# Patient Record
Sex: FEMALE | Race: WHITE | NOT HISPANIC OR LATINO | Employment: OTHER | URBAN - METROPOLITAN AREA
[De-identification: names, ages, dates, MRNs, and addresses within clinical notes are randomized per-mention and may not be internally consistent; named-entity substitution may affect disease eponyms.]

---

## 2017-11-13 ENCOUNTER — ALLSCRIPTS OFFICE VISIT (OUTPATIENT)
Dept: OTHER | Facility: OTHER | Age: 77
End: 2017-11-13

## 2017-11-13 DIAGNOSIS — Z12.31 ENCOUNTER FOR SCREENING MAMMOGRAM FOR MALIGNANT NEOPLASM OF BREAST: ICD-10-CM

## 2017-11-13 DIAGNOSIS — E78.5 HYPERLIPIDEMIA: ICD-10-CM

## 2017-11-13 DIAGNOSIS — M85.80 OTHER SPECIFIED DISORDERS OF BONE DENSITY AND STRUCTURE, UNSPECIFIED SITE (CODE): ICD-10-CM

## 2017-11-13 DIAGNOSIS — I10 ESSENTIAL (PRIMARY) HYPERTENSION: ICD-10-CM

## 2017-11-14 NOTE — PROGRESS NOTES
Assessment    1  Benign essential hypertension (401 1) (I10)   2  Palpitations (785 1) (R00 2)   3  Dyspareunia, female (625 0) (N94 10)   4  Vaginal atrophy (627 3) (N95 2)   5  Hyperlipidemia (272 4) (E78 5)   6  Osteopenia (733 90) (M85 80)   7  Generalized anxiety disorder (300 02) (F41 1)   8  Positive depression screening (796 4) (Z13 89)   9  Allergic rhinitis (477 9) (J30 9)   10  Generalized osteoarthritis (715 00) (M15 9)   11  Visit for screening mammogram (V76 12) (Z12 31)   12  Post-menopausal (V49 81) (Z78 0)   13  History of Vaginal prolapse (618 00) (N81 10)   14  History of Posterior Colporrhaphy (For Pelvic Relaxation)   15  History of Oophorectomy - Bilateral (Removal Of Both Ovaries)   16  History of Complete Colonoscopy   17  Family history of depression (V17 0) (Z81 8) : Mother   25  Family history of prostate carcinoma (V16 42) (O33 59) : Father   23     20  Three children   21  Lives with wife   25  Skin lesion (709 9) (L98 9)    Plan  Benign essential hypertension    · (1) CBC/PLT/DIFF; Status:Active; Requested for:13Nov2017;    · (1) COMPREHENSIVE METABOLIC PANEL; Status:Active; Requested for:13Nov2017;    · (1) MICROALBUMIN CREATININE RATIO, RANDOM URINE; Status:Active; Requestedfor:13Nov2017;    · Follow-up visit in 4 Months Evaluation and Treatment  Follow-up  Status: Hold For -Scheduling  Requested for: 61MDN9864   · A diet low in sodium and high in potassium, magnesium, and calcium can help yourblood pressure ; Status:Complete;   Done: 76DRP9417   · Begin a limited exercise program ; Status:Complete;   Done: 09MCB4885   · Prevnar 13 Intramuscular Suspension; INJECT 0 5ML INTRAMUSCULARLY; To Be Done: 21LYE1655  Hyperlipidemia    · (1) LIPID PANEL FASTING W DIRECT LDL REFLEX; Status:Active; Requestedfor:13Nov2017;    · (1) TSH; Status:Active;  Requested for:13Nov2017;    · Eat a low fat and low cholesterol diet ; Status:Complete;   Done: 72SKG0240  Osteopenia    · (1) VITAMIN D 25-HYDROXY; Status:Active; Requested for:13Nov2017;    · * DXA BONE DENSITY SPINE HIP AND PELVIS; Status:Hold For - Scheduling; Requestedfor:13Nov2017;   Palpitations    · EKG/ECG- POC; Status:Resulted - Requires Verification;   Done: 95ZWT0262 09:47AM  Positive depression screening    · *VB - Depression Follow Up With Primary Care Provider Evaluation and Treatment Follow-up  Status: Hold For - Scheduling  Requested for: 53JIQ5717  Skin lesion    · *1 - [facility]  *  Status: Hold For - Scheduling Requested for: 85YKV1022  Care Summary provided  : Yes  Visit for screening mammogram    · MAMMO SCREENING BILATERAL W 3D & CAD; Status:Hold For - Scheduling; Requestedfor:13Nov2017;     Discussion/Summary  Discussion Summary:   Doing well  Will check labs, order mammo and dexa  Colon cancer screen is up to date  Refusing the flu vaccine, but will get the prevnar  Can make an appt with the PA for pap  Suspect AK of the face and will refer to derm due to location and size  Discussed depression and she feels it is ok and doesn't want meds  Continue off prescription meds and on supplements  RTC four months  EKG w/o change from the past    Medication SE Review and Pt Understands Tx: Possible side effects of new medications were reviewed with the patient/guardian today  The treatment plan was reviewed with the patient/guardian  The patient/guardian understands and agrees with the treatment plan      Chief Complaint  Chief Complaint Free Text Note Form: Pt here to be established with our practice  Pt c/o allergies and sinus problems  Pt has mild depression  Pt defers Flu vaccine  Fall risk done today  History of Present Illness  HPI: DANIELLA, former pt of [email or web address], presents to establish  Doing well and remains active  No c/o's  Just getting over a URI  Depression screen is mildly positive  Due for labs, mammo, dexa, and vaccines  Not on any prescription meds, but on a lot of supplements   Still with [facility]  Preventive Quality Program 65 and Older: Falls Risk: The patient fell 0 times in the past 12 months  The patient is currently asymptomatic Symptoms Include: The patient currently has no urinary incontinence symptoms  Date of last glaucoma screen was 2017      Review of Systems  Complete-Female:  Constitutional: no fever,-- not feeling poorly,-- no chills-- and-- not feeling tired  Eyes: No complaints of eye pain, no red eyes, no eyesight problems, no discharge, no dry eyes, no itching of eyes  ENT: no complaints of earache, no loss of hearing, no nose bleeds, no nasal discharge, no sore throat, no hoarseness  Cardiovascular: palpitations, but-- no chest pain,-- no intermittent leg claudication-- and-- no lower extremity edema  Respiratory: no shortness of breath,-- no cough,-- no orthopnea,-- no wheezing,-- no shortness of breath during exertion-- and-- no PND  Gastrointestinal: no abdominal pain,-- no nausea,-- no constipation-- and-- no diarrhea  Genitourinary: no dysuria  Musculoskeletal: No complaints of arthralgias, no myalgias, no joint swelling or stiffness, no limb pain or swelling  Integumentary: No complaints of skin rash or lesions, no itching, no skin wounds, no breast pain or lump  Neurological: no headache,-- no confusion-- and-- no convulsions  Psychiatric: anxiety, but-- no depression  Endocrine: No complaints of proptosis, no hot flashes, no muscle weakness, no deepening of the voice, no feelings of weakness  Hematologic/Lymphatic: No complaints of swollen glands, no swollen glands in the neck, does not bleed easily, does not bruise easily  Active Problems    1  Allergic rhinitis (477 9) (J30 9)   2  Benign essential hypertension (401 1) (I10)   3  Dyspareunia, female (625 0) (N94 10)   4  Generalized anxiety disorder (300 02) (F41 1)   5  Generalized osteoarthritis (715 00) (M15 9)   6  Hyperlipidemia (272 4) (E78 5)   7  Osteopenia (733 90) (M85 80)   8  Palpitations (785 1) (R00 2)   9  Positive depression screening (796 4) (Z13 89)   10  Post-menopausal (V49 81) (Z78 0)   11  Vaginal atrophy (627 3) (N95 2)   12  Visit for screening mammogram (V76 12) (Z12 31)    Past Medical History  1  History of Vaginal prolapse (618 00) (N81 10)  Active Problems And Past Medical History Reviewed: The active problems and past medical history were reviewed and updated today  Surgical History  1  History of Hysterectomy   2  History of Removal/Revision Of Sling For Stress Incontinence   3  History of Repair Of Bladder Exstrophy  Surgical History Reviewed: The surgical history was reviewed and updated today  Family History  Mother    1  Family history of arthritis (V17 7) (Z82 61)   2  Family history of cardiac disorder (V17 49) (Z82 49)   3  Family history of cerebrovascular accident (CVA) (V17 1) (Z82 3)  Father    4  Family history of diabetes mellitus (V18 0) (Z83 3)  Brother    5  Family history of cardiac disorder (V17 49) (Z82 49)  Family History    6  Family history of diabetes mellitus (V18 0) (Z83 3)  Family History Reviewed: The family history was reviewed and updated today  Social History   · Never a smoker   · No alcohol use  Social History Reviewed: The social history was reviewed and updated today  The social history was reviewed and is unchanged  Current Meds   1  No Reported Medications Recorded    Allergies    1  Aspirin EC TBEC   2  Novocain SOLN    Vitals  Signs   Recorded: 95CHF7444 09:25AM   Temperature: 98 4 F, Tympanic  Heart Rate: 66  Respiration: 16  Systolic: 575, LUE, Sitting  Diastolic: 70, LUE, Sitting  Height: 5 ft 2 in  Weight: 118 lb   BMI Calculated: 21 58  BSA Calculated: 1 53  O2 Saturation: 98    Physical Exam   Constitutional  General appearance: No acute distress, well appearing and well nourished  Eyes  Conjunctiva and lids: No swelling, erythema or discharge     Pupils and irises: Equal, round and reactive to light    Ears, Nose, Mouth, and Throat  Oropharynx: Normal with no erythema, edema, exudate or lesions  Pulmonary  Respiratory effort: No increased work of breathing or signs of respiratory distress  Auscultation of lungs: Clear to auscultation  Cardiovascular  Auscultation of heart: Normal rate and rhythm, normal S1 and S2, without murmurs  Examination of extremities for edema and/or varicosities: Normal    Carotid pulses: Normal    Abdomen  Abdomen: Non-tender, no masses  Liver and spleen: No hepatomegaly or splenomegaly  Lymphatic  Palpation of lymph nodes in neck: No lymphadenopathy  Musculoskeletal  Gait and station: Normal    Digits and nails: Normal without clubbing or cyanosis  Inspection/palpation of joints, bones, and muscles: Normal    Skin  Examination of the skin for lesions: Abnormal  -- Right cheek with a larger macular hyperpigmented lesion  Neurologic  Cranial nerves: Cranial nerves 2-12 intact  Reflexes: 2+ and symmetric  Psychiatric  Orientation to person, place, and time: Normal    Mood and affect: Normal          Results/Data  EKG/ECG- POC 71HUD8494 09:47AM Dionicio Moy     Test Name Result Flag Reference   EKG/ECG      See printout for my interpetation  PHQ-9 Adult Depression Screening 66RYU7084 09:19AM UserAlma Delia     Test Name Result Flag Reference   PHQ-9 Adult Depression Score 7       Over the last two weeks, how often have you been bothered by any of the following problems?  Little interest or pleasure in doing things: Several days - 1 Feeling down, depressed, or hopeless: Several days - 1 Trouble falling or staying asleep, or sleeping too much: Several days - 1 Feeling tired or having little energy: Several days - 1 Poor appetite or over eating: Several days - 1 Feeling bad about yourself - or that you are a failure or have let yourself or your family down: Several days - 1 Trouble concentrating on things, such as reading the newspaper or watching television: Several days - 1 Moving or speaking so slowly that other people could have noticed   Or the opposite -  being so fidgety or restless that you have been moving around a lot more than usual: Not at all - 0 Thoughts that you would be better off dead, or of hurting yourself in some way: Not at all - 0   PHQ-9 Adult Depression Screening Negative     PHQ-9 Difficulty Level Not difficult at all     PHQ-9 Severity Mild Depression           Signatures   Electronically signed by : NATASHA Hunter ; Nov 13 2017 10:42AM EST                       (Author)

## 2017-11-15 ENCOUNTER — APPOINTMENT (OUTPATIENT)
Dept: LAB | Facility: CLINIC | Age: 77
End: 2017-11-15
Payer: MEDICARE

## 2017-11-15 ENCOUNTER — TRANSCRIBE ORDERS (OUTPATIENT)
Dept: LAB | Facility: CLINIC | Age: 77
End: 2017-11-15

## 2017-11-15 DIAGNOSIS — E78.5 HYPERLIPIDEMIA: ICD-10-CM

## 2017-11-15 DIAGNOSIS — I10 ESSENTIAL (PRIMARY) HYPERTENSION: ICD-10-CM

## 2017-11-15 DIAGNOSIS — M85.80 OTHER SPECIFIED DISORDERS OF BONE DENSITY AND STRUCTURE, UNSPECIFIED SITE (CODE): ICD-10-CM

## 2017-11-15 LAB
25(OH)D3 SERPL-MCNC: 47.4 NG/ML (ref 30–100)
ALBUMIN SERPL BCP-MCNC: 3.8 G/DL (ref 3.5–5)
ALP SERPL-CCNC: 73 U/L (ref 46–116)
ALT SERPL W P-5'-P-CCNC: 30 U/L (ref 12–78)
ANION GAP SERPL CALCULATED.3IONS-SCNC: 5 MMOL/L (ref 4–13)
AST SERPL W P-5'-P-CCNC: 20 U/L (ref 5–45)
BASOPHILS # BLD AUTO: 0.03 THOUSANDS/ΜL (ref 0–0.1)
BASOPHILS NFR BLD AUTO: 1 % (ref 0–1)
BILIRUB SERPL-MCNC: 0.55 MG/DL (ref 0.2–1)
BUN SERPL-MCNC: 10 MG/DL (ref 5–25)
CALCIUM SERPL-MCNC: 9 MG/DL (ref 8.3–10.1)
CHLORIDE SERPL-SCNC: 99 MMOL/L (ref 100–108)
CHOLEST SERPL-MCNC: 188 MG/DL (ref 50–200)
CO2 SERPL-SCNC: 32 MMOL/L (ref 21–32)
CREAT SERPL-MCNC: 0.7 MG/DL (ref 0.6–1.3)
CREAT UR-MCNC: 53.2 MG/DL
EOSINOPHIL # BLD AUTO: 0.17 THOUSAND/ΜL (ref 0–0.61)
EOSINOPHIL NFR BLD AUTO: 3 % (ref 0–6)
ERYTHROCYTE [DISTWIDTH] IN BLOOD BY AUTOMATED COUNT: 12.5 % (ref 11.6–15.1)
GFR SERPL CREATININE-BSD FRML MDRD: 85 ML/MIN/1.73SQ M
GLUCOSE P FAST SERPL-MCNC: 90 MG/DL (ref 65–99)
HCT VFR BLD AUTO: 41 % (ref 34.8–46.1)
HDLC SERPL-MCNC: 61 MG/DL (ref 40–60)
HGB BLD-MCNC: 14.4 G/DL (ref 11.5–15.4)
LDLC SERPL CALC-MCNC: 110 MG/DL (ref 0–100)
LYMPHOCYTES # BLD AUTO: 1.14 THOUSANDS/ΜL (ref 0.6–4.47)
LYMPHOCYTES NFR BLD AUTO: 18 % (ref 14–44)
MCH RBC QN AUTO: 30.4 PG (ref 26.8–34.3)
MCHC RBC AUTO-ENTMCNC: 35.1 G/DL (ref 31.4–37.4)
MCV RBC AUTO: 87 FL (ref 82–98)
MICROALBUMIN UR-MCNC: 5.5 MG/L (ref 0–20)
MICROALBUMIN/CREAT 24H UR: 10 MG/G CREATININE (ref 0–30)
MONOCYTES # BLD AUTO: 0.51 THOUSAND/ΜL (ref 0.17–1.22)
MONOCYTES NFR BLD AUTO: 8 % (ref 4–12)
NEUTROPHILS # BLD AUTO: 4.65 THOUSANDS/ΜL (ref 1.85–7.62)
NEUTS SEG NFR BLD AUTO: 70 % (ref 43–75)
NRBC BLD AUTO-RTO: 0 /100 WBCS
PLATELET # BLD AUTO: 246 THOUSANDS/UL (ref 149–390)
PMV BLD AUTO: 9.5 FL (ref 8.9–12.7)
POTASSIUM SERPL-SCNC: 3.9 MMOL/L (ref 3.5–5.3)
PROT SERPL-MCNC: 7.5 G/DL (ref 6.4–8.2)
RBC # BLD AUTO: 4.74 MILLION/UL (ref 3.81–5.12)
SODIUM SERPL-SCNC: 136 MMOL/L (ref 136–145)
TRIGL SERPL-MCNC: 84 MG/DL
TSH SERPL DL<=0.05 MIU/L-ACNC: 0.96 UIU/ML (ref 0.36–3.74)
WBC # BLD AUTO: 6.51 THOUSAND/UL (ref 4.31–10.16)

## 2017-11-15 PROCEDURE — 85025 COMPLETE CBC W/AUTO DIFF WBC: CPT

## 2017-11-15 PROCEDURE — 36415 COLL VENOUS BLD VENIPUNCTURE: CPT

## 2017-11-15 PROCEDURE — 82306 VITAMIN D 25 HYDROXY: CPT

## 2017-11-15 PROCEDURE — 82570 ASSAY OF URINE CREATININE: CPT

## 2017-11-15 PROCEDURE — 82043 UR ALBUMIN QUANTITATIVE: CPT

## 2017-11-15 PROCEDURE — 80061 LIPID PANEL: CPT

## 2017-11-15 PROCEDURE — 84443 ASSAY THYROID STIM HORMONE: CPT

## 2017-11-15 PROCEDURE — 80053 COMPREHEN METABOLIC PANEL: CPT

## 2017-11-16 ENCOUNTER — GENERIC CONVERSION - ENCOUNTER (OUTPATIENT)
Dept: OTHER | Facility: OTHER | Age: 77
End: 2017-11-16

## 2017-12-04 ENCOUNTER — GENERIC CONVERSION - ENCOUNTER (OUTPATIENT)
Dept: FAMILY MEDICINE CLINIC | Facility: CLINIC | Age: 77
End: 2017-12-04

## 2018-01-13 VITALS
HEIGHT: 62 IN | OXYGEN SATURATION: 98 % | RESPIRATION RATE: 16 BRPM | BODY MASS INDEX: 21.71 KG/M2 | WEIGHT: 118 LBS | TEMPERATURE: 98.4 F | DIASTOLIC BLOOD PRESSURE: 70 MMHG | SYSTOLIC BLOOD PRESSURE: 138 MMHG | HEART RATE: 66 BPM

## 2018-01-16 NOTE — RESULT NOTES
Verified Results  (1) VITAMIN D 25-HYDROXY [id number] 08:20AM Aline Burkitt Order Number: [id number]     Test Name Result Flag Reference   VIT D 25-HYDROX 47 4 ng/mL  30 0-100 0   This assay is a certified procedure of the CDC Vitamin D Standardization Certification Program (VDSCP)     Deficiency <20ng/ml   Insufficiency 20-30ng/ml   Sufficient  ng/ml     *Patients undergoing fluorescein dye angiography may retain small amounts of fluorescein in the body for 48-72 hours post procedure  Samples containing fluorescein can produce falsely elevated Vitamin D values  If the patient had this procedure, a specimen should be resubmitted post fluorescein clearance  (1) LIPID PANEL FASTING W DIRECT LDL REFLEX [id number] 08:20AM Aline Burkitt Order Number: [id number]     Test Name Result Flag Reference   CHOLESTEROL 188 mg/dL     LDL CHOLESTEROL CALCULATED 110 mg/dL H 0-100   Triglyceride:        Normal <150 mg/dl   Borderline High 150-199 mg/dl   High 200-499 mg/dl   Very High >499 mg/dl      Cholesterol:       Desirable <200 mg/dl    Borderline High 200-239 mg/dl    High >239 mg/dl      HDL Cholesterol:       High>59 mg/dL    Low <41 mg/dL      HDL Cholesterol:       High>59 mg/dL    Low <41 mg/dL      This screening LDL is a calculated result  It does not have the accuracy of the Direct Measured LDL in the monitoring of patients with hyperlipidemia and/or statin therapy  Direct Measure LDL (TRF396) must be ordered separately in these patients  TRIGLYCERIDES 84 mg/dL  <=150   Specimen collection should occur prior to N-Acetylcysteine or Metamizole administration due to the potential for falsely depressed results  HDL,DIRECT 61 mg/dL H 40-60   Specimen collection should occur prior to Metamizole administration due to the potential for falsley depressed results       (1) TSH [id number] 08:20AM Aline Burkitt Order Number: [id number]     Test Name Result Flag Reference   TSH 0 964 uIU/mL  0 358-3 740   Patients undergoing fluorescein dye angiography may retain small amounts of fluorescein in the body for 48-72 hours post procedure  Samples containing fluorescein can produce falsely depressed TSH values  If the patient had this procedure,a specimen should be resubmitted post fluorescein clearance  The recommended reference ranges for TSH during pregnancy are as follows:  First trimester 0 1 to 2 5 uIU/mL  Second trimester  0 2 to 3 0 uIU/mL  Third trimester 0 3 to 3 0 uIU/m     (1) CBC/PLT/DIFF [id number] 08:20AM Annamarie Hannah Order Number: [id number]     Test Name Result Flag Reference   WBC COUNT 6 51 Thousand/uL  4 31-10 16   RBC COUNT 4 74 Million/uL  3 81-5 12   HEMOGLOBIN 14 4 g/dL  11 5-15 4   HEMATOCRIT 41 0 %  34 8-46  1   MCV 87 fL  82-98   MCH 30 4 pg  26 8-34 3   MCHC 35 1 g/dL  31 4-37 4   RDW 12 5 %  11 6-15 1   MPV 9 5 fL  8 9-12 7   PLATELET COUNT 378 Thousands/uL  149-390   nRBC AUTOMATED 0 /100 WBCs     NEUTROPHILS RELATIVE PERCENT 70 %  43-75   LYMPHOCYTES RELATIVE PERCENT 18 %  14-44   MONOCYTES RELATIVE PERCENT 8 %  4-12   EOSINOPHILS RELATIVE PERCENT 3 %  0-6   BASOPHILS RELATIVE PERCENT 1 %  0-1   NEUTROPHILS ABSOLUTE COUNT 4 65 Thousands/? ??L  1 85-7 62   LYMPHOCYTES ABSOLUTE COUNT 1 14 Thousands/? ??L  0 60-4 47   MONOCYTES ABSOLUTE COUNT 0 51 Thousand/? ??L  0 17-1 22   EOSINOPHILS ABSOLUTE COUNT 0 17 Thousand/? ??L  0 00-0 61   BASOPHILS ABSOLUTE COUNT 0 03 Thousands/? ??L  0 00-0 10     (1) COMPREHENSIVE METABOLIC PANEL [id number] 26:36WC Annamarie Hannah Order Number: [id number]     Test Name Result Flag Reference   SODIUM 136 mmol/L  136-145   POTASSIUM 3 9 mmol/L  3 5-5 3   CHLORIDE 99 mmol/L L 100-108   CARBON DIOXIDE 32 mmol/L  21-32   ANION GAP (CALC) 5 mmol/L  4-13   BLOOD UREA NITROGEN 10 mg/dL  5-25   CREATININE 0 70 mg/dL  0 60-1 30   Standardized to IDMS reference method   CALCIUM 9 0 mg/dL  8 3-10 1 BILI, TOTAL 0 55 mg/dL  0 20-1 00   ALK PHOSPHATAS 73 U/L     ALT (SGPT) 30 U/L  12-78   Specimen collection should occur prior to Sulfasalazine and/or Sulfapyridine administration due to the potential for falsely depressed results  AST(SGOT) 20 U/L  5-45   Specimen collection should occur prior to Sulfasalazine administration due to the potential for falsely depressed results  ALBUMIN 3 8 g/dL  3 5-5 0   TOTAL PROTEIN 7 5 g/dL  6 4-8 2   eGFR 85 ml/min/1 73sq m     National Kidney Disease Education Program recommendations are as follows:  GFR calculation is accurate only with a steady state creatinine  Chronic Kidney disease less than 60 ml/min/1 73 sq  meters  Kidney failure less than 15 ml/min/1 73 sq  meters  GLUCOSE FASTING 90 mg/dL  65-99   Specimen collection should occur prior to Sulfasalazine administration due to the potential for falsely depressed results  Specimen collection should occur prior to Sulfapyridine administration due to the potential for falsely elevated results       (1) MICROALBUMIN CREATININE RATIO, RANDOM URINE [id number] 08:20AM Jennifer Kinsey Order Number: [id number]     Test Name Result Flag Reference   MICROALBUMIN/ CREAT R 10 mg/g creatinine  0-30   MICROALBUMIN,URINE 5 5 mg/L  0 0-20 0   CREATININE URINE 53 2 mg/dL

## 2018-03-11 PROBLEM — M15.9 GENERALIZED OSTEOARTHRITIS: Status: ACTIVE | Noted: 2017-11-13

## 2018-03-11 PROBLEM — N95.2 VAGINAL ATROPHY: Status: ACTIVE | Noted: 2017-11-13

## 2018-03-11 PROBLEM — J30.9 ALLERGIC RHINITIS: Status: ACTIVE | Noted: 2017-11-13

## 2018-03-11 PROBLEM — E78.5 HYPERLIPIDEMIA: Status: ACTIVE | Noted: 2017-11-13

## 2018-03-11 PROBLEM — R00.2 PALPITATIONS: Status: ACTIVE | Noted: 2017-11-13

## 2018-03-11 PROBLEM — M85.80 OSTEOPENIA: Status: ACTIVE | Noted: 2017-11-13

## 2018-03-11 PROBLEM — F41.1 GENERALIZED ANXIETY DISORDER: Status: ACTIVE | Noted: 2017-11-13

## 2018-03-11 PROBLEM — I10 BENIGN ESSENTIAL HYPERTENSION: Status: ACTIVE | Noted: 2017-11-13

## 2018-03-11 PROBLEM — N94.10 DYSPAREUNIA, FEMALE: Status: ACTIVE | Noted: 2017-11-13

## 2018-03-12 ENCOUNTER — OFFICE VISIT (OUTPATIENT)
Dept: INTERNAL MEDICINE CLINIC | Facility: CLINIC | Age: 78
End: 2018-03-12
Payer: MEDICARE

## 2018-03-12 VITALS
BODY MASS INDEX: 21.9 KG/M2 | HEIGHT: 62 IN | DIASTOLIC BLOOD PRESSURE: 80 MMHG | WEIGHT: 119 LBS | SYSTOLIC BLOOD PRESSURE: 140 MMHG | HEART RATE: 68 BPM | OXYGEN SATURATION: 98 % | TEMPERATURE: 97.8 F | RESPIRATION RATE: 18 BRPM

## 2018-03-12 DIAGNOSIS — Z01.818 PREOP EXAM FOR INTERNAL MEDICINE: Primary | ICD-10-CM

## 2018-03-12 DIAGNOSIS — H25.9 AGE-RELATED CATARACT OF BOTH EYES, UNSPECIFIED AGE-RELATED CATARACT TYPE: ICD-10-CM

## 2018-03-12 PROCEDURE — 99214 OFFICE O/P EST MOD 30 MIN: CPT | Performed by: INTERNAL MEDICINE

## 2018-03-12 RX ORDER — PROPRANOLOL/HYDROCHLOROTHIAZID 40 MG-25MG
1 TABLET ORAL DAILY
COMMUNITY
Start: 2017-11-13

## 2018-03-12 RX ORDER — CRANBERRY FRUIT EXTRACT 425 MG
CAPSULE ORAL
COMMUNITY
Start: 2017-11-13

## 2018-03-12 RX ORDER — UBIDECARENONE 75 MG
CAPSULE ORAL
COMMUNITY
Start: 2017-11-13

## 2018-03-12 RX ORDER — DOCUSATE SODIUM 100 MG/1
1 CAPSULE, LIQUID FILLED ORAL
COMMUNITY
Start: 2017-11-13

## 2018-03-12 RX ORDER — CHLORAL HYDRATE 500 MG
2 CAPSULE ORAL 2 TIMES DAILY
COMMUNITY
Start: 2017-11-13

## 2018-03-12 RX ORDER — PERPHENAZINE/AMITRIPTYLINE HCL 4 MG-25 MG
TABLET ORAL
COMMUNITY
Start: 2017-11-13

## 2018-03-12 NOTE — PROGRESS NOTES
Subjective:     Virginia Cavanaugh is a 78 y o  female who presents to the office today for a preoperative consultation at the request of surgeon Dr Maurice Mendez who plans on performing bilat cataracts on March 21 and April 11  This consultation is requested for the specific conditions prompting preoperative evaluation (i e  because of potential affect on operative risk): Htn  Planned anesthesia: local  The patient has the following known anesthesia issues: mutiple sx in the past and only issue with anesthesia has been some n/v    Patients bleeding risk: no recent abnormal bleeding  Patient does not have objections to receiving blood products if needed  The following portions of the patient's history were reviewed and updated as appropriate:   She  has a past medical history of Arthritis; Hypertension; and Vaginal prolapse  She   Patient Active Problem List    Diagnosis Date Noted    Allergic rhinitis 11/13/2017    Benign essential hypertension 11/13/2017    Dyspareunia, female 11/13/2017    Generalized anxiety disorder 11/13/2017    Generalized osteoarthritis 11/13/2017    Hyperlipidemia 11/13/2017    Osteopenia 11/13/2017    Palpitations 11/13/2017    Vaginal atrophy 11/13/2017     She  has a past surgical history that includes Colonoscopy; Hysterectomy; Bilateral oophorectomy; Bony pelvis surgery; Incontinence surgery; and Bladder exstrophy closure  Her family history includes Arthritis in her mother; Depression in her mother; Diabetes in her family and father; Heart disease in her brother and mother; Prostate cancer in her father; Stroke in her mother; Substance Abuse in her mother  She  reports that she has never smoked  She has never used smokeless tobacco  She reports that she does not drink alcohol or use drugs    Current Outpatient Prescriptions   Medication Sig Dispense Refill    Aloe Vera 470 MG CAPS Take 1 capsule by mouth daily      B Complex-C-E-Zn (ZINC-VITES PO) Take 1 tablet by mouth daily      B-COMPLEX-C PO Take 1 capsule by mouth daily      Bioflavonoid Products (C COMPLEX PO) Take 1 tablet by mouth daily      Calcium Carbonate-Vitamin D (CALCIUM-CARB 600 + D) 600-125 MG-UNIT TABS Take 1 tablet by mouth 2 (two) times a day      Coenzyme Q10 (CO Q-10) 200 MG CAPS Take by mouth      Cranberry 425 MG CAPS Take by mouth      docusate sodium (STOOL SOFTENER) 100 mg capsule Take 1 capsule by mouth      Evening Primrose Oil 1000 MG CAPS Take by mouth      Garlic 0549 MG CAPS Take by mouth      Lutein 40 MG CAPS Take by mouth      magnesium oxide (MAG-OX) 400 mg Take 1 tablet by mouth daily      Multiple Vitamin (MULTI-VITAMIN DAILY PO) Take by mouth daily      Omega-3 1000 MG CAPS Take 2 capsules by mouth 2 (two) times a day      Psyllium 400 MG CAPS Take 1 capsule by mouth daily      Turmeric (CURCUMIN 95) 500 MG CAPS Take 1 capsule by mouth daily       No current facility-administered medications for this visit  No current outpatient prescriptions on file prior to visit  No current facility-administered medications on file prior to visit  She is allergic to aspirin and procaine       Review of Systems  ROS was negative and pt denies any recent illness  Denies CP , SOB, but continues with intermittent palptations that are unchanged  No Orthopnea or PND  No Sz or syncope  No changes in bowel or bladder habits  Recent back issues that have resoloved        Objective:     /80 (BP Location: Left arm, Patient Position: Sitting, Cuff Size: Adult)   Pulse 68   Temp 97 8 °F (36 6 °C) (Tympanic)   Resp 18   Ht 5' 2" (1 575 m)   Wt 54 kg (119 lb)   SpO2 98%   BMI 21 77 kg/m²     General Appearance:    Alert, cooperative, no distress, appears stated age   Head:    Normocephalic, without obvious abnormality, atraumatic   Eyes:    PERRL, conjunctiva/corneas clear, EOM's intact, fundi     benign, both eyes   Ears:    Normal TM's and external ear canals, both ears   Nose: Nares normal, septum midline, mucosa normal, no drainage    or sinus tenderness   Throat:   Lips, mucosa, and tongue normal; teeth and gums normal   Neck:   Supple, symmetrical, trachea midline, no adenopathy;     thyroid:  no enlargement/tenderness/nodules; no carotid    bruit or JVD   Back:     Symmetric, no curvature, ROM normal, no CVA tenderness   Lungs:     Clear to auscultation bilaterally, respirations unlabored   Chest Wall:    No tenderness or deformity    Heart:    Regular rate and rhythm, S1 and S2 normal, no murmur, rub   or gallop   Breast Exam:    No tenderness, masses, or nipple abnormality   Abdomen:     Soft, non-tender, bowel sounds active all four quadrants,     no masses, no organomegaly   Genitalia:    Normal female without lesion, discharge or tenderness   Rectal:    Normal tone, no masses or tenderness; guaiac negative stool   Extremities:   Extremities normal, atraumatic, no cyanosis or edema   Pulses:   2+ and symmetric all extremities   Skin:   Skin color, texture, turgor normal, no rashes or lesions   Lymph nodes:   Cervical, supraclavicular, and axillary nodes normal   Neurologic:   CNII-XII intact, normal strength, sensation and reflexes     throughout       Predictors of intubation difficulty:   Morbid obesity? no   Anatomically abnormal facies? no   Prominent incisors? no   Receding mandible? no   Short, thick neck? no   Neck range of motion: normal          Dentition: caps but no dentures  Cardiographics  ECG: EKG from 11/17 w/o acute changes  Echocardiogram: not done    Imaging  Chest x-ray: not done     Lab Review   not applicable     Assessment:     78 y o  female with planned surgery as above  Known risk factors for perioperative complications: None  None    Difficulty with intubation is not anticipated  Cardiac Risk Estimation: Kim's Class 1 with cardiac risk of 1%       Current medications which may produce withdrawal symptoms if withheld perioperatively: None Plan:     1  Preoperative workup as follows none  2  Change in medication regimen before surgery: none, continue medication regimen including morning of surgery, with sip of water and continue current meds, but would hold and ASA, NSAID's, and omega 3 one week prior  Can resume her current meds once eating     3  Prophylaxis for cardiac events with perioperative beta-blockers: not indicated  4  Invasive hemodynamic monitoring perioperatively: not indicated  5  Deep vein thrombosis prophylaxis postoperatively:none  6  Surveillance for postoperative MI with ECG immediately postoperatively and on postoperative days 1 and 2 AND troponin levels 24 hours postoperatively and on day 4 or hospital discharge (whichever comes first): Not required     7  Other measures: No other recommendations at this time

## 2018-04-25 ENCOUNTER — OFFICE VISIT (OUTPATIENT)
Dept: INTERNAL MEDICINE CLINIC | Facility: CLINIC | Age: 78
End: 2018-04-25
Payer: MEDICARE

## 2018-04-25 VITALS
DIASTOLIC BLOOD PRESSURE: 70 MMHG | TEMPERATURE: 99.4 F | HEIGHT: 62 IN | SYSTOLIC BLOOD PRESSURE: 140 MMHG | RESPIRATION RATE: 14 BRPM | HEART RATE: 78 BPM | WEIGHT: 120 LBS | BODY MASS INDEX: 22.08 KG/M2

## 2018-04-25 DIAGNOSIS — F41.1 GENERALIZED ANXIETY DISORDER: ICD-10-CM

## 2018-04-25 DIAGNOSIS — I10 BENIGN ESSENTIAL HYPERTENSION: Primary | ICD-10-CM

## 2018-04-25 DIAGNOSIS — R00.2 PALPITATIONS: ICD-10-CM

## 2018-04-25 DIAGNOSIS — E78.2 MIXED HYPERLIPIDEMIA: ICD-10-CM

## 2018-04-25 DIAGNOSIS — M15.9 GENERALIZED OSTEOARTHRITIS: ICD-10-CM

## 2018-04-25 DIAGNOSIS — M85.80 OSTEOPENIA, UNSPECIFIED LOCATION: ICD-10-CM

## 2018-04-25 PROCEDURE — 99214 OFFICE O/P EST MOD 30 MIN: CPT | Performed by: INTERNAL MEDICINE

## 2018-04-25 RX ORDER — KETOROLAC TROMETHAMINE 5 MG/ML
SOLUTION OPHTHALMIC
COMMUNITY
Start: 2018-03-29 | End: 2018-08-27

## 2018-04-25 RX ORDER — TRIPROLIDINE/PSEUDOEPHEDRINE 2.5MG-60MG
TABLET ORAL
COMMUNITY
Start: 2018-03-30 | End: 2018-08-27

## 2018-04-25 RX ORDER — OFLOXACIN 3 MG/ML
SOLUTION/ DROPS OPHTHALMIC
COMMUNITY
Start: 2018-03-29 | End: 2018-08-27

## 2018-04-25 NOTE — PATIENT INSTRUCTIONS

## 2018-04-25 NOTE — PROGRESS NOTES
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  Discussed treatment for the FARZAD and declines due to past meds not agreeing with her  Also, discussed Holter, but declines  If s/s persists, recommend re-eval and pt to call if she wants to go ahead with w/u  Labs due next visit  Keep f/u with optho  Monitor the URI  Pt was taking DM OTC products and advised against it due to palpitations and htn  Pt also takes a lot of supplements and told her to check them for the FARZAD and palpitations  Continue current treatment otherwise  Declines colon cancer screen  RTC four months for routine  Subjective:      Patient ID: Safia Monae is a 78 y o  female  WF RTC for f/u hyperlipidemia, htn, etc  Doing ok and s/p eye surgery two weeks ago  Activity level increasing and no falls  No c/o's and is getting over a URI  ROS shows pt notes increase stress and anxiety along with some increase palpitations  No associated CP, sweating, radiation, lightheadedness, but ?SOB  Last seconds and similar to past palpitations, but maybe more frequent since the sx  Pt reports being w/u about her sight  Not due for labs  The following portions of the patient's history were reviewed and updated as appropriate:   She  has a past medical history of Arthritis; Hypertension; and Vaginal prolapse    She   Patient Active Problem List    Diagnosis Date Noted    Allergic rhinitis 11/13/2017    Benign essential hypertension 11/13/2017    Dyspareunia, female 11/13/2017    Generalized anxiety disorder 11/13/2017    Generalized osteoarthritis 11/13/2017    Hyperlipidemia 11/13/2017    Osteopenia 11/13/2017    Palpitations 11/13/2017    Vaginal atrophy 11/13/2017     She  has a past surgical history that includes Colonoscopy; Hysterectomy; Bilateral oophorectomy; Bony pelvis surgery; Incontinence surgery; Bladder exstrophy closure; and Eye surgery  Her family history includes Arthritis in her mother; Depression in her mother; Diabetes in her family and father; Heart disease in her brother and mother; Prostate cancer in her father; Stroke in her mother; Substance Abuse in her mother  She  reports that she has never smoked  She has never used smokeless tobacco  She reports that she does not drink alcohol or use drugs  Current Outpatient Prescriptions   Medication Sig Dispense Refill    Bioflavonoid Products (C COMPLEX PO) Take 1 tablet by mouth daily      Calcium Carbonate-Vitamin D (CALCIUM-CARB 600 + D) 600-125 MG-UNIT TABS Take 1 tablet by mouth 2 (two) times a day      Coenzyme Q10 (CO Q-10) 200 MG CAPS Take by mouth      Cranberry 425 MG CAPS Take by mouth      docusate sodium (STOOL SOFTENER) 100 mg capsule Take 1 capsule by mouth      DUREZOL 0 05 % EMUL       Evening Primrose Oil 1000 MG CAPS Take by mouth      Garlic 5289 MG CAPS Take by mouth      ketorolac (ACULAR) 0 5 % ophthalmic solution       Lutein 40 MG CAPS Take by mouth      magnesium oxide (MAG-OX) 400 mg Take 1 tablet by mouth daily      Multiple Vitamin (MULTI-VITAMIN DAILY PO) Take by mouth daily      ofloxacin (OCUFLOX) 0 3 % ophthalmic solution       Omega-3 1000 MG CAPS Take 2 capsules by mouth 2 (two) times a day      Psyllium 400 MG CAPS Take 1 capsule by mouth daily      Turmeric (CURCUMIN 95) 500 MG CAPS Take 1 capsule by mouth daily       No current facility-administered medications for this visit        Current Outpatient Prescriptions on File Prior to Visit Medication Sig    Bioflavonoid Products (C COMPLEX PO) Take 1 tablet by mouth daily    Calcium Carbonate-Vitamin D (CALCIUM-CARB 600 + D) 600-125 MG-UNIT TABS Take 1 tablet by mouth 2 (two) times a day    Coenzyme Q10 (CO Q-10) 200 MG CAPS Take by mouth    Cranberry 425 MG CAPS Take by mouth    docusate sodium (STOOL SOFTENER) 100 mg capsule Take 1 capsule by mouth    Evening Primrose Oil 1000 MG CAPS Take by mouth    Garlic 2028 MG CAPS Take by mouth    Lutein 40 MG CAPS Take by mouth    magnesium oxide (MAG-OX) 400 mg Take 1 tablet by mouth daily    Multiple Vitamin (MULTI-VITAMIN DAILY PO) Take by mouth daily    Omega-3 1000 MG CAPS Take 2 capsules by mouth 2 (two) times a day    Psyllium 400 MG CAPS Take 1 capsule by mouth daily    Turmeric (CURCUMIN 95) 500 MG CAPS Take 1 capsule by mouth daily    [DISCONTINUED] Aloe Vera 470 MG CAPS Take 1 capsule by mouth daily    [DISCONTINUED] B Complex-C-E-Zn (ZINC-VITES PO) Take 1 tablet by mouth daily    [DISCONTINUED] B-COMPLEX-C PO Take 1 capsule by mouth daily     No current facility-administered medications on file prior to visit  She is allergic to aspirin and procaine       Review of Systems   Constitutional: Positive for activity change  Negative for chills, diaphoresis, fatigue and fever  HENT: Positive for congestion, postnasal drip and rhinorrhea  Eyes: Positive for visual disturbance  Respiratory: Negative for cough, chest tightness, shortness of breath and wheezing  Cardiovascular: Positive for palpitations  Negative for chest pain and leg swelling  Gastrointestinal: Negative for abdominal pain, constipation, diarrhea, nausea and vomiting  Endocrine: Negative for cold intolerance and heat intolerance  Genitourinary: Negative for difficulty urinating, dysuria and frequency  Musculoskeletal: Negative for arthralgias, gait problem and myalgias     Neurological: Negative for dizziness, tremors, seizures, syncope, facial asymmetry, weakness, light-headedness, numbness and headaches  Psychiatric/Behavioral: Negative for confusion, dysphoric mood and sleep disturbance  The patient is nervous/anxious  Objective:      /70   Pulse 78   Temp 99 4 °F (37 4 °C) (Tympanic)   Resp 14   Ht 5' 2" (1 575 m)   Wt 54 4 kg (120 lb)   BMI 21 95 kg/m²          Physical Exam   Constitutional: She is oriented to person, place, and time  She appears well-developed and well-nourished  No distress  HENT:   Head: Normocephalic and atraumatic  Mouth/Throat: Oropharynx is clear and moist  No oropharyngeal exudate  Eyes: Conjunctivae and EOM are normal  Pupils are equal, round, and reactive to light  Neck: Normal range of motion  Neck supple  No JVD present  No thyromegaly present  Cardiovascular: Normal rate, regular rhythm and normal heart sounds  No murmur heard  Pulmonary/Chest: Effort normal and breath sounds normal  No respiratory distress  She has no wheezes  Abdominal: Soft  Bowel sounds are normal  She exhibits no distension  There is no tenderness  Neurological: She is alert and oriented to person, place, and time  Psychiatric: She has a normal mood and affect  Her behavior is normal  Judgment and thought content normal    Very anxious   Nursing note and vitals reviewed

## 2018-08-27 ENCOUNTER — OFFICE VISIT (OUTPATIENT)
Dept: INTERNAL MEDICINE CLINIC | Facility: CLINIC | Age: 78
End: 2018-08-27
Payer: MEDICARE

## 2018-08-27 VITALS
OXYGEN SATURATION: 98 % | DIASTOLIC BLOOD PRESSURE: 74 MMHG | BODY MASS INDEX: 20.98 KG/M2 | HEART RATE: 72 BPM | WEIGHT: 114 LBS | SYSTOLIC BLOOD PRESSURE: 152 MMHG | TEMPERATURE: 97.6 F | RESPIRATION RATE: 18 BRPM | HEIGHT: 62 IN

## 2018-08-27 DIAGNOSIS — Z00.00 MEDICARE ANNUAL WELLNESS VISIT, INITIAL: ICD-10-CM

## 2018-08-27 DIAGNOSIS — Z13.31 POSITIVE DEPRESSION SCREENING: ICD-10-CM

## 2018-08-27 DIAGNOSIS — M15.9 GENERALIZED OSTEOARTHRITIS: ICD-10-CM

## 2018-08-27 DIAGNOSIS — I10 BENIGN ESSENTIAL HYPERTENSION: Primary | ICD-10-CM

## 2018-08-27 DIAGNOSIS — M85.80 OSTEOPENIA, UNSPECIFIED LOCATION: ICD-10-CM

## 2018-08-27 DIAGNOSIS — R00.2 PALPITATIONS: ICD-10-CM

## 2018-08-27 DIAGNOSIS — E78.2 MIXED HYPERLIPIDEMIA: ICD-10-CM

## 2018-08-27 DIAGNOSIS — F41.1 GENERALIZED ANXIETY DISORDER: ICD-10-CM

## 2018-08-27 PROCEDURE — G0438 PPPS, INITIAL VISIT: HCPCS | Performed by: INTERNAL MEDICINE

## 2018-08-27 PROCEDURE — 99214 OFFICE O/P EST MOD 30 MIN: CPT | Performed by: INTERNAL MEDICINE

## 2018-08-27 RX ORDER — METOPROLOL SUCCINATE 50 MG/1
25 TABLET, EXTENDED RELEASE ORAL DAILY
Qty: 90 TABLET | Refills: 0 | Status: SHIPPED | OUTPATIENT
Start: 2018-08-27 | End: 2019-02-03 | Stop reason: SDUPTHER

## 2018-08-27 RX ORDER — METOPROLOL SUCCINATE 50 MG/1
25 TABLET, EXTENDED RELEASE ORAL DAILY
Qty: 90 TABLET | Refills: 0 | Status: SHIPPED | OUTPATIENT
Start: 2018-08-27 | End: 2018-08-27 | Stop reason: SDUPTHER

## 2018-08-27 NOTE — PROGRESS NOTES
Assessment and Plan:    Problem List Items Addressed This Visit        Cardiovascular and Mediastinum    Benign essential hypertension - Primary    Relevant Medications    metoprolol succinate (TOPROL-XL) 50 mg 24 hr tablet    Other Relevant Orders    Comprehensive metabolic panel       Musculoskeletal and Integument    Generalized osteoarthritis    Osteopenia       Other    Generalized anxiety disorder    Relevant Medications    metoprolol succinate (TOPROL-XL) 50 mg 24 hr tablet    Hyperlipidemia    Relevant Orders    LDL cholesterol, direct    Triglycerides    Palpitations    Relevant Medications    metoprolol succinate (TOPROL-XL) 50 mg 24 hr tablet      Other Visit Diagnoses     Medicare annual wellness visit, initial        Positive depression screening        Relevant Medications    metoprolol succinate (TOPROL-XL) 50 mg 24 hr tablet        Health Maintenance Due   Topic Date Due    CRC Screening: Colonoscopy  1940    DTaP,Tdap,and Td Vaccines (1 - Tdap) 07/12/1963    Pneumococcal PPSV23/PCV13 65+ Years / Low and Medium Risk (2 of 2 - PPSV23) 11/13/2018         HPI:  Judith Vela is a 70 y o  female here for her Subsequent Wellness Visit  Please note this is the pt's initial Medicare wellness and I am unable to remove this heading       Patient Active Problem List   Diagnosis    Allergic rhinitis    Benign essential hypertension    Dyspareunia, female    Generalized anxiety disorder    Generalized osteoarthritis    Hyperlipidemia    Osteopenia    Palpitations    Vaginal atrophy     Past Medical History:   Diagnosis Date    Arthritis     Hypertension     Vaginal prolapse     last assessed: 11/13/2017     Past Surgical History:   Procedure Laterality Date    BILATERAL OOPHORECTOMY      last assessed: 11/13/2017    BLADDER EXSTROPHY CLOSURE      Repair of      BONY PELVIS SURGERY      Posterior Colporrhaphy (for pelvic Relaxation) last assessed: 11/13/2017    COLONOSCOPY      last assessed: 11/13/2017    EYE SURGERY      HYSTERECTOMY      INCONTINENCE SURGERY      Removal/Revision of sling for stress incontinence  Family History   Problem Relation Age of Onset    Arthritis Mother     Heart disease Mother     Stroke Mother     Depression Mother     Substance Abuse Mother     Diabetes Father     Prostate cancer Father     Heart disease Brother     Diabetes Family      History   Smoking Status    Never Smoker   Smokeless Tobacco    Never Used     History   Alcohol Use No      History   Drug Use No       Current Outpatient Prescriptions   Medication Sig Dispense Refill    Artificial Tear Ointment (DRY EYES OP) Apply to eye      Bioflavonoid Products (C COMPLEX PO) Take 1 tablet by mouth daily      Calcium Carbonate-Vitamin D (CALCIUM-CARB 600 + D) 600-125 MG-UNIT TABS Take 1 tablet by mouth 2 (two) times a day      Coenzyme Q10 (CO Q-10) 200 MG CAPS Take by mouth      Cranberry 425 MG CAPS Take by mouth      docusate sodium (STOOL SOFTENER) 100 mg capsule Take 1 capsule by mouth      Evening Primrose Oil 1000 MG CAPS Take by mouth      Garlic 2585 MG CAPS Take by mouth      Lutein 40 MG CAPS Take by mouth      magnesium oxide (MAG-OX) 400 mg Take 1 tablet by mouth daily      Multiple Vitamin (MULTI-VITAMIN DAILY PO) Take by mouth daily      Omega-3 1000 MG CAPS Take 2 capsules by mouth 2 (two) times a day      Psyllium 400 MG CAPS Take 1 capsule by mouth daily      Turmeric (CURCUMIN 95) 500 MG CAPS Take 1 capsule by mouth daily      metoprolol succinate (TOPROL-XL) 50 mg 24 hr tablet Take 0 5 tablets (25 mg total) by mouth daily 90 tablet 0     No current facility-administered medications for this visit        Allergies   Allergen Reactions    Aspirin     Procaine      Immunization History   Administered Date(s) Administered    Pneumococcal Conjugate 13-Valent 11/13/2017       Patient Care Team:  Mustapha Bolton DO as PCP - General    Medicare Screening Tests and Risk Assessments:      Health Risk Assessment:  Patient rates overall health as very good  Patient feels that their physical health rating is Same  Eyesight was rated as Slightly worse  Hearing was rated as Same  Patient feels that their emotional and mental health rating is Same  Pain experienced by patient in the last 7 days has been None  Patient's pain rating has been 1/10  Patient states that she has experienced no weight loss or gain in last 6 months  Emotional/Mental Health:  Patient has been feeling nervous/anxious  PHQ-9 Depression Screening:    Frequency of the following problems over the past two weeks:      1  Little interest or pleasure in doing things: 1 - several days      2  Feeling down, depressed, or hopeless: 3 - nearly every day      3  Trouble falling or staying asleep, or sleeping too much: 0 - not at all      4  Feeling tired or having little energy: 1 - several days      5  Poor appetite or overeatin - several days      6  Feeling bad about yourself - or that you are a failure or have let yourself or your family down: 1 - several days      7  Trouble concentrating on things, such as reading the newspaper or watching television: 1 - several days      8  Moving or speaking so slowly that other people could have noticed  Or the opposite - being so fidgety or restless that you have been moving around a lot more than usual: 0 - not at all      9  Thoughts that you would be better off dead, or of hurting yourself in some way: 1 - several days  PHQ-2 Score: 4  PHQ-9 Score: 11    Broken Bones/Falls: Fall Risk Assessment:    In the past year, patient has experienced: No history of falling in past year          Bladder/Bowel:  Patient has not leaked urine accidently in the last six months  Patient reports no loss of bowel control  Immunizations:  Patient has not had a flu vaccination within the last year  Patient has received a pneumonia shot    Patient has not received a shingles shot   Patient has not received tetanus/diphtheria shot  Home Safety:  Patient does not have trouble with stairs inside or outside of their home  Patient currently reports that there are no safety hazards present in home, working smoke alarms, working carbon monoxide detectors  Preventative Screenings:   Breast cancer screening performed, colon cancer screen completed, cholesterol screen completed, glaucoma eye exam completed,     Nutrition:  Current diet: Low Cholesterol, Regular and Limited junk food with servings of the following:    Medications:  Patient is currently taking over-the-counter supplements  List of OTC medications includes: in medication lsit  Patient is able to manage medications  Lifestyle Choices:  Patient reports no tobacco use  Patient has not smoked or used tobacco in the past   Patient reports no alcohol use  Patient drives a vehicle  Patient wears seat belt  Activities of Daily Living:  Can get out of bed by his or her self, able to dress self, able to make own meals, able to do own shopping, able to bathe self, can do own laundry/housekeeping, can manage own money, pay bills and track expenses    Previous Hospitalizations:  No hospitalization or ED visit in past 12 months        Advanced Directives:  Patient has decided on a power of   Patient has spoken to designated power of   Patient has completed advanced directive          Preventative Screening/Counseling:      Cardiovascular:      General: Screening Current      Counseling: Healthy Diet, Healthy Weight, Improve Cholesterol and Improve Blood Pressure     Due for Labs/Analytes/Optional EKG: Lipid Panel          Diabetes:      General: Screening Current      Counseling: Healthy Diet, Healthy Weight and Improve Physical Activity      Due for labs: Blood Glucose          Colorectal Cancer:      General: Risks and Benefits Discussed and Patient Declines      Counseling: high fiber diet Breast Cancer:      General: Screening Current          Cervical Cancer:      General: Screening Not Indicated          Osteoporosis:      General: Screening Current      Counseling: Calcium and Vitamin D Intake and Regular Weightbearing Exercise          AAA:      General: Screening Not Indicated          Glaucoma:      General: Screening Current          HIV:      General: Screening Not Indicated          Hepatitis C:      Additional Comments: Need to order next visit  Advanced Directives:   Patient has living will for healthcare, has durable POA for healthcare, patient has an advanced directive  Information on ACP and/or AD provided  No 5 wishes given  No end of life assessment reviewed with patient  Provider does not agree with end of life deisions  Additional Comments: Need to get copy for the chart and will review then  Immunizations:      Influenza: Influenza Recommended Annually  Additional Comments: Due for second pneumonia vaccine in the winter  Other Preventative Counseling (Non-Medicare): Fall Prevention, Nutrition Counseling and Car/seat belt/driving safety reviewed      A/P: Doing well and no falls  Some depression and FARZAD due to loss of pets  Feels safe at home  DIverse diet  No problems operating a MV and uses seatbelts  Doesn't drive at night  Has a living will, but need copy for the chart  No DME or referrals needed today  RTC one year for medicare wellness

## 2018-08-27 NOTE — PATIENT INSTRUCTIONS
Chronic Hypertension   AMBULATORY CARE:   Hypertension  is high blood pressure (BP)  Your BP is the force of your blood moving against the walls of your arteries  Normal BP is less than 120/80  Prehypertension is between 120/80 and 139/89  Hypertension is 140/90 or higher  Hypertension causes your BP to get so high that your heart has to work much harder than normal  This can damage your heart  Chronic hypertension is a long-term condition that you can control with a healthy lifestyle or medicines  A controlled blood pressure helps protect your organs, such as your heart, lungs, brain, and kidneys  Common symptoms include the following:   · Headache     · Blurred vision    · Chest pain     · Dizziness or weakness     · Trouble breathing     · Nosebleeds  Call 911 for any of the following:   · You have discomfort in your chest that feels like squeezing, pressure, fullness, or pain  · You become confused or have difficulty speaking  · You suddenly feel lightheaded or have trouble breathing  · You have pain or discomfort in your back, neck, jaw, stomach, or arm  Seek care immediately if:   · You have a severe headache or vision loss  · You have weakness in an arm or leg  Contact your healthcare provider if:   · You feel faint, dizzy, confused, or drowsy  · You have been taking your BP medicine and your BP is still higher than your healthcare provider says it should be  · You have questions or concerns about your condition or care  Treatment for chronic hypertension  may include medicine to lower your BP and lower your cholesterol level  A low cholesterol level helps prevent heart disease and makes it easier to control your blood pressure  Heart disease can make your blood pressure harder to control  You may also need to make lifestyle changes  Take your medicine exactly as directed    Manage chronic hypertension:  Talk with your healthcare provider about these and other ways to manage hypertension:  · Take your BP at home  Sit and rest for 5 minutes before you take your BP  Extend your arm and support it on a flat surface  Your arm should be at the same level as your heart  Follow the directions that came with your BP monitor  If possible, take at least 2 BP readings each time  Take your BP at least twice a day at the same times each day, such as morning and evening  Keep a record of your BP readings and bring it to your follow-up visits  Ask your healthcare provider what your blood pressure should be  · Limit sodium (salt) as directed  Too much sodium can affect your fluid balance  Check labels to find low-sodium or no-salt-added foods  Some low-sodium foods use potassium salts for flavor  Too much potassium can also cause health problems  Your healthcare provider will tell you how much sodium and potassium are safe for you to have in a day  He or she may recommend that you limit sodium to 2,300 mg a day  · Follow the meal plan recommended by your healthcare provider  A dietitian or your provider can give you more information on low-sodium plans or the DASH (Dietary Approaches to Stop Hypertension) eating plan  The DASH plan is low in sodium, unhealthy fats, and total fat  It is high in potassium, calcium, and fiber  · Exercise to maintain a healthy weight  Exercise at least 30 minutes per day, on most days of the week  This will help decrease your blood pressure  Ask about the best exercise plan for you  · Decrease stress  This may help lower your BP  Learn ways to relax, such as deep breathing or listening to music  · Limit alcohol  Women should limit alcohol to 1 drink a day  Men should limit alcohol to 2 drinks a day  A drink of alcohol is 12 ounces of beer, 5 ounces of wine, or 1½ ounces of liquor  · Do not smoke  Nicotine and other chemicals in cigarettes and cigars can increase your BP and also cause lung damage   Ask your healthcare provider for information if you currently smoke and need help to quit  E-cigarettes or smokeless tobacco still contain nicotine  Talk to your healthcare provider before you use these products  Follow up with your healthcare provider as directed: You will need to return to have your BP checked and to have other lab tests done  Write down your questions so you remember to ask them during your visits  © 2017 [address] Information is for End User's use only and may not be sold, redistributed or otherwise used for commercial purposes  All illustrations and images included in CareNotes® are the copyrighted property of A [employer] M [employer] Inc  or [employer] Sanz  The above information is an  only  It is not intended as medical advice for individual conditions or treatments  Talk to your doctor, nurse or pharmacist before following any medical regimen to see if it is safe and effective for you  Obesity   AMBULATORY CARE:   Obesity  is when your body mass index (BMI) is greater than 30  Your healthcare provider will use your height and weight to measure your BMI  The risks of obesity include  many health problems, such as injuries or physical disability  You may need tests to check for the following:  · Diabetes     · High blood pressure or high cholesterol     · Heart disease     · Gallbladder or liver disease     · Cancer of the colon, breast, prostate, liver, or kidney     · Sleep apnea     · Arthritis or gout  Seek care immediately if:   · You have a severe headache, confusion, or difficulty speaking  · You have weakness on one side of your body  · You have chest pain, sweating, or shortness of breath  Contact your healthcare provider if:   · You have symptoms of gallbladder or liver disease, such as pain in your upper abdomen  · You have knee or hip pain and discomfort while walking       · You have symptoms of diabetes, such as intense hunger and thirst, and frequent urination  · You have symptoms of sleep apnea, such as snoring or daytime sleepiness  · You have questions or concerns about your condition or care  Treatment for obesity  focuses on helping you lose weight to improve your health  Even a small decrease in BMI can reduce the risk for many health problems  Your healthcare provider will help you set a weight-loss goal   · Lifestyle changes  are the first step in treating obesity  These include making healthy food choices and getting regular physical activity  Your healthcare provider may suggest a weight-loss program that involves coaching, education, and therapy  · Medicine  may help you lose weight when it is used with a healthy diet and physical activity  · Surgery  can help you lose weight if you are very obese and have other health problems  There are several types of weight-loss surgery  Ask your healthcare provider for more information  Be successful losing weight:   · Set small, realistic goals  An example of a small goal is to walk for 20 minutes 5 days a week  Anther goal is to lose 5% of your body weight  · Tell friends, family members, and coworkers about your goals  and ask for their support  Ask a friend to lose weight with you, or join a weight-loss support group  · Identify foods or triggers that may cause you to overeat , and find ways to avoid them  Remove tempting high-calorie foods from your home and workplace  Place a bowl of fresh fruit on your kitchen counter  If stress causes you to eat, then find other ways to cope with stress  · Keep a diary to track what you eat and drink  Also write down how many minutes of physical activity you do each day  Weigh yourself once a week and record it in your diary  Eating changes: You will need to eat 500 to 1,000 fewer calories each day than you currently eat to lose 1 to 2 pounds a week  The following changes will help you cut calories:  · Eat smaller portions    Use small plates, no larger than 9 inches in diameter  Fill your plate half full of fruits and vegetables  Measure your food using measuring cups until you know what a serving size looks like  · Eat 3 meals and 1 or 2 snacks each day  Plan your meals in advance  Alejandro Kenneth and eat at home most of the time  Eat slowly  · Eat fruits and vegetables at every meal   They are low in calories and high in fiber, which makes you feel full  Do not add butter, margarine, or cream sauce to vegetables  Use herbs to season steamed vegetables  · Eat less fat and fewer fried foods  Eat more baked or grilled chicken and fish  These protein sources are lower in calories and fat than red meat  Limit fast food  Dress your salads with olive oil and vinegar instead of bottled dressing  · Limit the amount of sugar you eat  Do not drink sugary beverages  Limit alcohol  Activity changes:  Physical activity is good for your body in many ways  It helps you burn calories and build strong muscles  It decreases stress and depression, and improves your mood  It can also help you sleep better  Talk to your healthcare provider before you begin an exercise program   · Exercise for at least 30 minutes 5 days a week  Start slowly  Set aside time each day for physical activity that you enjoy and that is convenient for you  It is best to do both weight training and an activity that increases your heart rate, such as walking, bicycling, or swimming  · Find ways to be more active  Do yard work and housecleaning  Walk up the stairs instead of using elevators  Spend your leisure time going to events that require walking, such as outdoor festivals or fairs  This extra physical activity can help you lose weight and keep it off  Follow up with your healthcare provider as directed: You may need to meet with a dietitian  Write down your questions so you remember to ask them during your visits     © 2017 [address] Information is for End User's use only and may not be sold, redistributed or otherwise used for commercial purposes  All illustrations and images included in CareNotes® are the copyrighted property of A D A M , Inc  or [employer] Sanz  The above information is an  only  It is not intended as medical advice for individual conditions or treatments  Talk to your doctor, nurse or pharmacist before following any medical regimen to see if it is safe and effective for you  Urinary Incontinence   WHAT YOU NEED TO KNOW:   What is urinary incontinence? Urinary incontinence (UI) is when you lose control of your bladder  What causes UI? UI occurs because your bladder cannot store or empty urine properly  The following are the most common types of UI:  · Stress incontinence  is when you leak urine due to increased bladder pressure  This may happen when you cough, sneeze, or exercise  · Urge incontinence  is when you feel the need to urinate right away and leak urine accidentally  · Mixed incontinence  is when you have both stress and urge UI  What are the signs and symptoms of UI?   · You feel like your bladder does not empty completely when you urinate  · You urinate often and need to urinate immediately  · You leak urine when you sleep, or you wake up with the urge to urinate  · You leak urine when you cough, sneeze, exercise, or laugh  How is UI diagnosed? Your healthcare provider will ask how often you leak urine and whether you have stress or urge symptoms  Tell him which medicines you take, how often you urinate, and how much liquid you drink each day  You may need any of the following tests:  · Urine tests  may show infection or kidney function  · A pelvic exam  may be done to check for blockages  A pelvic exam will also show if your bladder, uterus, or other organs have moved out of place  · An x-ray, ultrasound, or CT  may show problems with parts of your urinary system   You may be given contrast liquid to help your organs show up better in the pictures  Tell the healthcare provider if you have ever had an allergic reaction to contrast liquid  Do not enter the MRI room with anything metal  Metal can cause serious injury  Tell the healthcare provider if you have any metal in or on your body  · A bladder scan  will show how much urine is left in your bladder after you urinate  You will be asked to urinate and then healthcare providers will use a small ultrasound machine to check the urine left in your bladder  · Cystometry  is used to check the function of your urinary system  Your healthcare provider checks the pressure in your bladder while filling it with fluid  Your bladder pressure may also be tested when your bladder is full and while you urinate  How is UI treated? · Medicines  can help strengthen your bladder control  · Electrical stimulation  is used to send a small amount of electrical energy to your pelvic floor muscles  This helps control your bladder function  Electrodes may be placed outside your body or in your rectum  For women, the electrodes may be placed in the vagina  · A bulking agent  may be injected into the wall of your urethra to make it thicker  This helps keep your urethra closed and decreases urine leakage  · Devices  such as a clamp, pessary, or tampon may help stop urine leaks  Ask your healthcare provider for more information about these and other devices  · Surgery  may be needed if other treatments do not work  Several types of surgery can help improve your bladder control  Ask your healthcare provider for more information about the surgery you may need  How can I manage my symptoms? · Do pelvic muscle exercises often  Your pelvic muscles help you stop urinating  Squeeze these muscles tight for 5 seconds, then relax for 5 seconds  Gradually work up to squeezing for 10 seconds  Do 3 sets of 15 repetitions a day, or as directed   This will help strengthen your pelvic muscles and improve bladder control  · A catheter  may be used to help empty your bladder  A catheter is a tiny, plastic tube that is put into your bladder to drain your urine  Your healthcare provider may tell you to use a catheter to prevent your bladder from getting too full and leaking urine  · Keep a UI record  Write down how often you leak urine and how much you leak  Make a note of what you were doing when you leaked urine  · Train your bladder  Go to the bathroom at set times, such as every 2 hours, even if you do not feel the urge to go  You can also try to hold your urine when you feel the urge to go  For example, hold your urine for 5 minutes when you feel the urge to go  As that becomes easier, hold your urine for 10 minutes  · Drink liquids as directed  Ask your healthcare provider how much liquid to drink each day and which liquids are best for you  You may need to limit the amount of liquid you drink to help control your urine leakage  Limit or do not have drinks that contain caffeine or alcohol  Do not drink any liquid right before you go to bed  · Prevent constipation  Eat a variety of high-fiber foods  Good examples are high-fiber cereals, beans, vegetables, and whole-grain breads  Prune juice may help make your bowel movement softer  Walking is the best way to trigger your intestines to have a bowel movement  · Exercise regularly and maintain a healthy weight  Ask your healthcare provider how much you should weigh and about the best exercise plan for you  Weight loss and exercise will decrease pressure on your bladder and help you control your leakage  Ask him to help you create a weight loss plan if you are overweight  When should I seek immediate care? · You have severe pain  · You are confused or cannot think clearly  When should I contact my healthcare provider? · You have a fever  · You see blood in your urine      · You have pain when you urinate  · You have new or worse pain, even after treatment  · Your mouth feels dry or you have vision changes  · Your urine is cloudy or smells bad  · You have questions or concerns about your condition or care  CARE AGREEMENT:   You have the right to help plan your care  Learn about your health condition and how it may be treated  Discuss treatment options with your caregivers to decide what care you want to receive  You always have the right to refuse treatment  The above information is an  only  It is not intended as medical advice for individual conditions or treatments  Talk to your doctor, nurse or pharmacist before following any medical regimen to see if it is safe and effective for you  © 2017 [address] Information is for End User's use only and may not be sold, redistributed or otherwise used for commercial purposes  All illustrations and images included in CareNotes® are the copyrighted property of [employer]  or [employer]  Cigarette Smoking and Your Health   AMBULATORY CARE:   Risks to your health if you smoke:  Nicotine and other chemicals found in tobacco damage every cell in your body  Even if you are a light smoker, you have an increased risk for cancer, heart disease, and lung disease  If you are pregnant or have diabetes, smoking increases your risk for complications  Benefits to your health if you stop smoking:   · You decrease respiratory symptoms such as coughing, wheezing, and shortness of breath  · You reduce your risk for cancers of the lung, mouth, throat, kidney, bladder, pancreas, stomach, and cervix  If you already have cancer, you increase the benefits of chemotherapy  You also reduce your risk for cancer returning or a second cancer from developing  · You reduce your risk for heart disease, blood clots, heart attack, and stroke       · You reduce your risk for lung infections, and diseases such as pneumonia, asthma, chronic bronchitis, and emphysema  · Your circulation improves  More oxygen can be delivered to your body  If you have diabetes, you lower your risk for complications, such as kidney, artery, and eye diseases  You also lower your risk for nerve damage  Nerve damage can lead to amputations, poor vision, and blindness  · You improve your body's ability to heal and to fight infections  Benefits to the health of others if you stop smoking:  Tobacco is harmful to nonsmokers who breathe in your secondhand smoke  The following are ways the health of others around you may improve when you stop smoking:  · You lower the risks for lung cancer and heart disease in nonsmoking adults  · If you are pregnant, you lower the risk for miscarriage, early delivery, low birth weight, and stillbirth  You also lower your baby's risk for SIDS, obesity, developmental delay, and neurobehavioral problems, such as ADHD  · If you have children, you lower their risk for ear infections, colds, pneumonia, bronchitis, and asthma  For more information and support to stop smoking:   · [employer]  Phone: 8- 737 - [phone]  Web Address: www [employer]  Follow up with your healthcare provider as directed:  Write down your questions so you remember to ask them during your visits  © 2017 [address]  Information is for End User's use only and may not be sold, redistributed or otherwise used for commercial purposes  All illustrations and images included in CareNotes® are the copyrighted property of [employer]  or Byron Sanz  The above information is an  only  It is not intended as medical advice for individual conditions or treatments  Talk to your doctor, nurse or pharmacist before following any medical regimen to see if it is safe and effective for you    Fall Prevention   AMBULATORY CARE:   Fall prevention  includes ways to make your home and other areas safer  It also includes ways you can move more carefully to prevent a fall  Health conditions that cause changes in your blood pressure, vision, or muscle strength and coordination may increase your risk for falls  Medicines may also increase your risk for falls if they make you dizzy, weak, or sleepy  Call 911 or have someone else call if:   · You have fallen and are unconscious  · You have fallen and cannot move part of your body  Contact your healthcare provider if:   · You have fallen and have pain or a headache  · You have questions or concerns about your condition or care  Fall prevention tips:   · Stand or sit up slowly  This may help you keep your balance and prevent falls  · Use assistive devices as directed  Your healthcare provider may suggest that you use a cane or walker to help you keep your balance  You may need to have grab bars put in your bathroom near the toilet or in the shower  · Wear shoes that fit well and have soles that   Wear shoes both inside and outside  Use slippers with good   Do not wear shoes with high heels  · Wear a personal alarm  This is a device that allows you to call 911 if you fall and need help  Ask your healthcare provider for more information  · Stay active  Exercise can help strengthen your muscles and improve your balance  Your healthcare provider may recommend water aerobics or walking  He or she may also recommend physical therapy to improve your coordination  Never start an exercise program without talking to your healthcare provider first      · Manage your medical conditions  Keep all appointments with your healthcare providers  Visit your eye doctor as directed  Home safety tips:   · Add items to prevent falls in the bathroom  Put nonslip strips on your bath or shower floor to prevent you from slipping  Use a bath mat if you do not have carpet in the bathroom   This will prevent you from falling when you step out of the bath or shower  Use a shower seat so you do not need to stand while you shower  Sit on the toilet or a chair in your bathroom to dry yourself and put on clothing  This will prevent you from losing your balance from drying or dressing yourself while you are standing  · Keep paths clear  Remove books, shoes, and other objects from walkways and stairs  Place cords for telephones and lamps out of the way so that you do not need to walk over them  Tape them down if you cannot move them  Remove small rugs  If you cannot remove a rug, secure it with double-sided tape  This will prevent you from tripping  · Install bright lights in your home  Use night lights to help light paths to the bathroom or kitchen  Always turn on the light before you start walking  · Keep items you use often on shelves within reach  Do not use a step stool to help you reach an item  · Paint or place reflective tape on the edges of your stairs  This will help you see the stairs better  Follow up with your healthcare provider as directed:  Write down your questions so you remember to ask them during your visits  © 2017 [address] [facility] Information is for End User's use only and may not be sold, redistributed or otherwise used for commercial purposes  All illustrations and images included in CareNotes® are the copyrighted property of [employer]  or Byron Sanz  The above information is an  only  It is not intended as medical advice for individual conditions or treatments  Talk to your doctor, nurse or pharmacist before following any medical regimen to see if it is safe and effective for you  Advance Directives   WHAT YOU NEED TO KNOW:   What are advance directives? Advance directives are legal documents that state your wishes and plans for medical care  These plans are made ahead of time in case you lose your ability to make decisions for yourself   Advance directives can apply to any medical decision, such as the treatments you want, and if you want to donate organs  What are the types of advance directives? There are many types of advance directives, and each state has rules about how to use them  You may choose a combination of any of the following:  · Living will: This is a written record of the treatment you want  You can also choose which treatments you do not want, which to limit, and which to stop at a certain time  This includes surgery, medicine, IV fluid, and tube feedings  · Durable power of  for healthcare [facility]): This is a written record that states who you want to make healthcare choices for you when you are unable to make them for yourself  This person, called a proxy, is usually a family member or a friend  You may choose more than 1 proxy  · Do not resuscitate (DNR) order:  A DNR order is used in case your heart stops beating or you stop breathing  It is a request not to have certain forms of treatment, such as CPR  A DNR order may be included in other types of advance directives  · Medical directive: This covers the care that you want if you are in a coma, near death, or unable to make decisions for yourself  You can list the treatments you want for each condition  Treatment may include pain medicine, surgery, blood transfusions, dialysis, IV or tube feedings, and a ventilator (breathing machine)  · Values history: This document has questions about your views, beliefs, and how you feel and think about life  This information can help others choose the care that you would choose  Why are advance directives important? An advance directive helps you control your care  Although spoken wishes may be used, it is better to have your wishes written down  Spoken wishes can be misunderstood, or not followed  Treatments may be given even if you do not want them  An advance directive may make it easier for your family to make difficult choices about your care     How do I decide what to put in my advance directives? · Make informed decisions:  Make sure you fully understand treatments or care you may receive  Think about the benefits and problems your decisions could cause for you or your family  Talk to healthcare providers if you have concerns or questions before you write down your wishes  You may also want to talk with your [employer] or , or a   Check your state laws to make sure that what you put in your advance directive is legal      · Sign all forms:  Sign and date your advance directive when you have finished  You may also need 2 witnesses to sign the forms  Witnesses cannot be your doctor or his staff, your spouse, heirs or beneficiaries, people you owe money to, or your chosen proxy  Talk to your family, proxy, and healthcare providers about your advance directive  Give each person a copy, and keep one for yourself in a place you can get to easily  Do not keep it hidden or locked away  · Review and revise your plans: You can revise your advance directive at any time, as long as you are able to make decisions  Review your plan every year, and when there are changes in your life, or your health  When you make changes, let your family, proxy, and healthcare providers know  Give each a new copy  Where can I find more information? · [employer]  [address] , [address]  Phone: [phone]  Phone: [phone]  Web Address: http://www  aafp org  · [address] [facility])  [address] [facility] [place], [address]  Phone: [phone]  Phone: [phone]  Web Address: Rosalva suarez  CARE AGREEMENT:   You have the right to help plan your care  To help with this plan, you must learn about your health condition and treatment options  You must also learn about advance directives and how they are used   Work with your healthcare providers to decide what care will be used to treat you  You always have the right to refuse treatment  The above information is an  only  It is not intended as medical advice for individual conditions or treatments  Talk to your doctor, nurse or pharmacist before following any medical regimen to see if it is safe and effective for you  © 2017 [address] Information is for End User's use only and may not be sold, redistributed or otherwise used for commercial purposes  All illustrations and images included in CareNotes® are the copyrighted property of A D A M , Inc  or Byron Sanz

## 2018-08-27 NOTE — PROGRESS NOTES
Assessment/Plan:    No problem-specific Assessment & Plan notes found for this encounter  Diagnoses and all orders for this visit:    Benign essential hypertension  -     Comprehensive metabolic panel; Future  -     Discontinue: metoprolol succinate (TOPROL-XL) 50 mg 24 hr tablet; Take 0 5 tablets (25 mg total) by mouth daily  -     metoprolol succinate (TOPROL-XL) 50 mg 24 hr tablet; Take 0 5 tablets (25 mg total) by mouth daily    Mixed hyperlipidemia  -     LDL cholesterol, direct; Future  -     Triglycerides; Future    Generalized anxiety disorder  -     Discontinue: metoprolol succinate (TOPROL-XL) 50 mg 24 hr tablet; Take 0 5 tablets (25 mg total) by mouth daily  -     metoprolol succinate (TOPROL-XL) 50 mg 24 hr tablet; Take 0 5 tablets (25 mg total) by mouth daily    Osteopenia, unspecified location    Generalized osteoarthritis    Medicare annual wellness visit, initial    Positive depression screening  -     Discontinue: metoprolol succinate (TOPROL-XL) 50 mg 24 hr tablet; Take 0 5 tablets (25 mg total) by mouth daily  -     metoprolol succinate (TOPROL-XL) 50 mg 24 hr tablet; Take 0 5 tablets (25 mg total) by mouth daily    Palpitations  -     Discontinue: metoprolol succinate (TOPROL-XL) 50 mg 24 hr tablet; Take 0 5 tablets (25 mg total) by mouth daily  -     metoprolol succinate (TOPROL-XL) 50 mg 24 hr tablet; Take 0 5 tablets (25 mg total) by mouth daily    Other orders  -     Artificial Tear Ointment (DRY EYES OP); Apply to eye      A/P: Doing ok except for the borderline depression and the FARZAD  BP is up and has h/o palpitations, so will try beta blocker  Discussed additional meds for FARZAD, but declines  Due for flu shot in several weeks  Had CRC several years ago and is refusing all forms currently  Due for mammo and dexa next visit  Continue current treatment otherwise and RTC four weeks for f/u FARZAD and BP  Consider second pneumonia vaccine as well at that time       Subjective:      Patient ID: Mak Castaneda is a 70 y o  female  WF RTC for f/u Htn, Hyperlipidemia, etc  Doing well, but reports increase anxiety and depression for the past several months after the loss of several pet chickens  Cries a drop of the hat  Sad about the whole way things turned out  Some trouble relaxing and concentrating  No suicidal ideations    Remains active w/o difficulty and no falls reported  Allergies are controlled  Due for labs and CRC  The following portions of the patient's history were reviewed and updated as appropriate:   She  has a past medical history of Arthritis; Hypertension; and Vaginal prolapse  She   Patient Active Problem List    Diagnosis Date Noted    Allergic rhinitis 11/13/2017    Benign essential hypertension 11/13/2017    Dyspareunia, female 11/13/2017    Generalized anxiety disorder 11/13/2017    Generalized osteoarthritis 11/13/2017    Hyperlipidemia 11/13/2017    Osteopenia 11/13/2017    Palpitations 11/13/2017    Vaginal atrophy 11/13/2017     She  has a past surgical history that includes Colonoscopy; Hysterectomy; Bilateral oophorectomy; Bony pelvis surgery; Incontinence surgery; Bladder exstrophy closure; and Eye surgery  Her family history includes Arthritis in her mother; Depression in her mother; Diabetes in her family and father; Heart disease in her brother and mother; Prostate cancer in her father; Stroke in her mother; Substance Abuse in her mother  She  reports that she has never smoked  She has never used smokeless tobacco  She reports that she does not drink alcohol or use drugs    Current Outpatient Prescriptions   Medication Sig Dispense Refill    Artificial Tear Ointment (DRY EYES OP) Apply to eye      Bioflavonoid Products (C COMPLEX PO) Take 1 tablet by mouth daily      Calcium Carbonate-Vitamin D (CALCIUM-CARB 600 + D) 600-125 MG-UNIT TABS Take 1 tablet by mouth 2 (two) times a day      Coenzyme Q10 (CO Q-10) 200 MG CAPS Take by mouth      Cranberry 425 MG CAPS Take by mouth      docusate sodium (STOOL SOFTENER) 100 mg capsule Take 1 capsule by mouth      Evening Primrose Oil 1000 MG CAPS Take by mouth      Garlic 8045 MG CAPS Take by mouth      Lutein 40 MG CAPS Take by mouth      magnesium oxide (MAG-OX) 400 mg Take 1 tablet by mouth daily      Multiple Vitamin (MULTI-VITAMIN DAILY PO) Take by mouth daily      Omega-3 1000 MG CAPS Take 2 capsules by mouth 2 (two) times a day      Psyllium 400 MG CAPS Take 1 capsule by mouth daily      Turmeric (CURCUMIN 95) 500 MG CAPS Take 1 capsule by mouth daily      metoprolol succinate (TOPROL-XL) 50 mg 24 hr tablet Take 0 5 tablets (25 mg total) by mouth daily 90 tablet 0     No current facility-administered medications for this visit  Current Outpatient Prescriptions on File Prior to Visit   Medication Sig    Bioflavonoid Products (C COMPLEX PO) Take 1 tablet by mouth daily    Calcium Carbonate-Vitamin D (CALCIUM-CARB 600 + D) 600-125 MG-UNIT TABS Take 1 tablet by mouth 2 (two) times a day    Coenzyme Q10 (CO Q-10) 200 MG CAPS Take by mouth    Cranberry 425 MG CAPS Take by mouth    docusate sodium (STOOL SOFTENER) 100 mg capsule Take 1 capsule by mouth    Evening Primrose Oil 1000 MG CAPS Take by mouth    Garlic 6704 MG CAPS Take by mouth    Lutein 40 MG CAPS Take by mouth    magnesium oxide (MAG-OX) 400 mg Take 1 tablet by mouth daily    Multiple Vitamin (MULTI-VITAMIN DAILY PO) Take by mouth daily    Omega-3 1000 MG CAPS Take 2 capsules by mouth 2 (two) times a day    Psyllium 400 MG CAPS Take 1 capsule by mouth daily    Turmeric (CURCUMIN 95) 500 MG CAPS Take 1 capsule by mouth daily    [DISCONTINUED] DUREZOL 0 05 % EMUL     [DISCONTINUED] ketorolac (ACULAR) 0 5 % ophthalmic solution     [DISCONTINUED] ofloxacin (OCUFLOX) 0 3 % ophthalmic solution      No current facility-administered medications on file prior to visit  She is allergic to aspirin and procaine       Review of Systems   Constitutional: Negative for activity change, chills, diaphoresis, fatigue and fever  HENT: Negative  Eyes: Negative for visual disturbance  Respiratory: Negative for cough, chest tightness, shortness of breath and wheezing  Cardiovascular: Negative for chest pain, palpitations and leg swelling  Gastrointestinal: Negative for abdominal pain, constipation, diarrhea, nausea and vomiting  Endocrine: Negative for cold intolerance and heat intolerance  Genitourinary: Negative for difficulty urinating, dysuria and frequency  Musculoskeletal: Negative for arthralgias, gait problem and myalgias  Allergic/Immunologic: Positive for environmental allergies  Neurological: Negative for dizziness, seizures, syncope, weakness, light-headedness and headaches  Psychiatric/Behavioral: Positive for decreased concentration and dysphoric mood  Negative for confusion, self-injury, sleep disturbance and suicidal ideas  The patient is nervous/anxious  Objective:      /74 (BP Location: Left arm, Patient Position: Sitting, Cuff Size: Adult)   Pulse 72   Temp 97 6 °F (36 4 °C) (Tympanic)   Resp 18   Ht 5' 2" (1 575 m)   Wt 51 7 kg (114 lb)   SpO2 98%   BMI 20 85 kg/m²          Physical Exam   Constitutional: She is oriented to person, place, and time  She appears well-developed and well-nourished  No distress  HENT:   Head: Normocephalic and atraumatic  Mouth/Throat: Oropharynx is clear and moist    Eyes: Conjunctivae and EOM are normal  Pupils are equal, round, and reactive to light  Neck: Neck supple  No JVD present  Cardiovascular: Normal rate, regular rhythm and normal heart sounds  No murmur heard  Pulmonary/Chest: Effort normal and breath sounds normal  No respiratory distress  She has no wheezes  Abdominal: Soft  Bowel sounds are normal  She exhibits no distension  There is no tenderness  Musculoskeletal: She exhibits no edema     Neurological: She is alert and oriented to person, place, and time  Psychiatric: Her behavior is normal  Judgment and thought content normal    Crying and tearful  Anxious  Nursing note and vitals reviewed

## 2018-08-30 LAB
ALBUMIN SERPL-MCNC: 4.4 G/DL (ref 3.5–4.8)
ALBUMIN/GLOB SERPL: 2.1 {RATIO} (ref 1.2–2.2)
ALP SERPL-CCNC: 62 IU/L (ref 39–117)
ALT SERPL-CCNC: 20 IU/L (ref 0–32)
AST SERPL-CCNC: 24 IU/L (ref 0–40)
BILIRUB SERPL-MCNC: 0.3 MG/DL (ref 0–1.2)
BUN SERPL-MCNC: 11 MG/DL (ref 8–27)
BUN/CREAT SERPL: 14 (ref 12–28)
CALCIUM SERPL-MCNC: 9.3 MG/DL (ref 8.7–10.3)
CHLORIDE SERPL-SCNC: 97 MMOL/L (ref 96–106)
CO2 SERPL-SCNC: 28 MMOL/L (ref 20–29)
CREAT SERPL-MCNC: 0.76 MG/DL (ref 0.57–1)
GLOBULIN SER-MCNC: 2.1 G/DL (ref 1.5–4.5)
GLUCOSE SERPL-MCNC: 89 MG/DL (ref 65–99)
LDLC SERPL DIRECT ASSAY-MCNC: 119 MG/DL (ref 0–99)
POTASSIUM SERPL-SCNC: 4.5 MMOL/L (ref 3.5–5.2)
PROT SERPL-MCNC: 6.5 G/DL (ref 6–8.5)
SL AMB EGFR AFRICAN AMERICAN: 88 ML/MIN/1.73
SL AMB EGFR NON AFRICAN AMERICAN: 76 ML/MIN/1.73
SODIUM SERPL-SCNC: 141 MMOL/L (ref 134–144)
TRIGL SERPL-MCNC: 76 MG/DL (ref 0–149)

## 2018-09-28 ENCOUNTER — OFFICE VISIT (OUTPATIENT)
Dept: INTERNAL MEDICINE CLINIC | Facility: CLINIC | Age: 78
End: 2018-09-28
Payer: MEDICARE

## 2018-09-28 VITALS
HEART RATE: 70 BPM | DIASTOLIC BLOOD PRESSURE: 74 MMHG | SYSTOLIC BLOOD PRESSURE: 126 MMHG | WEIGHT: 116.2 LBS | BODY MASS INDEX: 21.25 KG/M2 | TEMPERATURE: 99.2 F | OXYGEN SATURATION: 97 % | RESPIRATION RATE: 16 BRPM

## 2018-09-28 DIAGNOSIS — I10 BENIGN ESSENTIAL HYPERTENSION: Primary | ICD-10-CM

## 2018-09-28 DIAGNOSIS — F41.1 GENERALIZED ANXIETY DISORDER: ICD-10-CM

## 2018-09-28 PROCEDURE — 99213 OFFICE O/P EST LOW 20 MIN: CPT | Performed by: INTERNAL MEDICINE

## 2018-09-28 NOTE — PATIENT INSTRUCTIONS

## 2018-09-28 NOTE — PROGRESS NOTES
Assessment/Plan:    No problem-specific Assessment & Plan notes found for this encounter  Diagnoses and all orders for this visit:    Benign essential hypertension    Generalized anxiety disorder      A/P: Doing much better and BP is good  Recommend pt take the med at HS instead and see if the lightheadedness improves  Maintain hydration  If s/s persists, reeval  Hold on change in meds for FARZAD  Defers the flu vaccine again  Continue current treatment otherwise  RTC three months for routine  Subjective:      Patient ID: Nelida German is a 70 y o  female  WF RTC f/u Htn and FARZAD  Started the pt on metoprolol  Doing much better  BP is good and only some lightheadedness immediately after taking the med in the AM  FARZAD and palpations are better  No new issues  Tolerating the meds otherwise  NO wheezing or SOB  The following portions of the patient's history were reviewed and updated as appropriate:   She  has a past medical history of Arthritis; Generalized anxiety disorder; Hypertension; and Vaginal prolapse  She   Patient Active Problem List    Diagnosis Date Noted    Allergic rhinitis 11/13/2017    Benign essential hypertension 11/13/2017    Dyspareunia, female 11/13/2017    Generalized anxiety disorder 11/13/2017    Generalized osteoarthritis 11/13/2017    Hyperlipidemia 11/13/2017    Osteopenia 11/13/2017    Palpitations 11/13/2017    Vaginal atrophy 11/13/2017     She  has a past surgical history that includes Colonoscopy; Hysterectomy; Bilateral oophorectomy; Bony pelvis surgery; Incontinence surgery; Bladder exstrophy closure; Eye surgery; and Cataract extraction  Her family history includes Arthritis in her mother; Depression in her mother; Diabetes in her family and father; Heart disease in her brother and mother; Prostate cancer in her father; Stroke in her mother; Substance Abuse in her mother  She  reports that she has never smoked   She has never used smokeless tobacco  She reports that she does not drink alcohol or use drugs  Current Outpatient Prescriptions   Medication Sig Dispense Refill    Artificial Tear Ointment (DRY EYES OP) Apply to eye      Bioflavonoid Products (C COMPLEX PO) Take 1 tablet by mouth daily      Calcium Carbonate-Vitamin D (CALCIUM-CARB 600 + D) 600-125 MG-UNIT TABS Take 1 tablet by mouth 2 (two) times a day      Coenzyme Q10 (CO Q-10) 200 MG CAPS Take by mouth      Cranberry 425 MG CAPS Take by mouth      docusate sodium (STOOL SOFTENER) 100 mg capsule Take 1 capsule by mouth      Evening Primrose Oil 1000 MG CAPS Take by mouth      Garlic 3692 MG CAPS Take by mouth      Lutein 40 MG CAPS Take by mouth      magnesium oxide (MAG-OX) 400 mg Take 1 tablet by mouth daily      metoprolol succinate (TOPROL-XL) 50 mg 24 hr tablet Take 0 5 tablets (25 mg total) by mouth daily 90 tablet 0    Multiple Vitamin (MULTI-VITAMIN DAILY PO) Take by mouth daily      Omega-3 1000 MG CAPS Take 2 capsules by mouth 2 (two) times a day      Psyllium 400 MG CAPS Take 1 capsule by mouth daily      Turmeric (CURCUMIN 95) 500 MG CAPS Take 1 capsule by mouth daily       No current facility-administered medications for this visit        Current Outpatient Prescriptions on File Prior to Visit   Medication Sig    Artificial Tear Ointment (DRY EYES OP) Apply to eye    Bioflavonoid Products (C COMPLEX PO) Take 1 tablet by mouth daily    Calcium Carbonate-Vitamin D (CALCIUM-CARB 600 + D) 600-125 MG-UNIT TABS Take 1 tablet by mouth 2 (two) times a day    Coenzyme Q10 (CO Q-10) 200 MG CAPS Take by mouth    Cranberry 425 MG CAPS Take by mouth    docusate sodium (STOOL SOFTENER) 100 mg capsule Take 1 capsule by mouth    Evening Primrose Oil 1000 MG CAPS Take by mouth    Garlic 1324 MG CAPS Take by mouth    Lutein 40 MG CAPS Take by mouth    magnesium oxide (MAG-OX) 400 mg Take 1 tablet by mouth daily    metoprolol succinate (TOPROL-XL) 50 mg 24 hr tablet Take 0 5 tablets (25 mg total) by mouth daily    Multiple Vitamin (MULTI-VITAMIN DAILY PO) Take by mouth daily    Omega-3 1000 MG CAPS Take 2 capsules by mouth 2 (two) times a day    Psyllium 400 MG CAPS Take 1 capsule by mouth daily    Turmeric (CURCUMIN 95) 500 MG CAPS Take 1 capsule by mouth daily     No current facility-administered medications on file prior to visit  She is allergic to aspirin and procaine       Review of Systems   Constitutional: Negative for activity change, chills, diaphoresis, fatigue and fever  Respiratory: Negative for cough, chest tightness, shortness of breath and wheezing  Cardiovascular: Negative for chest pain, palpitations and leg swelling  Gastrointestinal: Negative for abdominal pain, constipation, diarrhea, nausea and vomiting  Genitourinary: Negative for difficulty urinating, dysuria and frequency  Musculoskeletal: Negative for arthralgias, gait problem and myalgias  Neurological: Positive for light-headedness  Negative for dizziness, tremors, seizures, weakness and headaches  Psychiatric/Behavioral: Negative for confusion  The patient is nervous/anxious  Objective:      /74 (BP Location: Left arm, Patient Position: Sitting, Cuff Size: Standard)   Pulse 70   Temp 99 2 °F (37 3 °C)   Resp 16   Wt 52 7 kg (116 lb 3 2 oz)   SpO2 97%   BMI 21 25 kg/m²          Physical Exam   Constitutional: She is oriented to person, place, and time  She appears well-developed and well-nourished  No distress  HENT:   Head: Normocephalic and atraumatic  Mouth/Throat: Oropharynx is clear and moist    Eyes: Pupils are equal, round, and reactive to light  Conjunctivae and EOM are normal    Neck: Neck supple  No JVD present  Cardiovascular: Normal rate, regular rhythm and normal heart sounds  No murmur heard  Pulmonary/Chest: Effort normal and breath sounds normal  No respiratory distress  She has no wheezes  Abdominal: Soft   Bowel sounds are normal  She exhibits no distension  There is no tenderness  Musculoskeletal: She exhibits no edema  Neurological: She is alert and oriented to person, place, and time  Psychiatric: She has a normal mood and affect  Her behavior is normal  Judgment and thought content normal    Nursing note and vitals reviewed

## 2018-10-12 ENCOUNTER — TELEPHONE (OUTPATIENT)
Dept: INTERNAL MEDICINE CLINIC | Facility: CLINIC | Age: 78
End: 2018-10-12

## 2018-10-12 NOTE — TELEPHONE ENCOUNTER
----- Message from Wong Mckay DO sent at 9/28/2018 10:24 AM EDT -----  Call pt, in two weeks and get up date on the bp and lightheadedness

## 2018-12-28 ENCOUNTER — OFFICE VISIT (OUTPATIENT)
Dept: INTERNAL MEDICINE CLINIC | Facility: CLINIC | Age: 78
End: 2018-12-28
Payer: MEDICARE

## 2018-12-28 VITALS
HEIGHT: 62 IN | RESPIRATION RATE: 14 BRPM | BODY MASS INDEX: 21.71 KG/M2 | SYSTOLIC BLOOD PRESSURE: 128 MMHG | WEIGHT: 118 LBS | DIASTOLIC BLOOD PRESSURE: 66 MMHG | TEMPERATURE: 98.3 F | HEART RATE: 72 BPM

## 2018-12-28 DIAGNOSIS — R00.2 PALPITATIONS: ICD-10-CM

## 2018-12-28 DIAGNOSIS — M85.80 OSTEOPENIA, UNSPECIFIED LOCATION: ICD-10-CM

## 2018-12-28 DIAGNOSIS — Z13.1 SCREENING FOR DIABETES MELLITUS (DM): ICD-10-CM

## 2018-12-28 DIAGNOSIS — E78.2 MIXED HYPERLIPIDEMIA: ICD-10-CM

## 2018-12-28 DIAGNOSIS — F41.1 GENERALIZED ANXIETY DISORDER: ICD-10-CM

## 2018-12-28 DIAGNOSIS — I10 BENIGN ESSENTIAL HYPERTENSION: Primary | ICD-10-CM

## 2018-12-28 DIAGNOSIS — M15.9 GENERALIZED OSTEOARTHRITIS: ICD-10-CM

## 2018-12-28 PROCEDURE — 99214 OFFICE O/P EST MOD 30 MIN: CPT | Performed by: INTERNAL MEDICINE

## 2018-12-28 NOTE — PROGRESS NOTES
Assessment/Plan:    No problem-specific Assessment & Plan notes found for this encounter  Diagnoses and all orders for this visit:    Benign essential hypertension  -     CBC and differential; Future  -     Microalbumin / creatinine urine ratio    Generalized osteoarthritis    Generalized anxiety disorder    Mixed hyperlipidemia  -     TSH, 3rd generation with Free T4 reflex; Future    Palpitations  -     TSH, 3rd generation with Free T4 reflex; Future    Osteopenia, unspecified location    Screening for diabetes mellitus (DM)  -     Hemoglobin A1C; Future      A/P: Doing well and will check labs  Declines flu shot and defers second pneumonia vaccine until after the holidays  Dexa due next year  Continue current treatment and RTC four months for routine  Subjective:      Patient ID: Tiffani Ramsay is a 70 y o  female  WF RTC for f/u htn, hyperlipidemia, etc  Doing well and no new issues  Remains active w/o difficulty and no falls  FARZAD and allergies are controlled  DJD is no worse  Due for labs and vaccines  The following portions of the patient's history were reviewed and updated as appropriate:   She  has a past medical history of Arthritis; Generalized anxiety disorder; Hypertension; and Vaginal prolapse  She   Patient Active Problem List    Diagnosis Date Noted    Allergic rhinitis 11/13/2017    Benign essential hypertension 11/13/2017    Dyspareunia, female 11/13/2017    Generalized anxiety disorder 11/13/2017    Generalized osteoarthritis 11/13/2017    Hyperlipidemia 11/13/2017    Osteopenia 11/13/2017    Palpitations 11/13/2017    Vaginal atrophy 11/13/2017     She  has a past surgical history that includes Colonoscopy; Hysterectomy; Bilateral oophorectomy; Bony pelvis surgery; Incontinence surgery; Bladder exstrophy closure; Eye surgery; and Cataract extraction    Her family history includes Arthritis in her mother; Depression in her mother; Diabetes in her family and father; Heart disease in her brother and mother; Prostate cancer in her father; Stroke in her mother; Substance Abuse in her mother  She  reports that she has never smoked  She has never used smokeless tobacco  She reports that she does not drink alcohol or use drugs  Current Outpatient Prescriptions   Medication Sig Dispense Refill    Artificial Tear Ointment (DRY EYES OP) Apply to eye      Bioflavonoid Products (C COMPLEX PO) Take 1 tablet by mouth daily      Calcium Carbonate-Vitamin D (CALCIUM-CARB 600 + D) 600-125 MG-UNIT TABS Take 1 tablet by mouth 2 (two) times a day      Coenzyme Q10 (CO Q-10) 200 MG CAPS Take by mouth      Cranberry 425 MG CAPS Take by mouth      docusate sodium (STOOL SOFTENER) 100 mg capsule Take 1 capsule by mouth      Evening Primrose Oil 1000 MG CAPS Take by mouth      Garlic 4785 MG CAPS Take by mouth      Lutein 40 MG CAPS Take by mouth      magnesium oxide (MAG-OX) 400 mg Take 1 tablet by mouth daily      metoprolol succinate (TOPROL-XL) 50 mg 24 hr tablet Take 0 5 tablets (25 mg total) by mouth daily 90 tablet 0    Multiple Vitamin (MULTI-VITAMIN DAILY PO) Take by mouth daily      Omega-3 1000 MG CAPS Take 2 capsules by mouth 2 (two) times a day      Turmeric (CURCUMIN 95) 500 MG CAPS Take 1 capsule by mouth daily       No current facility-administered medications for this visit        Current Outpatient Prescriptions on File Prior to Visit   Medication Sig    Artificial Tear Ointment (DRY EYES OP) Apply to eye    Bioflavonoid Products (C COMPLEX PO) Take 1 tablet by mouth daily    Calcium Carbonate-Vitamin D (CALCIUM-CARB 600 + D) 600-125 MG-UNIT TABS Take 1 tablet by mouth 2 (two) times a day    Coenzyme Q10 (CO Q-10) 200 MG CAPS Take by mouth    Cranberry 425 MG CAPS Take by mouth    docusate sodium (STOOL SOFTENER) 100 mg capsule Take 1 capsule by mouth    Evening Primrose Oil 1000 MG CAPS Take by mouth    Garlic 4527 MG CAPS Take by mouth    Lutein 40 MG CAPS Take by mouth    magnesium oxide (MAG-OX) 400 mg Take 1 tablet by mouth daily    metoprolol succinate (TOPROL-XL) 50 mg 24 hr tablet Take 0 5 tablets (25 mg total) by mouth daily    Multiple Vitamin (MULTI-VITAMIN DAILY PO) Take by mouth daily    Omega-3 1000 MG CAPS Take 2 capsules by mouth 2 (two) times a day    Turmeric (CURCUMIN 95) 500 MG CAPS Take 1 capsule by mouth daily    [DISCONTINUED] Psyllium 400 MG CAPS Take 1 capsule by mouth daily     No current facility-administered medications on file prior to visit  She is allergic to aspirin and procaine       Review of Systems   Constitutional: Negative for activity change, chills, diaphoresis, fatigue and fever  HENT: Negative  Eyes: Negative for visual disturbance  Respiratory: Negative for cough, chest tightness, shortness of breath and wheezing  Cardiovascular: Negative for chest pain, palpitations and leg swelling  Gastrointestinal: Negative for abdominal pain, constipation, diarrhea, nausea and vomiting  Endocrine: Negative for cold intolerance and heat intolerance  Genitourinary: Negative for difficulty urinating, dysuria and frequency  Musculoskeletal: Negative for arthralgias, gait problem and myalgias  Neurological: Negative for dizziness, seizures, syncope, weakness, light-headedness and headaches  Psychiatric/Behavioral: Negative for confusion, dysphoric mood and sleep disturbance  The patient is not nervous/anxious  Objective:      /66   Pulse 72   Temp 98 3 °F (36 8 °C) (Tympanic)   Resp 14   Ht 5' 2" (1 575 m)   Wt 53 5 kg (118 lb)   BMI 21 58 kg/m²          Physical Exam   Constitutional: She is oriented to person, place, and time  She appears well-developed and well-nourished  No distress  HENT:   Head: Normocephalic and atraumatic  Mouth/Throat: Oropharynx is clear and moist    Eyes: Pupils are equal, round, and reactive to light  Conjunctivae and EOM are normal    Neck: Neck supple   No JVD present  Cardiovascular: Normal rate, regular rhythm and normal heart sounds  No murmur heard  Pulmonary/Chest: Effort normal and breath sounds normal  No respiratory distress  She has no wheezes  She has no rales  Abdominal: Soft  Bowel sounds are normal  She exhibits no distension  There is no tenderness  Musculoskeletal: She exhibits no edema  Neurological: She is alert and oriented to person, place, and time  Psychiatric: She has a normal mood and affect  Her behavior is normal  Judgment and thought content normal    Nursing note and vitals reviewed

## 2018-12-28 NOTE — PATIENT INSTRUCTIONS

## 2019-01-10 LAB
ALBUMIN/CREAT UR: <7.6 MG/G CREAT (ref 0–30)
BASOPHILS # BLD AUTO: 0.1 X10E3/UL (ref 0–0.2)
BASOPHILS NFR BLD AUTO: 1 %
CREAT UR-MCNC: 39.4 MG/DL
EOSINOPHIL # BLD AUTO: 0.2 X10E3/UL (ref 0–0.4)
EOSINOPHIL NFR BLD AUTO: 3 %
ERYTHROCYTE [DISTWIDTH] IN BLOOD BY AUTOMATED COUNT: 13.2 % (ref 12.3–15.4)
HBA1C MFR BLD: 5.3 % (ref 4.8–5.6)
HCT VFR BLD AUTO: 40.9 % (ref 34–46.6)
HGB BLD-MCNC: 14 G/DL (ref 11.1–15.9)
IMM GRANULOCYTES # BLD: 0 X10E3/UL (ref 0–0.1)
IMM GRANULOCYTES NFR BLD: 0 %
LYMPHOCYTES # BLD AUTO: 2 X10E3/UL (ref 0.7–3.1)
LYMPHOCYTES NFR BLD AUTO: 35 %
MCH RBC QN AUTO: 30.4 PG (ref 26.6–33)
MCHC RBC AUTO-ENTMCNC: 34.2 G/DL (ref 31.5–35.7)
MCV RBC AUTO: 89 FL (ref 79–97)
MICROALBUMIN UR-MCNC: <3 UG/ML
MONOCYTES # BLD AUTO: 0.4 X10E3/UL (ref 0.1–0.9)
MONOCYTES NFR BLD AUTO: 7 %
NEUTROPHILS # BLD AUTO: 2.9 X10E3/UL (ref 1.4–7)
NEUTROPHILS NFR BLD AUTO: 54 %
PLATELET # BLD AUTO: 275 X10E3/UL (ref 150–379)
RBC # BLD AUTO: 4.61 X10E6/UL (ref 3.77–5.28)
TSH SERPL DL<=0.005 MIU/L-ACNC: 1.21 UIU/ML (ref 0.45–4.5)
WBC # BLD AUTO: 5.5 X10E3/UL (ref 3.4–10.8)

## 2019-02-03 DIAGNOSIS — I10 BENIGN ESSENTIAL HYPERTENSION: ICD-10-CM

## 2019-02-03 DIAGNOSIS — R00.2 PALPITATIONS: ICD-10-CM

## 2019-02-03 DIAGNOSIS — F41.1 GENERALIZED ANXIETY DISORDER: ICD-10-CM

## 2019-02-03 DIAGNOSIS — Z13.31 POSITIVE DEPRESSION SCREENING: ICD-10-CM

## 2019-02-03 RX ORDER — METOPROLOL SUCCINATE 50 MG/1
TABLET, EXTENDED RELEASE ORAL
Qty: 90 TABLET | Refills: 0 | Status: SHIPPED | OUTPATIENT
Start: 2019-02-03 | End: 2019-08-02 | Stop reason: SDUPTHER

## 2019-04-29 ENCOUNTER — OFFICE VISIT (OUTPATIENT)
Dept: INTERNAL MEDICINE CLINIC | Facility: CLINIC | Age: 79
End: 2019-04-29
Payer: MEDICARE

## 2019-04-29 VITALS
HEART RATE: 73 BPM | WEIGHT: 123.25 LBS | OXYGEN SATURATION: 98 % | HEIGHT: 62 IN | BODY MASS INDEX: 22.68 KG/M2 | DIASTOLIC BLOOD PRESSURE: 60 MMHG | TEMPERATURE: 98 F | SYSTOLIC BLOOD PRESSURE: 120 MMHG

## 2019-04-29 DIAGNOSIS — M85.80 OSTEOPENIA, UNSPECIFIED LOCATION: ICD-10-CM

## 2019-04-29 DIAGNOSIS — I10 BENIGN ESSENTIAL HYPERTENSION: Primary | ICD-10-CM

## 2019-04-29 DIAGNOSIS — J30.9 ALLERGIC RHINITIS, UNSPECIFIED SEASONALITY, UNSPECIFIED TRIGGER: ICD-10-CM

## 2019-04-29 DIAGNOSIS — M15.9 GENERALIZED OSTEOARTHRITIS: ICD-10-CM

## 2019-04-29 DIAGNOSIS — E78.2 MIXED HYPERLIPIDEMIA: ICD-10-CM

## 2019-04-29 DIAGNOSIS — F41.1 GENERALIZED ANXIETY DISORDER: ICD-10-CM

## 2019-04-29 PROCEDURE — 99214 OFFICE O/P EST MOD 30 MIN: CPT | Performed by: INTERNAL MEDICINE

## 2019-05-04 LAB
ALBUMIN SERPL-MCNC: 4.3 G/DL (ref 3.5–4.8)
ALBUMIN/GLOB SERPL: 1.9 {RATIO} (ref 1.2–2.2)
ALP SERPL-CCNC: 64 IU/L (ref 39–117)
ALT SERPL-CCNC: 28 IU/L (ref 0–32)
AST SERPL-CCNC: 27 IU/L (ref 0–40)
BILIRUB SERPL-MCNC: 0.3 MG/DL (ref 0–1.2)
BUN SERPL-MCNC: 14 MG/DL (ref 8–27)
BUN/CREAT SERPL: 19 (ref 12–28)
CALCIUM SERPL-MCNC: 9.7 MG/DL (ref 8.7–10.3)
CHLORIDE SERPL-SCNC: 99 MMOL/L (ref 96–106)
CHOLEST SERPL-MCNC: 174 MG/DL (ref 100–199)
CO2 SERPL-SCNC: 29 MMOL/L (ref 20–29)
CREAT SERPL-MCNC: 0.74 MG/DL (ref 0.57–1)
GLOBULIN SER-MCNC: 2.3 G/DL (ref 1.5–4.5)
GLUCOSE SERPL-MCNC: 90 MG/DL (ref 65–99)
HDLC SERPL-MCNC: 58 MG/DL
LDLC SERPL CALC-MCNC: 102 MG/DL (ref 0–99)
POTASSIUM SERPL-SCNC: 4.6 MMOL/L (ref 3.5–5.2)
PROT SERPL-MCNC: 6.6 G/DL (ref 6–8.5)
SL AMB EGFR AFRICAN AMERICAN: 91 ML/MIN/1.73
SL AMB EGFR NON AFRICAN AMERICAN: 79 ML/MIN/1.73
SL AMB VLDL CHOLESTEROL CALC: 14 MG/DL (ref 5–40)
SODIUM SERPL-SCNC: 140 MMOL/L (ref 134–144)
TRIGL SERPL-MCNC: 71 MG/DL (ref 0–149)

## 2019-08-02 DIAGNOSIS — I10 BENIGN ESSENTIAL HYPERTENSION: ICD-10-CM

## 2019-08-02 DIAGNOSIS — Z13.31 POSITIVE DEPRESSION SCREENING: ICD-10-CM

## 2019-08-02 DIAGNOSIS — R00.2 PALPITATIONS: ICD-10-CM

## 2019-08-02 DIAGNOSIS — F41.1 GENERALIZED ANXIETY DISORDER: ICD-10-CM

## 2019-08-02 RX ORDER — METOPROLOL SUCCINATE 50 MG/1
TABLET, EXTENDED RELEASE ORAL
Qty: 90 TABLET | Refills: 4 | Status: SHIPPED | OUTPATIENT
Start: 2019-08-02 | End: 2019-12-02

## 2019-10-29 ENCOUNTER — OFFICE VISIT (OUTPATIENT)
Dept: INTERNAL MEDICINE CLINIC | Facility: CLINIC | Age: 79
End: 2019-10-29
Payer: MEDICARE

## 2019-10-29 VITALS
HEIGHT: 62 IN | SYSTOLIC BLOOD PRESSURE: 140 MMHG | HEART RATE: 77 BPM | BODY MASS INDEX: 22.63 KG/M2 | WEIGHT: 123 LBS | DIASTOLIC BLOOD PRESSURE: 80 MMHG | TEMPERATURE: 97.2 F | OXYGEN SATURATION: 99 %

## 2019-10-29 DIAGNOSIS — Z13.31 POSITIVE DEPRESSION SCREENING: ICD-10-CM

## 2019-10-29 DIAGNOSIS — F32.0 MILD MAJOR DEPRESSION, SINGLE EPISODE (HCC): ICD-10-CM

## 2019-10-29 DIAGNOSIS — E78.2 MIXED HYPERLIPIDEMIA: ICD-10-CM

## 2019-10-29 DIAGNOSIS — F41.8 ANXIETY WITH DEPRESSION: ICD-10-CM

## 2019-10-29 DIAGNOSIS — I10 BENIGN ESSENTIAL HYPERTENSION: Primary | ICD-10-CM

## 2019-10-29 DIAGNOSIS — M85.80 OSTEOPENIA, UNSPECIFIED LOCATION: ICD-10-CM

## 2019-10-29 DIAGNOSIS — Z00.00 MEDICARE ANNUAL WELLNESS VISIT, SUBSEQUENT: ICD-10-CM

## 2019-10-29 DIAGNOSIS — F43.23 ADJUSTMENT DISORDER WITH MIXED ANXIETY AND DEPRESSED MOOD: ICD-10-CM

## 2019-10-29 DIAGNOSIS — M15.9 GENERALIZED OSTEOARTHRITIS: ICD-10-CM

## 2019-10-29 DIAGNOSIS — F41.1 GENERALIZED ANXIETY DISORDER: ICD-10-CM

## 2019-10-29 PROCEDURE — 99214 OFFICE O/P EST MOD 30 MIN: CPT | Performed by: INTERNAL MEDICINE

## 2019-10-29 PROCEDURE — G0439 PPPS, SUBSEQ VISIT: HCPCS | Performed by: INTERNAL MEDICINE

## 2019-10-29 RX ORDER — ESCITALOPRAM OXALATE 10 MG/1
TABLET ORAL
Qty: 90 TABLET | Refills: 3 | Status: SHIPPED | OUTPATIENT
Start: 2019-10-29 | End: 2019-12-02

## 2019-10-29 NOTE — PROGRESS NOTES
Assessment/Plan:  Problem List Items Addressed This Visit        Cardiovascular and Mediastinum    Benign essential hypertension - Primary    Relevant Orders    Comprehensive metabolic panel       Musculoskeletal and Integument    Generalized osteoarthritis    Osteopenia       Other    Generalized anxiety disorder    Relevant Medications    escitalopram (LEXAPRO) 10 mg tablet    Hyperlipidemia    Relevant Orders    LDL cholesterol, direct    Triglycerides      Other Visit Diagnoses     Medicare annual wellness visit, subsequent        Adjustment disorder with mixed anxiety and depressed mood        Relevant Medications    escitalopram (LEXAPRO) 10 mg tablet    Positive depression screening        Mild major depression, single episode ([facility])        Relevant Medications    escitalopram (LEXAPRO) 10 mg tablet    Anxiety with depression        Relevant Medications    escitalopram (LEXAPRO) 10 mg tablet           Diagnoses and all orders for this visit:    Benign essential hypertension  -     Comprehensive metabolic panel; Future    Mixed hyperlipidemia  -     LDL cholesterol, direct; Future  -     Triglycerides; Future    Generalized anxiety disorder  -     escitalopram (LEXAPRO) 10 mg tablet; One po q AM    Medicare annual wellness visit, subsequent    Generalized osteoarthritis    Osteopenia, unspecified location    Adjustment disorder with mixed anxiety and depressed mood    Positive depression screening    Mild major depression, single episode ([address] )    Anxiety with depression    Other orders  -     Cancel: PSA, Total Screen; Future        No problem-specific Assessment & Plan notes found for this encounter  A/P: Doing well except for the FARZAD/MDD  Will check labs  Will start an SSRI and recommend counseling  Will need a flu shot when available  Continue current treatment otherwise and RTC one month for f/u FARZAD and flu shot  Subjective:      Patient ID: Neeraj Gorman is a 70 y o  female      [facility] RTC for f/u htn, hyperlipidemia, etc  Doing well and no new issues, but FARZAD is worse and now open to trying meds  Reports recent health issues in both her  and herself have limited they're activity and hobbies like dancing  This is very upsetting to her  Not suicidal   Remains active w/o difficulty and no falls  Due for labs and vaccines  The following portions of the patient's history were reviewed and updated as appropriate:   She has a past medical history of Arthritis, Generalized anxiety disorder, Hypertension, and Vaginal prolapse ,  does not have any pertinent problems on file  ,   has a past surgical history that includes Colonoscopy; Hysterectomy; Bilateral oophorectomy; Bony pelvis surgery; Incontinence surgery; Bladder exstrophy closure; Eye surgery; and Cataract extraction  ,  family history includes Arthritis in her mother; Depression in her mother; Diabetes in her family and father; Heart disease in her brother and mother; Prostate cancer in her father; Stroke in her mother; Substance Abuse in her mother  ,   reports that she has never smoked  She has never used smokeless tobacco  She reports that she does not drink alcohol or use drugs  ,  is allergic to aspirin and procaine     Current Outpatient Medications   Medication Sig Dispense Refill    Artificial Tear Ointment (DRY EYES OP) Apply to eye      Bioflavonoid Products (C COMPLEX PO) Take 1 tablet by mouth daily      Calcium Carbonate-Vitamin D (CALCIUM-CARB 600 + D) 600-125 MG-UNIT TABS Take 1 tablet by mouth 2 (two) times a day      Coenzyme Q10 (CO Q-10) 200 MG CAPS Take by mouth      Cranberry 425 MG CAPS Take by mouth      docusate sodium (STOOL SOFTENER) 100 mg capsule Take 1 capsule by mouth      Evening Primrose Oil 1000 MG CAPS Take by mouth      Garlic 7063 MG CAPS Take by mouth      Lutein 40 MG CAPS Take by mouth      metoprolol succinate (TOPROL-XL) 50 mg 24 hr tablet TAKE ONE-HALF (1/2) TABLET DAILY 90 tablet 4    Multiple Vitamin (MULTI-VITAMIN DAILY PO) Take by mouth daily      Omega-3 1000 MG CAPS Take 2 capsules by mouth 2 (two) times a day      Turmeric (CURCUMIN 95) 500 MG CAPS Take 1 capsule by mouth daily      escitalopram (LEXAPRO) 10 mg tablet One po q AM 90 tablet 3     No current facility-administered medications for this visit  Review of Systems   Constitutional: Negative for activity change, chills, diaphoresis, fatigue and fever  HENT: Negative  Eyes: Negative for visual disturbance  Respiratory: Negative for cough, chest tightness, shortness of breath and wheezing  Cardiovascular: Negative for chest pain, palpitations and leg swelling  Gastrointestinal: Negative for abdominal pain, constipation, diarrhea, nausea and vomiting  Endocrine: Negative for cold intolerance and heat intolerance  Genitourinary: Negative for difficulty urinating, dysuria and frequency  Musculoskeletal: Negative for arthralgias, gait problem and myalgias  Neurological: Negative for dizziness, seizures, syncope, weakness, light-headedness, numbness and headaches  Psychiatric/Behavioral: Positive for dysphoric mood  Negative for confusion, sleep disturbance and suicidal ideas  The patient is nervous/anxious  PHQ-9 Depression Screening    PHQ-9:    Frequency of the following problems over the past two weeks:       Little interest or pleasure in doing things:  0 - not at all  Feeling down, depressed, or hopeless:  0 - not at all  PHQ-2 Score:  0        Objective:  Vitals:    10/29/19 1059   BP: 140/80   Pulse: 77   Temp: (!) 97 2 °F (36 2 °C)   SpO2: 99%   Weight: 55 8 kg (123 lb)   Height: 5' 2" (1 575 m)     Body mass index is 22 5 kg/m²  Physical Exam   Constitutional: She is oriented to person, place, and time  She appears well-developed and well-nourished  No distress  HENT:   Head: Normocephalic and atraumatic     Mouth/Throat: Oropharynx is clear and moist    Eyes: Pupils are equal, round, and reactive to light  Conjunctivae and EOM are normal    Neck: Neck supple  No JVD present  Cardiovascular: Normal rate, regular rhythm and normal heart sounds  Pulmonary/Chest: Effort normal and breath sounds normal  No respiratory distress  She has no wheezes  She has no rales  Abdominal: Soft  Bowel sounds are normal  She exhibits no distension  There is no tenderness  Musculoskeletal: She exhibits no edema  Neurological: She is alert and oriented to person, place, and time  Psychiatric: She has a normal mood and affect  Her behavior is normal  Judgment and thought content normal    Nursing note and vitals reviewed  Depression Screening Follow-up Plan: Patient's depression screening was positive with a PHQ-2 score of 0  Their PHQ-9 score was   Patient assessed for underlying major depression  They have no active suicidal ideations  Brief counseling provided and recommend additional follow-up/re-evaluation next office visit

## 2019-10-29 NOTE — PROGRESS NOTES
Assessment and Plan:     Problem List Items Addressed This Visit        Cardiovascular and Mediastinum    Benign essential hypertension - Primary    Relevant Orders    Comprehensive metabolic panel       Musculoskeletal and Integument    Generalized osteoarthritis    Osteopenia       Other    Generalized anxiety disorder    Hyperlipidemia    Relevant Orders    LDL cholesterol, direct    Triglycerides      Other Visit Diagnoses     Medicare annual wellness visit, subsequent        Negative depression screening               Preventive health issues were discussed with patient, and age appropriate screening tests were ordered as noted in patient's After Visit Summary  Personalized health advice and appropriate referrals for health education or preventive services given if needed, as noted in patient's After Visit Summary  History of Present Illness:     Patient presents for Medicare Annual Wellness visit    Patient Care Team:  DO myra Casper PCP - General (Internal Medicine)     Problem List:     Patient Active Problem List   Diagnosis    Allergic rhinitis    Benign essential hypertension    Dyspareunia, female    Generalized anxiety disorder    Generalized osteoarthritis    Hyperlipidemia    Osteopenia    Palpitations    Vaginal atrophy      Past Medical and Surgical History:     Past Medical History:   Diagnosis Date    Arthritis     Generalized anxiety disorder     Hypertension     Vaginal prolapse     last assessed: 11/13/2017     Past Surgical History:   Procedure Laterality Date    BILATERAL OOPHORECTOMY      last assessed: 11/13/2017    BLADDER EXSTROPHY CLOSURE      Repair of      BONY PELVIS SURGERY      Posterior Colporrhaphy (for pelvic Relaxation) last assessed: 11/13/2017    CATARACT EXTRACTION      COLONOSCOPY      last assessed: 11/13/2017    EYE SURGERY      HYSTERECTOMY      INCONTINENCE SURGERY      Removal/Revision of sling for stress incontinence         Family History:     Family History   Problem Relation Age of Onset    Arthritis Mother     Heart disease Mother    Deandre Reyes Stroke Mother     Depression Mother     Substance Abuse Mother     Diabetes Father     Prostate cancer Father     Heart disease Brother     Diabetes Family       Social History:     Social History     Socioeconomic History    Marital status: /Civil Union     Spouse name: None    Number of children: 3    Years of education: None    Highest education level: None   Occupational History    Occupation: millworker and caretaker     Comment: retired   Social Needs    Financial resource strain: None    Food insecurity:     Worry: None     Inability: None    Transportation needs:     Medical: None     Non-medical: None   Tobacco Use    Smoking status: Never Smoker    Smokeless tobacco: Never Used   Substance and Sexual Activity    Alcohol use: No    Drug use: No    Sexual activity: None   Lifestyle    Physical activity:     Days per week: None     Minutes per session: None    Stress: None   Relationships    Social connections:     Talks on phone: None     Gets together: None     Attends Confucianist service: None     Active member of club or organization: None     Attends meetings of clubs or organizations: None     Relationship status: None    Intimate partner violence:     Fear of current or ex partner: None     Emotionally abused: None     Physically abused: None     Forced sexual activity: None   Other Topics Concern    None   Social History Narrative    Lives with        Medications and Allergies:     Current Outpatient Medications   Medication Sig Dispense Refill    Artificial Tear Ointment (DRY EYES OP) Apply to eye      Bioflavonoid Products (C COMPLEX PO) Take 1 tablet by mouth daily      Calcium Carbonate-Vitamin D (CALCIUM-CARB 600 + D) 600-125 MG-UNIT TABS Take 1 tablet by mouth 2 (two) times a day      Coenzyme Q10 (CO Q-10) 200 MG CAPS Take by mouth      Cranberry 425 MG CAPS Take by mouth      docusate sodium (STOOL SOFTENER) 100 mg capsule Take 1 capsule by mouth      Evening Primrose Oil 1000 MG CAPS Take by mouth      Garlic 7614 MG CAPS Take by mouth      Lutein 40 MG CAPS Take by mouth      metoprolol succinate (TOPROL-XL) 50 mg 24 hr tablet TAKE ONE-HALF (1/2) TABLET DAILY 90 tablet 4    Multiple Vitamin (MULTI-VITAMIN DAILY PO) Take by mouth daily      Omega-3 1000 MG CAPS Take 2 capsules by mouth 2 (two) times a day      Turmeric (CURCUMIN 95) 500 MG CAPS Take 1 capsule by mouth daily       No current facility-administered medications for this visit  Allergies   Allergen Reactions    Aspirin     Procaine       Immunizations:     Immunization History   Administered Date(s) Administered    Pneumococcal 01/24/2012    Pneumococcal Conjugate 13-Valent 11/13/2017      Health Maintenance:         Topic Date Due    CRC Screening: Colonoscopy  04/29/2020 (Originally 1940)         Topic Date Due    DTaP,Tdap,and Td Vaccines (1 - Tdap) 07/12/1963      Medicare Health Risk Assessment:     /80   Pulse 77   Temp (!) 97 2 °F (36 2 °C)   Ht 5' 2" (1 575 m)   Wt 55 8 kg (123 lb)   SpO2 99%   BMI 22 50 kg/m²      [facility] here for her Subsequent Wellness visit  Last Medicare Wellness visit information reviewed, patient interviewed and updates made to the record today  Health Risk Assessment:   Patient rates overall health as excellent  Patient feels that their physical health rating is much better  Eyesight was rated as same  Hearing was rated as same  Patient feels that their emotional and mental health rating is slightly worse  Pain experienced in the last 7 days has been none  Patient states that she has experienced no weight loss or gain in last 6 months  Depression Screening:   PHQ-2 Score: 0      Fall Risk Screening:    In the past year, patient has experienced: no history of falling in past year      Urinary Incontinence Screening:   Patient has not leaked urine accidently in the last six months  Home Safety:  Patient does not have trouble with stairs inside or outside of their home  Patient has working smoke alarms and has working carbon monoxide detector  Home safety hazards include: none  Nutrition:   Current diet is Regular  Medications:   Patient is currently taking over-the-counter supplements  OTC medications include: see medication list  Patient is able to manage medications  Activities of Daily Living (ADLs)/Instrumental Activities of Daily Living (IADLs):   Walk and transfer into and out of bed and chair?: Yes  Dress and groom yourself?: Yes    Bathe or shower yourself?: Yes    Feed yourself? Yes  Do your laundry/housekeeping?: Yes  Manage your money, pay your bills and track your expenses?: Yes  Make your own meals?: Yes    Do your own shopping?: Yes    Previous Hospitalizations:   Any hospitalizations or ED visits within the last 12 months?: No      Advance Care Planning:   Living will: Yes    Durable POA for healthcare:  Yes    Advanced directive: Yes    Advanced directive counseling given: Yes    Five wishes given: Yes    Patient declined ACP directive: No    End of Life Decisions reviewed with patient: Yes    Provider agrees with end of life decisions: Yes      Cognitive Screening:   Provider or family/friend/caregiver concerned regarding cognition?: No    PREVENTIVE SCREENINGS      Cardiovascular Screening:    General: Screening Not Indicated, History Lipid Disorder and Screening Current    Due for: Lipid Panel      Diabetes Screening:     General: Screening Current    Due for: Blood Glucose      Colorectal Cancer Screening:     General: Screening Current      Breast Cancer Screening:     General: Screening Current      Cervical Cancer Screening:    General: Screening Not Indicated      Osteoporosis Screening:    General: Screening Current      Abdominal Aortic Aneurysm (AAA) Screening:        General: Screening Not Indicated      Lung Cancer Screening:     General: Screening Not Indicated      Hepatitis C Screening:    General: Screening Not Indicated    Other Counseling Topics:   Car/seat belt/driving safety and calcium and vitamin D intake and regular weightbearing exercise  A/P: Doing well and no falls reported  Denies depression and feels safe at home  Diverse diet  No problems operating a MV and uses seat belts  Has a living will and POA  No DME or referrals needed today  RTC one year for medicare wellness       Jorge Lerma, DO

## 2019-10-29 NOTE — PATIENT INSTRUCTIONS
Medicare Preventive Visit Patient Instructions  Thank you for completing your Welcome to Medicare Visit or Medicare Annual Wellness Visit today  Your next wellness visit will be due in one year (10/29/2020)  The screening/preventive services that you may require over the next 5-10 years are detailed below  Some tests may not apply to you based off risk factors and/or age  Screening tests ordered at today's visit but not completed yet may show as past due  Also, please note that scanned in results may not display below  Preventive Screenings:  Service Recommendations Previous Testing/Comments   Colorectal Cancer Screening  * Colonoscopy    * Fecal Occult Blood Test (FOBT)/Fecal Immunochemical Test (FIT)  * Fecal DNA/Cologuard Test  * Flexible Sigmoidoscopy Age: 54-65 years old   Colonoscopy: every 10 years (may be performed more frequently if at higher risk)  OR  FOBT/FIT: every 1 year  OR  Cologuard: every 3 years  OR  Sigmoidoscopy: every 5 years  Screening may be recommended earlier than age 48 if at higher risk for colorectal cancer  Also, an individualized decision between you and your healthcare provider will decide whether screening between the ages of 74-80 would be appropriate  Colonoscopy: Not on file  FOBT/FIT: Not on file  Cologuard: Not on file  Sigmoidoscopy: Not on file    Screening Current     Breast Cancer Screening Age: 36 years old  Frequency: every 1-2 years  Not required if history of left and right mastectomy Mammogram: 12/04/2017    Screening Current   Cervical Cancer Screening Between the ages of 21-29, pap smear recommended once every 3 years  Between the ages of 33-67, can perform pap smear with HPV co-testing every 5 years     Recommendations may differ for women with a history of total hysterectomy, cervical cancer, or abnormal pap smears in past  Pap Smear: Not on file    Screening Not Indicated   Hepatitis C Screening Once for adults born between 1945 and 1965  More frequently in patients at high risk for Hepatitis C Hep C Antibody: Not on file       Diabetes Screening 1-2 times per year if you're at risk for diabetes or have pre-diabetes Fasting glucose: 90 mg/dL   A1C: 5 3 %    Screening Current   Cholesterol Screening Once every 5 years if you don't have a lipid disorder  May order more often based on risk factors  Lipid panel: 05/03/2019    Screening Not Indicated  History Lipid Disorder     Other Preventive Screenings Covered by Medicare:  1  Abdominal Aortic Aneurysm (AAA) Screening: covered once if your at risk  You're considered to be at risk if you have a family history of AAA  2  Lung Cancer Screening: covers low dose CT scan once per year if you meet all of the following conditions: (1) Age 50-69; (2) No signs or symptoms of lung cancer; (3) Current smoker or have quit smoking within the last 15 years; (4) You have a tobacco smoking history of at least 30 pack years (packs per day multiplied by number of years you smoked); (5) You get a written order from a healthcare provider  3  Glaucoma Screening: covered annually if you're considered high risk: (1) You have diabetes OR (2) Family history of glaucoma OR (3)  aged 48 and older OR (3)  American aged 72 and older  3  Osteoporosis Screening: covered every 2 years if you meet one of the following conditions: (1) You're estrogen deficient and at risk for osteoporosis based off medical history and other findings; (2) Have a vertebral abnormality; (3) On glucocorticoid therapy for more than 3 months; (4) Have primary hyperparathyroidism; (5) On osteoporosis medications and need to assess response to drug therapy  · Last bone density test (DXA Scan): 12/04/2017  5  HIV Screening: covered annually if you're between the age of 12-76  Also covered annually if you are younger than 13 and older than 72 with risk factors for HIV infection   For pregnant patients, it is covered up to 3 times per pregnancy  Immunizations:  Immunization Recommendations   Influenza Vaccine Annual influenza vaccination during flu season is recommended for all persons aged >= 6 months who do not have contraindications   Pneumococcal Vaccine (Prevnar and Pneumovax)  * Prevnar = PCV13  * Pneumovax = PPSV23   Adults 25-60 years old: 1-3 doses may be recommended based on certain risk factors  Adults 72 years old: Prevnar (PCV13) vaccine recommended followed by Pneumovax (PPSV23) vaccine  If already received PPSV23 since turning 65, then PCV13 recommended at least one year after PPSV23 dose  Hepatitis B Vaccine 3 dose series if at intermediate or high risk (ex: diabetes, end stage renal disease, liver disease)   Tetanus (Td) Vaccine - COST NOT COVERED BY MEDICARE PART B Following completion of primary series, a booster dose should be given every 10 years to maintain immunity against tetanus  Td may also be given as tetanus wound prophylaxis  Tdap Vaccine - COST NOT COVERED BY MEDICARE PART B Recommended at least once for all adults  For pregnant patients, recommended with each pregnancy  Shingles Vaccine (Shingrix) - COST NOT COVERED BY MEDICARE PART B  2 shot series recommended in those aged 48 and above     Health Maintenance Due:      Topic Date Due    CRC Screening: Colonoscopy  04/29/2020 (Originally 1940)     Immunizations Due:      Topic Date Due    DTaP,Tdap,and Td Vaccines (1 - Tdap) 07/12/1961     Advance Directives   What are advance directives? Advance directives are legal documents that state your wishes and plans for medical care  These plans are made ahead of time in case you lose your ability to make decisions for yourself  Advance directives can apply to any medical decision, such as the treatments you want, and if you want to donate organs  What are the types of advance directives? There are many types of advance directives, and each state has rules about how to use them   You may choose a combination of any of the following:  · Living will: This is a written record of the treatment you want  You can also choose which treatments you do not want, which to limit, and which to stop at a certain time  This includes surgery, medicine, IV fluid, and tube feedings  · Durable power of  for healthcare [place] [facility]): This is a written record that states who you want to make healthcare choices for you when you are unable to make them for yourself  This person, called a proxy, is usually a family member or a friend  You may choose more than 1 proxy  · Do not resuscitate (DNR) order:  A DNR order is used in case your heart stops beating or you stop breathing  It is a request not to have certain forms of treatment, such as CPR  A DNR order may be included in other types of advance directives  · Medical directive: This covers the care that you want if you are in a coma, near death, or unable to make decisions for yourself  You can list the treatments you want for each condition  Treatment may include pain medicine, surgery, blood transfusions, dialysis, IV or tube feedings, and a ventilator (breathing machine)  · Values history: This document has questions about your views, beliefs, and how you feel and think about life  This information can help others choose the care that you would choose  Why are advance directives important? An advance directive helps you control your care  Although spoken wishes may be used, it is better to have your wishes written down  Spoken wishes can be misunderstood, or not followed  Treatments may be given even if you do not want them  An advance directive may make it easier for your family to make difficult choices about your care  © Copyright [employer] 2018 Information is for End User's use only and may not be sold, redistributed or otherwise used for commercial purposes   All illustrations and images included in CareNotes® are the copyrighted property of [employer]  or [facility] Preventive Visit Patient Instructions  Thank you for completing your Welcome to Medicare Visit or Medicare Annual Wellness Visit today  Your next wellness visit will be due in one year (10/29/2020)  The screening/preventive services that you may require over the next 5-10 years are detailed below  Some tests may not apply to you based off risk factors and/or age  Screening tests ordered at today's visit but not completed yet may show as past due  Also, please note that scanned in results may not display below  Preventive Screenings:  Service Recommendations Previous Testing/Comments   Colorectal Cancer Screening  * Colonoscopy    * Fecal Occult Blood Test (FOBT)/Fecal Immunochemical Test (FIT)  * Fecal DNA/Cologuard Test  * Flexible Sigmoidoscopy Age: 54-65 years old   Colonoscopy: every 10 years (may be performed more frequently if at higher risk)  OR  FOBT/FIT: every 1 year  OR  Cologuard: every 3 years  OR  Sigmoidoscopy: every 5 years  Screening may be recommended earlier than age 48 if at higher risk for colorectal cancer  Also, an individualized decision between you and your healthcare provider will decide whether screening between the ages of 74-80 would be appropriate  Colonoscopy: Not on file  FOBT/FIT: Not on file  Cologuard: Not on file  Sigmoidoscopy: Not on file    Screening Current     Breast Cancer Screening Age: 36 years old  Frequency: every 1-2 years  Not required if history of left and right mastectomy Mammogram: 12/04/2017    Screening Current   Cervical Cancer Screening Between the ages of 21-29, pap smear recommended once every 3 years  Between the ages of 33-67, can perform pap smear with HPV co-testing every 5 years     Recommendations may differ for women with a history of total hysterectomy, cervical cancer, or abnormal pap smears in past  Pap Smear: Not on file    Screening Not Indicated   Hepatitis C Screening Once for adults born between [facility]  More frequently in patients at high risk for Hepatitis C Hep C Antibody: Not on file       Diabetes Screening 1-2 times per year if you're at risk for diabetes or have pre-diabetes Fasting glucose: 90 mg/dL   A1C: 5 3 %    Screening Current   Cholesterol Screening Once every 5 years if you don't have a lipid disorder  May order more often based on risk factors  Lipid panel: 05/03/2019    Screening Not Indicated  History Lipid Disorder     Other Preventive Screenings Covered by Medicare:  6  Abdominal Aortic Aneurysm (AAA) Screening: covered once if your at risk  You're considered to be at risk if you have a family history of AAA  7  Lung Cancer Screening: covers low dose CT scan once per year if you meet all of the following conditions: (1) Age 50-69; (2) No signs or symptoms of lung cancer; (3) Current smoker or have quit smoking within the last 15 years; (4) You have a tobacco smoking history of at least 30 pack years (packs per day multiplied by number of years you smoked); (5) You get a written order from a healthcare provider  8  Glaucoma Screening: covered annually if you're considered high risk: (1) You have diabetes OR (2) Family history of glaucoma OR (3)  aged 48 and older OR (3)  American aged 72 and older  5  Osteoporosis Screening: covered every 2 years if you meet one of the following conditions: (1) You're estrogen deficient and at risk for osteoporosis based off medical history and other findings; (2) Have a vertebral abnormality; (3) On glucocorticoid therapy for more than 3 months; (4) Have primary hyperparathyroidism; (5) On osteoporosis medications and need to assess response to drug therapy  · Last bone density test (DXA Scan): 12/04/2017   10  HIV Screening: covered annually if you're between the age of 15-65  Also covered annually if you are younger than 13 and older than 72 with risk factors for HIV infection   For pregnant patients, it is covered up to 3 times per pregnancy  Immunizations:  Immunization Recommendations   Influenza Vaccine Annual influenza vaccination during flu season is recommended for all persons aged >= 6 months who do not have contraindications   Pneumococcal Vaccine (Prevnar and Pneumovax)  * Prevnar = PCV13  * Pneumovax = PPSV23   Adults 25-60 years old: 1-3 doses may be recommended based on certain risk factors  Adults 72 years old: Prevnar (PCV13) vaccine recommended followed by Pneumovax (PPSV23) vaccine  If already received PPSV23 since turning 65, then PCV13 recommended at least one year after PPSV23 dose  Hepatitis B Vaccine 3 dose series if at intermediate or high risk (ex: diabetes, end stage renal disease, liver disease)   Tetanus (Td) Vaccine - COST NOT COVERED BY MEDICARE PART B Following completion of primary series, a booster dose should be given every 10 years to maintain immunity against tetanus  Td may also be given as tetanus wound prophylaxis  Tdap Vaccine - COST NOT COVERED BY MEDICARE PART B Recommended at least once for all adults  For pregnant patients, recommended with each pregnancy  Shingles Vaccine (Shingrix) - COST NOT COVERED BY MEDICARE PART B  2 shot series recommended in those aged 48 and above     Health Maintenance Due:      Topic Date Due    CRC Screening: Colonoscopy  04/29/2020 (Originally 1940)     Immunizations Due:      Topic Date Due    DTaP,Tdap,and Td Vaccines (1 - Tdap) 07/12/1961     Advance Directives   What are advance directives? Advance directives are legal documents that state your wishes and plans for medical care  These plans are made ahead of time in case you lose your ability to make decisions for yourself  Advance directives can apply to any medical decision, such as the treatments you want, and if you want to donate organs  What are the types of advance directives?   There are many types of advance directives, and each state has rules about how to use them  You may choose a combination of any of the following:  · Living will: This is a written record of the treatment you want  You can also choose which treatments you do not want, which to limit, and which to stop at a certain time  This includes surgery, medicine, IV fluid, and tube feedings  · Durable power of  for healthcare [place] [facility]): This is a written record that states who you want to make healthcare choices for you when you are unable to make them for yourself  This person, called a proxy, is usually a family member or a friend  You may choose more than 1 proxy  · Do not resuscitate (DNR) order:  A DNR order is used in case your heart stops beating or you stop breathing  It is a request not to have certain forms of treatment, such as CPR  A DNR order may be included in other types of advance directives  · Medical directive: This covers the care that you want if you are in a coma, near death, or unable to make decisions for yourself  You can list the treatments you want for each condition  Treatment may include pain medicine, surgery, blood transfusions, dialysis, IV or tube feedings, and a ventilator (breathing machine)  · Values history: This document has questions about your views, beliefs, and how you feel and think about life  This information can help others choose the care that you would choose  Why are advance directives important? An advance directive helps you control your care  Although spoken wishes may be used, it is better to have your wishes written down  Spoken wishes can be misunderstood, or not followed  Treatments may be given even if you do not want them  An advance directive may make it easier for your family to make difficult choices about your care  © Copyright [employer] 2018 Information is for End User's use only and may not be sold, redistributed or otherwise used for commercial purposes   All illustrations and images included in CareNotes® are the copyrighted property of [employer] M , Inc  or Maurizio Francicsa Esposito     Depression   AMBULATORY CARE:   Depression  is a medical condition that causes feelings of sadness or hopelessness that do not go away  Depression may cause you to lose interest in things you used to enjoy  These feelings may interfere with your daily life  Common symptoms include the following:   · Appetite changes, or weight gain or loss    · Trouble going to sleep or staying asleep, or sleeping too much    · Fatigue or lack of energy    · Feeling restless, irritable, or withdrawn    · Feeling worthless, hopeless, discouraged, or guilty    · Trouble concentrating, remembering things, doing daily tasks, or making decisions    · Thoughts about hurting or killing yourself  Call 911 for any of the following:   · You think about harming yourself or someone else  Contact your healthcare provider if:   · Your symptoms do not improve  · You cannot make it to your next appointment  · You have new symptoms  · You have questions or concerns about your condition or care  Treatment for depression  may include medicine to improve or balance your mood  Therapy may also be used to treat your depression  A therapist will help you learn to cope with your thoughts and feelings  Therapy can be done alone or in a group  It may also be done with family members or a significant other  Self-care:   · Get regular physical activity  Try to exercise for 30 minutes, 3 to 5 days a week  Work with your healthcare provider to develop an exercise plan that you enjoy  Physical activity may improve your symptoms  · Get enough sleep  Create a routine to help you relax before bed  You can listen to music, read, or do yoga  Try to go to bed and wake up at the same time every day  Sleep is important for emotional health  · Eat a variety of healthy foods from all of the food groups  A healthy meal plan is low in fat, salt, and added sugar   Ask your healthcare provider for more information about a meal plan that is right for you  · Do not drink alcohol or use drugs  Alcohol and drugs can make your symptoms worse  Follow up with your healthcare provider as directed: Your healthcare provider will monitor your progress at follow-up visits  He or she will also monitor your medicine if you take antidepressants  Your healthcare provider will ask if the medicine is helping  Tell him or her about any side effects or problems you may have with your medicine  The type or amount of medicine may need to be changed  Write down your questions so you remember to ask them during your visits  © 2017 [facility] Information is for End User's use only and may not be sold, redistributed or otherwise used for commercial purposes  All illustrations and images included in CareNotes® are the copyrighted property of [employer]  or Byron Sanz  The above information is an  only  It is not intended as medical advice for individual conditions or treatments  Talk to your doctor, nurse or pharmacist before following any medical regimen to see if it is safe and effective for you

## 2019-11-05 LAB
ALBUMIN SERPL-MCNC: 4.4 G/DL (ref 3.5–4.8)
ALBUMIN/GLOB SERPL: 2.1 {RATIO} (ref 1.2–2.2)
ALP SERPL-CCNC: 66 IU/L (ref 39–117)
ALT SERPL-CCNC: 20 IU/L (ref 0–32)
AST SERPL-CCNC: 23 IU/L (ref 0–40)
BILIRUB SERPL-MCNC: 0.3 MG/DL (ref 0–1.2)
BUN SERPL-MCNC: 14 MG/DL (ref 8–27)
BUN/CREAT SERPL: 19 (ref 12–28)
CALCIUM SERPL-MCNC: 9.5 MG/DL (ref 8.7–10.3)
CHLORIDE SERPL-SCNC: 98 MMOL/L (ref 96–106)
CO2 SERPL-SCNC: 28 MMOL/L (ref 20–29)
CREAT SERPL-MCNC: 0.73 MG/DL (ref 0.57–1)
GLOBULIN SER-MCNC: 2.1 G/DL (ref 1.5–4.5)
GLUCOSE SERPL-MCNC: 85 MG/DL (ref 65–99)
LDLC SERPL DIRECT ASSAY-MCNC: 122 MG/DL (ref 0–99)
POTASSIUM SERPL-SCNC: 4.6 MMOL/L (ref 3.5–5.2)
PROT SERPL-MCNC: 6.5 G/DL (ref 6–8.5)
SL AMB EGFR AFRICAN AMERICAN: 92 ML/MIN/1.73
SL AMB EGFR NON AFRICAN AMERICAN: 80 ML/MIN/1.73
SODIUM SERPL-SCNC: 138 MMOL/L (ref 134–144)
TRIGL SERPL-MCNC: 77 MG/DL (ref 0–149)

## 2019-12-02 ENCOUNTER — OFFICE VISIT (OUTPATIENT)
Dept: INTERNAL MEDICINE CLINIC | Facility: CLINIC | Age: 79
End: 2019-12-02
Payer: MEDICARE

## 2019-12-02 VITALS
BODY MASS INDEX: 22.63 KG/M2 | TEMPERATURE: 97.1 F | DIASTOLIC BLOOD PRESSURE: 80 MMHG | RESPIRATION RATE: 16 BRPM | WEIGHT: 123 LBS | SYSTOLIC BLOOD PRESSURE: 148 MMHG | HEART RATE: 74 BPM | HEIGHT: 62 IN

## 2019-12-02 DIAGNOSIS — Z13.31 POSITIVE DEPRESSION SCREENING: ICD-10-CM

## 2019-12-02 DIAGNOSIS — I10 BENIGN ESSENTIAL HYPERTENSION: ICD-10-CM

## 2019-12-02 DIAGNOSIS — F41.1 GENERALIZED ANXIETY DISORDER: Primary | ICD-10-CM

## 2019-12-02 DIAGNOSIS — R00.2 PALPITATIONS: ICD-10-CM

## 2019-12-02 DIAGNOSIS — Z23 ENCOUNTER FOR VACCINATION: ICD-10-CM

## 2019-12-02 DIAGNOSIS — Z13.31 NEGATIVE DEPRESSION SCREENING: ICD-10-CM

## 2019-12-02 PROCEDURE — 90662 IIV NO PRSV INCREASED AG IM: CPT | Performed by: INTERNAL MEDICINE

## 2019-12-02 PROCEDURE — G0008 ADMIN INFLUENZA VIRUS VAC: HCPCS | Performed by: INTERNAL MEDICINE

## 2019-12-02 PROCEDURE — 99213 OFFICE O/P EST LOW 20 MIN: CPT | Performed by: INTERNAL MEDICINE

## 2019-12-02 RX ORDER — METOPROLOL SUCCINATE 50 MG/1
50 TABLET, EXTENDED RELEASE ORAL DAILY
Qty: 90 TABLET | Refills: 3
Start: 2019-12-02 | End: 2020-01-07 | Stop reason: SDUPTHER

## 2019-12-02 NOTE — PATIENT INSTRUCTIONS
Anxiety   WHAT YOU NEED TO KNOW:   What do I need to know about anxiety? Anxiety is a condition that causes you to feel extremely worried or nervous  The feelings are so strong that they can cause problems with your daily activities or sleep  Anxiety may be triggered by something you fear, or it may happen without a cause  Family or work stress, smoking, caffeine, and alcohol can increase your risk for anxiety  Certain medicines or health conditions can also increase your risk  Anxiety can become a long-term condition if it is not managed or treated  What other common signs and symptoms may occur with anxiety? · Fatigue or muscle tightness     · Shaking, restlessness, or irritability     · Problems focusing     · Trouble sleeping     · Feeling jumpy, easily startled, or dizzy     · Rapid heartbeat or shortness of breath  What do I need to tell my healthcare provider about my anxiety? Tell your healthcare provider when your symptoms began and what triggers them  Tell your provider if anxiety affects your daily activities  Your provider will also ask about your medical history and if you have family members with a similar condition  Tell your provider about your past and present alcohol, nicotine, or drug use  What can I do to manage anxiety? You may get medicines to help you feel calm and relaxed, and to decrease your symptoms  Medicines are usually given together with therapy or other treatments  The following can help you manage anxiety:  · Talk to someone about your anxiety  Your healthcare provider may suggest counseling  Cognitive behavioral therapy can help you understand and change how you react to events that trigger your symptoms  You might feel more comfortable talking with a friend or family member about your anxiety  Choose someone you know will be supportive and encouraging  · Find ways to relax  Activities such as exercise, meditation, or listening to music can help you relax   Spend time with friends, or do things you enjoy  · Practice deep breathing  Deep breathing can help you relax when you feel anxious  Focus on taking slow, deep breaths several times a day, or during an anxiety attack  Breathe in through your nose and out through your mouth  · Create a regular sleep routine  Regular sleep can help you feel calmer during the day  Go to sleep and wake up at the same times every day  Do not watch television or use the computer right before bed  Your room should be comfortable, dark, and quiet  · Eat a variety of healthy foods  Healthy foods include fruits, vegetables, low-fat dairy products, lean meats, fish, whole-grain breads, and cooked beans  Healthy foods can help you feel less anxious and have more energy  · Exercise regularly  Exercise can increase your energy level  Exercise may also lift your mood and help you sleep better  Your healthcare provider can help you create an exercise plan  · Do not smoke  Nicotine and other chemicals in cigarettes and cigars can increase anxiety  Ask your healthcare provider for information if you currently smoke and need help to quit  E-cigarettes or smokeless tobacco still contain nicotine  Talk to your healthcare provider before you use these products  · Do not have caffeine  Caffeine can make your symptoms worse  Do not have foods or drinks that are meant to increase your energy level  · Limit or do not drink alcohol  Ask your healthcare provider if alcohol is safe for you  You may not be able to drink alcohol if you take certain anxiety or depression medicines  Limit alcohol to 1 drink per day if you are a woman  Limit alcohol to 2 drinks per day if you are a man  A drink of alcohol is 12 ounces of beer, 5 ounces of wine, or 1½ ounces of liquor  · Do not use drugs  Drugs can make your anxiety worse  It can also make anxiety hard to manage  Talk to your healthcare provider if you use drugs and want help to quit    Call 911 if:   · You have chest pain, tightness, or heaviness that may spread to your shoulders, arms, jaw, neck, or back  · You feel like hurting yourself or someone else  When should I contact my healthcare provider? · Your symptoms get worse or do not get better with treatment  · Your anxiety keeps you from doing your regular daily activities  · You have new symptoms since your last visit  · You have questions or concerns about your condition or care  CARE AGREEMENT:   You have the right to help plan your care  Learn about your health condition and how it may be treated  Discuss treatment options with your caregivers to decide what care you want to receive  You always have the right to refuse treatment  The above information is an  only  It is not intended as medical advice for individual conditions or treatments  Talk to your doctor, nurse or pharmacist before following any medical regimen to see if it is safe and effective for you  © 2017 [employer] Information is for End User's use only and may not be sold, redistributed or otherwise used for commercial purposes  All illustrations and images included in CareNotes® are the copyrighted property of A [employer] A M , Inc  or Byron Sanz

## 2019-12-02 NOTE — PROGRESS NOTES
Assessment/Plan:  Problem List Items Addressed This Visit        Cardiovascular and Mediastinum    Benign essential hypertension    Relevant Medications    metoprolol succinate (TOPROL XL) 50 mg 24 hr tablet       Other    Generalized anxiety disorder - Primary    Relevant Medications    metoprolol succinate (TOPROL XL) 50 mg 24 hr tablet    Palpitations    Relevant Medications    metoprolol succinate (TOPROL XL) 50 mg 24 hr tablet      Other Visit Diagnoses     Encounter for vaccination        Relevant Orders    influenza vaccine, high-dose, PF 0 5 mL (Completed)    Negative depression screening        Positive depression screening               Diagnoses and all orders for this visit:    Generalized anxiety disorder  -     metoprolol succinate (TOPROL XL) 50 mg 24 hr tablet; Take 1 tablet (50 mg total) by mouth daily    Encounter for vaccination  -     influenza vaccine, high-dose, PF 0 5 mL    Negative depression screening    Benign essential hypertension  -     metoprolol succinate (TOPROL XL) 50 mg 24 hr tablet; Take 1 tablet (50 mg total) by mouth daily    Positive depression screening    Palpitations  -     metoprolol succinate (TOPROL XL) 50 mg 24 hr tablet; Take 1 tablet (50 mg total) by mouth daily        No problem-specific Assessment & Plan notes found for this encounter  A/P: FARZAD is a little better  Discussed the SSRI, but declines  BP is borderline so will increase the beta blocker for the BP, palpitations, and FARZAD  Will up date her flu vaccine  Continue current treatment and RTC three months for routine  Subjective:      Patient ID: Kevin Taylor is a 70 y o  female  WF RTC for f/u labs and FARZAD after starting an SSRI  Never started the SSRI after reading the side effects  Labs were good  Still with some FARZAD  No new issues         The following portions of the patient's history were reviewed and updated as appropriate:   She has a past medical history of Arthritis, Generalized anxiety disorder, Hypertension, and Vaginal prolapse ,  does not have any pertinent problems on file  ,   has a past surgical history that includes Colonoscopy; Hysterectomy; Bilateral oophorectomy; Bony pelvis surgery; Incontinence surgery; Bladder exstrophy closure; Eye surgery; and Cataract extraction  ,  family history includes Arthritis in her mother; Depression in her mother; Diabetes in her family and father; Heart disease in her brother and mother; Prostate cancer in her father; Stroke in her mother; Substance Abuse in her mother  ,   reports that she has never smoked  She has never used smokeless tobacco  She reports that she does not drink alcohol or use drugs  ,  is allergic to aspirin and procaine     Current Outpatient Medications   Medication Sig Dispense Refill    Artificial Tear Ointment (DRY EYES OP) Apply to eye      Bioflavonoid Products (C COMPLEX PO) Take 1 tablet by mouth daily      Calcium Carbonate-Vitamin D (CALCIUM-CARB 600 + D) 600-125 MG-UNIT TABS Take 1 tablet by mouth 2 (two) times a day      Coenzyme Q10 (CO Q-10) 200 MG CAPS Take by mouth      Cranberry 425 MG CAPS Take by mouth      docusate sodium (STOOL SOFTENER) 100 mg capsule Take 1 capsule by mouth      Evening Primrose Oil 1000 MG CAPS Take by mouth      Garlic 5978 MG CAPS Take by mouth      Lutein 40 MG CAPS Take by mouth      Multiple Vitamin (MULTI-VITAMIN DAILY PO) Take by mouth daily      Omega-3 1000 MG CAPS Take 2 capsules by mouth 2 (two) times a day      Turmeric (CURCUMIN 95) 500 MG CAPS Take 1 capsule by mouth daily      metoprolol succinate (TOPROL XL) 50 mg 24 hr tablet Take 1 tablet (50 mg total) by mouth daily 90 tablet 3     No current facility-administered medications for this visit  Review of Systems   Constitutional: Negative for activity change, chills, diaphoresis, fatigue and fever  Respiratory: Negative for cough, chest tightness, shortness of breath and wheezing      Cardiovascular: Negative for chest pain, palpitations and leg swelling  Gastrointestinal: Negative for abdominal pain, constipation, diarrhea, nausea and vomiting  Genitourinary: Negative  Negative for difficulty urinating, dysuria and frequency  Musculoskeletal: Negative for arthralgias, gait problem and myalgias  Neurological: Negative for light-headedness and headaches  Psychiatric/Behavioral: Negative for confusion and dysphoric mood  The patient is nervous/anxious  All other systems reviewed and are negative  PHQ-9 Depression Screening    PHQ-9:    Frequency of the following problems over the past two weeks:       Little interest or pleasure in doing things:  0 - not at all  Feeling down, depressed, or hopeless:  0 - not at all  PHQ-2 Score:  0        Objective:  Vitals:    12/02/19 1238   BP: 148/80   Pulse: 74   Resp: 16   Temp: (!) 97 1 °F (36 2 °C)   TempSrc: Tympanic   Weight: 55 8 kg (123 lb)   Height: 5' 2" (1 575 m)     Body mass index is 22 5 kg/m²  Physical Exam   Constitutional: She is oriented to person, place, and time  She appears well-developed and well-nourished  No distress  HENT:   Head: Normocephalic and atraumatic  Mouth/Throat: Oropharynx is clear and moist    Eyes: Pupils are equal, round, and reactive to light  Conjunctivae and EOM are normal    Cardiovascular: Normal rate, regular rhythm and normal heart sounds  Pulmonary/Chest: Effort normal and breath sounds normal  No respiratory distress  She has no wheezes  She has no rales  Neurological: She is alert and oriented to person, place, and time  Psychiatric: She has a normal mood and affect  Her behavior is normal  Judgment and thought content normal    Nursing note and vitals reviewed

## 2020-01-07 DIAGNOSIS — F41.1 GENERALIZED ANXIETY DISORDER: ICD-10-CM

## 2020-01-07 DIAGNOSIS — I10 BENIGN ESSENTIAL HYPERTENSION: ICD-10-CM

## 2020-01-07 DIAGNOSIS — R00.2 PALPITATIONS: ICD-10-CM

## 2020-01-07 RX ORDER — METOPROLOL SUCCINATE 50 MG/1
50 TABLET, EXTENDED RELEASE ORAL DAILY
Qty: 90 TABLET | Refills: 3 | Status: SHIPPED | OUTPATIENT
Start: 2020-01-07 | End: 2020-12-12

## 2020-09-09 ENCOUNTER — APPOINTMENT (OUTPATIENT)
Dept: LAB | Facility: CLINIC | Age: 80
End: 2020-09-09
Payer: MEDICARE

## 2020-09-09 ENCOUNTER — OFFICE VISIT (OUTPATIENT)
Dept: INTERNAL MEDICINE CLINIC | Facility: CLINIC | Age: 80
End: 2020-09-09
Payer: MEDICARE

## 2020-09-09 VITALS
TEMPERATURE: 99.8 F | BODY MASS INDEX: 22.86 KG/M2 | OXYGEN SATURATION: 98 % | WEIGHT: 124.2 LBS | DIASTOLIC BLOOD PRESSURE: 76 MMHG | RESPIRATION RATE: 14 BRPM | HEIGHT: 62 IN | SYSTOLIC BLOOD PRESSURE: 138 MMHG | HEART RATE: 70 BPM

## 2020-09-09 DIAGNOSIS — Z13.29 SCREENING FOR THYROID DISORDER: ICD-10-CM

## 2020-09-09 DIAGNOSIS — F32.0 MILD MAJOR DEPRESSION, SINGLE EPISODE (HCC): ICD-10-CM

## 2020-09-09 DIAGNOSIS — F41.1 GENERALIZED ANXIETY DISORDER: ICD-10-CM

## 2020-09-09 DIAGNOSIS — Z13.0 SCREENING FOR DEFICIENCY ANEMIA: ICD-10-CM

## 2020-09-09 DIAGNOSIS — Z13.1 SCREENING FOR DIABETES MELLITUS (DM): ICD-10-CM

## 2020-09-09 DIAGNOSIS — I10 BENIGN ESSENTIAL HYPERTENSION: ICD-10-CM

## 2020-09-09 DIAGNOSIS — M85.80 OSTEOPENIA, UNSPECIFIED LOCATION: ICD-10-CM

## 2020-09-09 DIAGNOSIS — Z13.220 SCREENING FOR LIPID DISORDERS: ICD-10-CM

## 2020-09-09 DIAGNOSIS — E78.2 MIXED HYPERLIPIDEMIA: ICD-10-CM

## 2020-09-09 DIAGNOSIS — I10 BENIGN ESSENTIAL HYPERTENSION: Primary | ICD-10-CM

## 2020-09-09 DIAGNOSIS — Z13.31 NEGATIVE DEPRESSION SCREENING: ICD-10-CM

## 2020-09-09 LAB
ALBUMIN SERPL BCP-MCNC: 3.7 G/DL (ref 3.5–5)
ALP SERPL-CCNC: 77 U/L (ref 46–116)
ALT SERPL W P-5'-P-CCNC: 35 U/L (ref 12–78)
ANION GAP SERPL CALCULATED.3IONS-SCNC: 5 MMOL/L (ref 4–13)
AST SERPL W P-5'-P-CCNC: 26 U/L (ref 5–45)
BASOPHILS # BLD AUTO: 0.08 THOUSANDS/ΜL (ref 0–0.1)
BASOPHILS NFR BLD AUTO: 1 % (ref 0–1)
BILIRUB SERPL-MCNC: 0.37 MG/DL (ref 0.2–1)
BUN SERPL-MCNC: 12 MG/DL (ref 5–25)
CALCIUM SERPL-MCNC: 9.1 MG/DL (ref 8.3–10.1)
CHLORIDE SERPL-SCNC: 104 MMOL/L (ref 100–108)
CHOLEST SERPL-MCNC: 175 MG/DL (ref 50–200)
CO2 SERPL-SCNC: 30 MMOL/L (ref 21–32)
CREAT SERPL-MCNC: 0.73 MG/DL (ref 0.6–1.3)
CREAT UR-MCNC: 29.2 MG/DL
EOSINOPHIL # BLD AUTO: 0.12 THOUSAND/ΜL (ref 0–0.61)
EOSINOPHIL NFR BLD AUTO: 2 % (ref 0–6)
ERYTHROCYTE [DISTWIDTH] IN BLOOD BY AUTOMATED COUNT: 12.2 % (ref 11.6–15.1)
GFR SERPL CREATININE-BSD FRML MDRD: 79 ML/MIN/1.73SQ M
GLUCOSE P FAST SERPL-MCNC: 106 MG/DL (ref 65–99)
HCT VFR BLD AUTO: 40.5 % (ref 34.8–46.1)
HDLC SERPL-MCNC: 57 MG/DL
HGB BLD-MCNC: 13.8 G/DL (ref 11.5–15.4)
IMM GRANULOCYTES # BLD AUTO: 0.01 THOUSAND/UL (ref 0–0.2)
IMM GRANULOCYTES NFR BLD AUTO: 0 % (ref 0–2)
LDLC SERPL CALC-MCNC: 107 MG/DL (ref 0–100)
LDLC SERPL DIRECT ASSAY-MCNC: 103 MG/DL (ref 0–100)
LYMPHOCYTES # BLD AUTO: 1.66 THOUSANDS/ΜL (ref 0.6–4.47)
LYMPHOCYTES NFR BLD AUTO: 28 % (ref 14–44)
MCH RBC QN AUTO: 30.3 PG (ref 26.8–34.3)
MCHC RBC AUTO-ENTMCNC: 34.1 G/DL (ref 31.4–37.4)
MCV RBC AUTO: 89 FL (ref 82–98)
MICROALBUMIN UR-MCNC: <5 MG/L (ref 0–20)
MICROALBUMIN/CREAT 24H UR: <17 MG/G CREATININE (ref 0–30)
MONOCYTES # BLD AUTO: 0.49 THOUSAND/ΜL (ref 0.17–1.22)
MONOCYTES NFR BLD AUTO: 8 % (ref 4–12)
NEUTROPHILS # BLD AUTO: 3.65 THOUSANDS/ΜL (ref 1.85–7.62)
NEUTS SEG NFR BLD AUTO: 61 % (ref 43–75)
NRBC BLD AUTO-RTO: 0 /100 WBCS
PLATELET # BLD AUTO: 256 THOUSANDS/UL (ref 149–390)
PMV BLD AUTO: 9.8 FL (ref 8.9–12.7)
POTASSIUM SERPL-SCNC: 4.1 MMOL/L (ref 3.5–5.3)
PROT SERPL-MCNC: 7.4 G/DL (ref 6.4–8.2)
RBC # BLD AUTO: 4.56 MILLION/UL (ref 3.81–5.12)
SODIUM SERPL-SCNC: 139 MMOL/L (ref 136–145)
TRIGL SERPL-MCNC: 54 MG/DL
TSH SERPL DL<=0.05 MIU/L-ACNC: 1.05 UIU/ML (ref 0.36–3.74)
WBC # BLD AUTO: 6.01 THOUSAND/UL (ref 4.31–10.16)

## 2020-09-09 PROCEDURE — 80053 COMPREHEN METABOLIC PANEL: CPT

## 2020-09-09 PROCEDURE — 84443 ASSAY THYROID STIM HORMONE: CPT

## 2020-09-09 PROCEDURE — 85025 COMPLETE CBC W/AUTO DIFF WBC: CPT

## 2020-09-09 PROCEDURE — 36415 COLL VENOUS BLD VENIPUNCTURE: CPT

## 2020-09-09 PROCEDURE — 83721 ASSAY OF BLOOD LIPOPROTEIN: CPT

## 2020-09-09 PROCEDURE — 99214 OFFICE O/P EST MOD 30 MIN: CPT | Performed by: INTERNAL MEDICINE

## 2020-09-09 PROCEDURE — 80061 LIPID PANEL: CPT

## 2020-09-09 PROCEDURE — 82043 UR ALBUMIN QUANTITATIVE: CPT | Performed by: INTERNAL MEDICINE

## 2020-09-09 PROCEDURE — 82570 ASSAY OF URINE CREATININE: CPT | Performed by: INTERNAL MEDICINE

## 2020-09-09 NOTE — PROGRESS NOTES
Assessment/Plan:  Problem List Items Addressed This Visit        Cardiovascular and Mediastinum    Benign essential hypertension - Primary    Relevant Orders    Comprehensive metabolic panel    CBC and differential    Microalbumin / creatinine urine ratio       Musculoskeletal and Integument    Osteopenia       Other    Generalized anxiety disorder    Hyperlipidemia    Relevant Orders    Lipid Panel with Direct LDL reflex    TSH, 3rd generation with Free T4 reflex      Other Visit Diagnoses     Screening for diabetes mellitus (DM)        Screening for lipid disorders        Screening for thyroid disorder        Relevant Orders    TSH, 3rd generation with Free T4 reflex    Screening for deficiency anemia        Relevant Orders    CBC and differential    [facility]        Negative depression screening               Diagnoses and all orders for this visit:    Benign essential hypertension  -     Comprehensive metabolic panel; Future  -     CBC and differential; Future  -     Microalbumin / creatinine urine ratio    Mixed hyperlipidemia  -     Lipid Panel with Direct LDL reflex; Future  -     TSH, 3rd generation with Free T4 reflex; Future    Generalized anxiety disorder    Osteopenia, unspecified location    Screening for diabetes mellitus (DM)    Screening for lipid disorders    Screening for thyroid disorder  -     TSH, 3rd generation with Free T4 reflex; Future    Screening for deficiency anemia  -     CBC and differential; Future    Mild major depression, single episode (HCC)    Negative depression screening        No problem-specific Assessment & Plan notes found for this encounter  A/P: Doing well and will check labs  Suspect allergies and recommended INS trial or OTC meds, but declines  Discussed vaccines and defers flu vaccine until Oct    Continue current treatment and RTC four months for routine  Subjective:      Patient ID: Delbra Gosselin is a 68 y o  female      WF RTC for f/u htn, hyperlipidemia, etc  Doing well and no new issues,but allergies are flaring  Remains active w/o difficulty and no falls  DJD pain is manageable  FARZAD is up a little due to COVID  Due for labs and vaccines  The following portions of the patient's history were reviewed and updated as appropriate:   She has a past medical history of Arthritis, Generalized anxiety disorder, Hypertension, and Vaginal prolapse ,  does not have any pertinent problems on file  ,   has a past surgical history that includes Colonoscopy; Hysterectomy; Bilateral oophorectomy; Bony pelvis surgery; Incontinence surgery; Bladder exstrophy closure; Eye surgery; and Cataract extraction  ,  family history includes Arthritis in her mother; Depression in her mother; Diabetes in her family and father; Heart disease in her brother and mother; Prostate cancer in her father; Stroke in her mother; Substance Abuse in her mother  ,   reports that she has never smoked  She has never used smokeless tobacco  She reports that she does not drink alcohol or use drugs  ,  is allergic to aspirin and procaine     Current Outpatient Medications   Medication Sig Dispense Refill    Artificial Tear Ointment (DRY EYES OP) Apply to eye      Bioflavonoid Products (C COMPLEX PO) Take 1 tablet by mouth daily      Calcium Carbonate-Vitamin D (CALCIUM-CARB 600 + D) 600-125 MG-UNIT TABS Take 1 tablet by mouth 2 (two) times a day      Coenzyme Q10 (CO Q-10) 200 MG CAPS Take by mouth      Cranberry 425 MG CAPS Take by mouth      docusate sodium (STOOL SOFTENER) 100 mg capsule Take 1 capsule by mouth      Evening Primrose Oil 1000 MG CAPS Take by mouth      Garlic 9228 MG CAPS Take by mouth      Lutein 40 MG CAPS Take by mouth      metoprolol succinate (TOPROL XL) 50 mg 24 hr tablet Take 1 tablet (50 mg total) by mouth daily 90 tablet 3    Multiple Vitamin (MULTI-VITAMIN DAILY PO) Take by mouth daily      Omega-3 1000 MG CAPS Take 2 capsules by mouth 2 (two) times a day      Turmeric (CURCUMIN 95) 500 MG CAPS Take 1 capsule by mouth daily       No current facility-administered medications for this visit  Review of Systems   Constitutional: Negative for activity change, chills, diaphoresis, fatigue and fever  HENT: Positive for congestion, postnasal drip and rhinorrhea  Negative for ear discharge, ear pain, sinus pressure, sinus pain, sneezing and sore throat  Eyes: Negative for visual disturbance  Respiratory: Negative for cough, chest tightness, shortness of breath and wheezing  Cardiovascular: Negative for chest pain, palpitations and leg swelling  Gastrointestinal: Negative for abdominal pain, constipation, diarrhea, nausea and vomiting  Endocrine: Negative for cold intolerance and heat intolerance  Genitourinary: Negative for difficulty urinating, dysuria and frequency  Musculoskeletal: Negative for arthralgias, gait problem and myalgias  Neurological: Negative for dizziness, seizures, syncope, weakness, light-headedness and headaches  Psychiatric/Behavioral: Negative for confusion, dysphoric mood and sleep disturbance  The patient is nervous/anxious  PHQ-9 Depression Screening    PHQ-9:    Frequency of the following problems over the past two weeks:       Little interest or pleasure in doing things:  0 - not at all  Feeling down, depressed, or hopeless:  0 - not at all  PHQ-2 Score:  0        Objective:  Vitals:    09/09/20 0728   BP: 138/76   BP Location: Left arm   Patient Position: Sitting   Cuff Size: Large   Pulse: 70   Resp: 14   Temp: 99 8 °F (37 7 °C)   SpO2: 98%   Weight: 56 3 kg (124 lb 3 2 oz)   Height: 5' 2" (1 575 m)     Body mass index is 22 72 kg/m²  Physical Exam  Vitals signs and nursing note reviewed  Constitutional:       General: She is not in acute distress  Appearance: Normal appearance  HENT:      Head: Normocephalic and atraumatic        Mouth/Throat:      Mouth: Mucous membranes are moist    Eyes:      Extraocular Movements: Extraocular movements intact  Conjunctiva/sclera: Conjunctivae normal       Pupils: Pupils are equal, round, and reactive to light  Neck:      Musculoskeletal: Neck supple  Vascular: No carotid bruit  Cardiovascular:      Rate and Rhythm: Normal rate and regular rhythm  Heart sounds: Normal heart sounds  Pulmonary:      Effort: Pulmonary effort is normal  No respiratory distress  Breath sounds: Normal breath sounds  No wheezing or rales  Abdominal:      General: Bowel sounds are normal  There is no distension  Palpations: Abdomen is soft  Tenderness: There is no abdominal tenderness  Musculoskeletal:      Right lower leg: No edema  Left lower leg: No edema  Neurological:      General: No focal deficit present  Mental Status: She is alert and oriented to person, place, and time  Mental status is at baseline  Psychiatric:         Mood and Affect: Mood normal          Behavior: Behavior normal          Thought Content:  Thought content normal          Judgment: Judgment normal

## 2020-09-09 NOTE — PATIENT INSTRUCTIONS

## 2020-10-13 ENCOUNTER — IMMUNIZATIONS (OUTPATIENT)
Dept: INTERNAL MEDICINE CLINIC | Facility: CLINIC | Age: 80
End: 2020-10-13
Payer: MEDICARE

## 2020-10-13 DIAGNOSIS — Z23 ENCOUNTER FOR IMMUNIZATION: ICD-10-CM

## 2020-10-13 PROCEDURE — 90662 IIV NO PRSV INCREASED AG IM: CPT

## 2020-10-13 PROCEDURE — G0008 ADMIN INFLUENZA VIRUS VAC: HCPCS

## 2020-12-12 DIAGNOSIS — R00.2 PALPITATIONS: ICD-10-CM

## 2020-12-12 DIAGNOSIS — I10 BENIGN ESSENTIAL HYPERTENSION: ICD-10-CM

## 2020-12-12 DIAGNOSIS — F41.1 GENERALIZED ANXIETY DISORDER: ICD-10-CM

## 2020-12-12 RX ORDER — METOPROLOL SUCCINATE 50 MG/1
TABLET, EXTENDED RELEASE ORAL
Qty: 90 TABLET | Refills: 1 | Status: SHIPPED | OUTPATIENT
Start: 2020-12-12 | End: 2021-06-04

## 2021-01-19 ENCOUNTER — OFFICE VISIT (OUTPATIENT)
Dept: INTERNAL MEDICINE CLINIC | Facility: CLINIC | Age: 81
End: 2021-01-19
Payer: MEDICARE

## 2021-01-19 ENCOUNTER — APPOINTMENT (OUTPATIENT)
Dept: LAB | Facility: CLINIC | Age: 81
End: 2021-01-19
Payer: MEDICARE

## 2021-01-19 VITALS
SYSTOLIC BLOOD PRESSURE: 122 MMHG | TEMPERATURE: 98.5 F | HEIGHT: 62 IN | BODY MASS INDEX: 23.06 KG/M2 | HEART RATE: 50 BPM | WEIGHT: 125.31 LBS | OXYGEN SATURATION: 95 % | DIASTOLIC BLOOD PRESSURE: 80 MMHG

## 2021-01-19 DIAGNOSIS — M85.80 OSTEOPENIA, UNSPECIFIED LOCATION: ICD-10-CM

## 2021-01-19 DIAGNOSIS — F41.1 GENERALIZED ANXIETY DISORDER: ICD-10-CM

## 2021-01-19 DIAGNOSIS — E78.2 MIXED HYPERLIPIDEMIA: ICD-10-CM

## 2021-01-19 DIAGNOSIS — I10 BENIGN ESSENTIAL HYPERTENSION: ICD-10-CM

## 2021-01-19 DIAGNOSIS — Z00.00 MEDICARE ANNUAL WELLNESS VISIT, SUBSEQUENT: ICD-10-CM

## 2021-01-19 DIAGNOSIS — F32.0 MILD MAJOR DEPRESSION, SINGLE EPISODE (HCC): ICD-10-CM

## 2021-01-19 DIAGNOSIS — Z12.31 ENCOUNTER FOR SCREENING MAMMOGRAM FOR MALIGNANT NEOPLASM OF BREAST: ICD-10-CM

## 2021-01-19 DIAGNOSIS — I10 BENIGN ESSENTIAL HYPERTENSION: Primary | ICD-10-CM

## 2021-01-19 DIAGNOSIS — E28.39 MENOPAUSE OVARIAN FAILURE: ICD-10-CM

## 2021-01-19 DIAGNOSIS — Z13.31 NEGATIVE DEPRESSION SCREENING: ICD-10-CM

## 2021-01-19 DIAGNOSIS — M15.9 GENERALIZED OSTEOARTHRITIS: ICD-10-CM

## 2021-01-19 LAB
ALBUMIN SERPL BCP-MCNC: 4.3 G/DL (ref 3.5–5)
ALP SERPL-CCNC: 72 U/L (ref 46–116)
ALT SERPL W P-5'-P-CCNC: 33 U/L (ref 12–78)
ANION GAP SERPL CALCULATED.3IONS-SCNC: 5 MMOL/L (ref 4–13)
AST SERPL W P-5'-P-CCNC: 21 U/L (ref 5–45)
BILIRUB SERPL-MCNC: 0.45 MG/DL (ref 0.2–1)
BUN SERPL-MCNC: 11 MG/DL (ref 5–25)
CALCIUM SERPL-MCNC: 9.4 MG/DL (ref 8.3–10.1)
CHLORIDE SERPL-SCNC: 100 MMOL/L (ref 100–108)
CO2 SERPL-SCNC: 31 MMOL/L (ref 21–32)
CREAT SERPL-MCNC: 0.76 MG/DL (ref 0.6–1.3)
GFR SERPL CREATININE-BSD FRML MDRD: 75 ML/MIN/1.73SQ M
GLUCOSE P FAST SERPL-MCNC: 102 MG/DL (ref 65–99)
LDLC SERPL DIRECT ASSAY-MCNC: 114 MG/DL (ref 0–100)
POTASSIUM SERPL-SCNC: 4 MMOL/L (ref 3.5–5.3)
PROT SERPL-MCNC: 7.6 G/DL (ref 6.4–8.2)
SODIUM SERPL-SCNC: 136 MMOL/L (ref 136–145)
TRIGL SERPL-MCNC: 106 MG/DL

## 2021-01-19 PROCEDURE — 80053 COMPREHEN METABOLIC PANEL: CPT

## 2021-01-19 PROCEDURE — 1123F ACP DISCUSS/DSCN MKR DOCD: CPT | Performed by: INTERNAL MEDICINE

## 2021-01-19 PROCEDURE — G0439 PPPS, SUBSEQ VISIT: HCPCS | Performed by: INTERNAL MEDICINE

## 2021-01-19 PROCEDURE — 36415 COLL VENOUS BLD VENIPUNCTURE: CPT

## 2021-01-19 PROCEDURE — 83721 ASSAY OF BLOOD LIPOPROTEIN: CPT

## 2021-01-19 PROCEDURE — 84478 ASSAY OF TRIGLYCERIDES: CPT

## 2021-01-19 PROCEDURE — 99214 OFFICE O/P EST MOD 30 MIN: CPT | Performed by: INTERNAL MEDICINE

## 2021-01-19 NOTE — PROGRESS NOTES
Assessment/Plan:  Problem List Items Addressed This Visit        Cardiovascular and Mediastinum    Benign essential hypertension - Primary    Relevant Orders    Comprehensive metabolic panel       Musculoskeletal and Integument    Generalized osteoarthritis    Osteopenia       Other    Generalized anxiety disorder    Hyperlipidemia    Relevant Orders    LDL cholesterol, direct    Triglycerides    Mild major depression, single episode ([facility] )      Other Visit Diagnoses     Medicare annual wellness visit, subsequent        Encounter for screening mammogram for malignant neoplasm of breast        Relevant Orders    Mammo screening bilateral w 3d & cad    Menopause ovarian failure        Relevant Orders    DXA bone density spine hip and pelvis    Negative depression screening               Diagnoses and all orders for this visit:    Benign essential hypertension  -     Comprehensive metabolic panel; Future    Generalized osteoarthritis    Osteopenia, unspecified location    Generalized anxiety disorder    Mixed hyperlipidemia  -     LDL cholesterol, direct; Future  -     Triglycerides; Future    Medicare annual wellness visit, subsequent    Mild major depression, single episode ([facility] )    Encounter for screening mammogram for malignant neoplasm of breast  -     Mammo screening bilateral w 3d & cad; Future    Menopause ovarian failure  -     DXA bone density spine hip and pelvis; Future    Negative depression screening        No problem-specific Assessment & Plan notes found for this encounter  A/P: Doing well and will check labs  Continue current treatment and RTC four months for routine  Subjective:      Patient ID: Boubacar Sorto is a 68 y o  female  WF RTC for f/u htn, hyperlipidemia, etc  Doing well and no new issues  Remains active w/o difficulty and no falls  Chronic pain is manageable  FARZAD/MDD are controlled  Due for labs         The following portions of the patient's history were reviewed and updated as appropriate:   She has a past medical history of Arthritis, Generalized anxiety disorder, Hypertension, and Vaginal prolapse ,  does not have any pertinent problems on file  ,   has a past surgical history that includes Colonoscopy; Hysterectomy; Bilateral oophorectomy; Bony pelvis surgery; Incontinence surgery; Bladder exstrophy closure; Eye surgery; and Cataract extraction  ,  family history includes Arthritis in her mother; Depression in her mother; Diabetes in her family and father; Heart disease in her brother and mother; Prostate cancer in her father; Stroke in her mother; Substance Abuse in her mother  ,   reports that she has never smoked  She has never used smokeless tobacco  She reports that she does not drink alcohol or use drugs  ,  is allergic to aspirin and procaine     Current Outpatient Medications   Medication Sig Dispense Refill    Bioflavonoid Products (C COMPLEX PO) Take 1 tablet by mouth daily      Calcium Carbonate-Vitamin D (CALCIUM-CARB 600 + D) 600-125 MG-UNIT TABS Take 1 tablet by mouth 2 (two) times a day      Coenzyme Q10 (CO Q-10) 200 MG CAPS Take by mouth      Cranberry 425 MG CAPS Take by mouth      docusate sodium (STOOL SOFTENER) 100 mg capsule Take 1 capsule by mouth      Evening Primrose Oil 1000 MG CAPS Take by mouth      Garlic 5268 MG CAPS Take by mouth      Lutein 40 MG CAPS Take by mouth      metoprolol succinate (TOPROL-XL) 50 mg 24 hr tablet TAKE 1 TABLET DAILY 90 tablet 1    Multiple Vitamin (MULTI-VITAMIN DAILY PO) Take by mouth daily      Omega-3 1000 MG CAPS Take 2 capsules by mouth 2 (two) times a day      Turmeric (CURCUMIN 95) 500 MG CAPS Take 1 capsule by mouth daily      Artificial Tear Ointment (DRY EYES OP) Apply to eye       No current facility-administered medications for this visit  Review of Systems   Constitutional: Negative for activity change, chills, diaphoresis, fatigue and fever  HENT: Negative  Eyes: Negative for visual disturbance  Respiratory: Negative for cough, chest tightness, shortness of breath and wheezing  Cardiovascular: Negative for chest pain, palpitations and leg swelling  Gastrointestinal: Negative for abdominal pain, constipation, diarrhea, nausea and vomiting  Endocrine: Negative for cold intolerance and heat intolerance  Genitourinary: Negative for difficulty urinating, dysuria and frequency  Musculoskeletal: Negative for arthralgias, gait problem and myalgias  Neurological: Negative for dizziness, seizures, syncope, weakness, light-headedness and headaches  Psychiatric/Behavioral: Negative for confusion, dysphoric mood and sleep disturbance  The patient is not nervous/anxious  PHQ-9 Depression Screening    PHQ-9:   Frequency of the following problems over the past two weeks:      Little interest or pleasure in doing things: 0 - not at all  Feeling down, depressed, or hopeless: 0 - not at all  Trouble falling or staying asleep, or sleeping too much: 0 - not at all  Feeling tired or having little energy: 0 - not at all  Poor appetite or overeatin - not at all  Feeling bad about yourself - or that you are a failure or have let yourself or your family down: 0 - not at all  Trouble concentrating on things, such as reading the newspaper or watching television: 0 - not at all  Moving or speaking so slowly that other people could have noticed  Or the opposite - being so fidgety or restless that you have been moving around a lot more than usual: 0 - not at all  Thoughts that you would be better off dead, or of hurting yourself in some way: 0 - not at all  PHQ-2 Score: 0  PHQ-9 Score: 0        Objective:  Vitals:    21 0906   BP: 122/80   Pulse: (!) 50   Temp: 98 5 °F (36 9 °C)   SpO2: 95%   Weight: 56 8 kg (125 lb 5 oz)   Height: 5' 2" (1 575 m)     Body mass index is 22 92 kg/m²  Physical Exam  Vitals signs and nursing note reviewed  Constitutional:       General: She is not in acute distress  Appearance: Normal appearance  She is not ill-appearing  HENT:      Head: Normocephalic and atraumatic  Mouth/Throat:      Mouth: Mucous membranes are moist    Eyes:      Extraocular Movements: Extraocular movements intact  Conjunctiva/sclera: Conjunctivae normal       Pupils: Pupils are equal, round, and reactive to light  Neck:      Musculoskeletal: Neck supple  Vascular: No carotid bruit  Cardiovascular:      Rate and Rhythm: Normal rate and regular rhythm  Heart sounds: Normal heart sounds  Pulmonary:      Effort: Pulmonary effort is normal  No respiratory distress  Breath sounds: Normal breath sounds  No wheezing or rales  Abdominal:      General: Bowel sounds are normal  There is no distension  Palpations: Abdomen is soft  Tenderness: There is no abdominal tenderness  Musculoskeletal:      Right lower leg: No edema  Left lower leg: No edema  Neurological:      General: No focal deficit present  Mental Status: She is alert and oriented to person, place, and time  Mental status is at baseline  Cranial Nerves: No cranial nerve deficit  Motor: No weakness  Psychiatric:         Mood and Affect: Mood normal          Behavior: Behavior normal          Thought Content:  Thought content normal          Judgment: Judgment normal

## 2021-01-19 NOTE — PROGRESS NOTES
Assessment and Plan:     Problem List Items Addressed This Visit        Cardiovascular and Mediastinum    Benign essential hypertension - Primary    Relevant Orders    Comprehensive metabolic panel       Musculoskeletal and Integument    Generalized osteoarthritis    Osteopenia       Other    Generalized anxiety disorder    Hyperlipidemia    Relevant Orders    LDL cholesterol, direct    Triglycerides    Mild major depression, single episode ([address] )      Other Visit Diagnoses     Medicare annual wellness visit, subsequent               Preventive health issues were discussed with patient, and age appropriate screening tests were ordered as noted in patient's After Visit Summary  Personalized health advice and appropriate referrals for health education or preventive services given if needed, as noted in patient's After Visit Summary       History of Present Illness:     Patient presents for Medicare Annual Wellness visit    Patient Care Team:  Gretel Emery DO as PCP - General (Internal Medicine)     Problem List:     Patient Active Problem List   Diagnosis    Allergic rhinitis    Benign essential hypertension    Dyspareunia, female    Generalized anxiety disorder    Generalized osteoarthritis    Hyperlipidemia    Osteopenia    Palpitations    Vaginal atrophy    Mild major depression, single episode ([facility] [address] )      Past Medical and Surgical History:     Past Medical History:   Diagnosis Date    Arthritis     Generalized anxiety disorder     Hypertension     Vaginal prolapse     last assessed: 11/13/2017     Past Surgical History:   Procedure Laterality Date    BILATERAL OOPHORECTOMY      last assessed: 11/13/2017    BLADDER EXSTROPHY CLOSURE      Repair of      BONY PELVIS SURGERY      Posterior Colporrhaphy (for pelvic Relaxation) last assessed: 11/13/2017    CATARACT EXTRACTION      COLONOSCOPY      last assessed: 11/13/2017    EYE SURGERY      HYSTERECTOMY      INCONTINENCE SURGERY Removal/Revision of sling for stress incontinence         Family History:     Family History   Problem Relation Age of Onset    Arthritis Mother     Heart disease Mother    Daisy Slipper Stroke Mother     Depression Mother     Substance Abuse Mother     Diabetes Father     Prostate cancer Father     Heart disease Brother     Diabetes Family       Social History:     E-Cigarette/Vaping    E-Cigarette Use Never User      E-Cigarette/Vaping Substances    Nicotine No     THC No     CBD No     Flavoring No     Other No     Unknown No      Social History     Socioeconomic History    Marital status: /Civil Union     Spouse name: None    Number of children: 3    Years of education: None    Highest education level: None   Occupational History    Occupation: retired     Comment: retired   Social Needs    Financial resource strain: None    Food insecurity     Worry: None     Inability: None    Transportation needs     Medical: None     Non-medical: None   Tobacco Use    Smoking status: Never Smoker    Smokeless tobacco: Never Used   Substance and Sexual Activity    Alcohol use: No    Drug use: No    Sexual activity: None   Lifestyle    Physical activity     Days per week: None     Minutes per session: None    Stress: None   Relationships    Social connections     Talks on phone: None     Gets together: None     Attends Judaism service: None     Active member of club or organization: None     Attends meetings of clubs or organizations: None     Relationship status: None    Intimate partner violence     Fear of current or ex partner: None     Emotionally abused: None     Physically abused: None     Forced sexual activity: None   Other Topics Concern    None   Social History Narrative    Lives with       Medications and Allergies:     Current Outpatient Medications   Medication Sig Dispense Refill    Bioflavonoid Products (C COMPLEX PO) Take 1 tablet by mouth daily      Calcium Carbonate-Vitamin D (CALCIUM-CARB 600 + D) 600-125 MG-UNIT TABS Take 1 tablet by mouth 2 (two) times a day      Coenzyme Q10 (CO Q-10) 200 MG CAPS Take by mouth      Cranberry 425 MG CAPS Take by mouth      docusate sodium (STOOL SOFTENER) 100 mg capsule Take 1 capsule by mouth      Evening Primrose Oil 1000 MG CAPS Take by mouth      Garlic 9095 MG CAPS Take by mouth      Lutein 40 MG CAPS Take by mouth      metoprolol succinate (TOPROL-XL) 50 mg 24 hr tablet TAKE 1 TABLET DAILY 90 tablet 1    Multiple Vitamin (MULTI-VITAMIN DAILY PO) Take by mouth daily      Omega-3 1000 MG CAPS Take 2 capsules by mouth 2 (two) times a day      Turmeric (CURCUMIN 95) 500 MG CAPS Take 1 capsule by mouth daily      Artificial Tear Ointment (DRY EYES OP) Apply to eye       No current facility-administered medications for this visit  Allergies   Allergen Reactions    Aspirin     Procaine       Immunizations:     Immunization History   Administered Date(s) Administered    Influenza, high dose seasonal 0 7 mL 12/02/2019, 10/13/2020    Pneumococcal 01/24/2012    Pneumococcal Conjugate 13-Valent 11/13/2017    Pneumococcal Conjugate PCV 7 01/24/2012      Health Maintenance: There are no preventive care reminders to display for this patient  Topic Date Due    DTaP,Tdap,and Td Vaccines (1 - Tdap) 07/12/1961    Pneumococcal Vaccine: 65+ Years (2 of 2 - PPSV23) 11/13/2018      Medicare Health Risk Assessment:     /80   Pulse (!) 50   Temp 98 5 °F (36 9 °C)   Ht 5' 2" (1 575 m)   Wt 56 8 kg (125 lb 5 oz)   SpO2 95%   BMI 22 92 kg/m²      Zari Hart is here for her Subsequent Wellness visit  Last Medicare Wellness visit information reviewed, patient interviewed and updates made to the record today  Health Risk Assessment:   Patient rates overall health as good  Patient feels that their physical health rating is same  Eyesight was rated as same  Hearing was rated as same   Patient feels that their emotional and mental health rating is same  Pain experienced in the last 7 days has been none  Patient states that she has experienced no weight loss or gain in last 6 months  Depression Screening:   PHQ-2 Score: 0  PHQ-9 Score: 0      Fall Risk Screening: In the past year, patient has experienced: no history of falling in past year      Urinary Incontinence Screening:   Patient has not leaked urine accidently in the last six months  Home Safety:  Patient does not have trouble with stairs inside or outside of their home  Patient has working smoke alarms and has working carbon monoxide detector  Home safety hazards include: none  Nutrition:   Current diet is Regular  Medications:   Patient is currently taking over-the-counter supplements  OTC medications include: see medication list  Patient is able to manage medications  Activities of Daily Living (ADLs)/Instrumental Activities of Daily Living (IADLs):   Walk and transfer into and out of bed and chair?: Yes  Dress and groom yourself?: Yes    Bathe or shower yourself?: Yes    Feed yourself? Yes  Do your laundry/housekeeping?: Yes  Manage your money, pay your bills and track your expenses?: Yes  Make your own meals?: Yes    Do your own shopping?: Yes    Previous Hospitalizations:   Any hospitalizations or ED visits within the last 12 months?: No      Advance Care Planning:   Living will: Yes    Durable POA for healthcare:  Yes    Advanced directive: Yes    Advanced directive counseling given: Yes    Five wishes given: No    Patient declined ACP directive: No    End of Life Decisions reviewed with patient: Yes    Provider agrees with end of life decisions: Yes      Cognitive Screening:   Provider or family/friend/caregiver concerned regarding cognition?: No    PREVENTIVE SCREENINGS      Cardiovascular Screening:    General: Screening Not Indicated and History Lipid Disorder    Due for: Lipid Panel      Diabetes Screening:     General: Screening Current    Due for: Blood Glucose      Colorectal Cancer Screening:     General: Patient Declines      Breast Cancer Screening:     General: Patient Declines    Due for: Mammogram        Cervical Cancer Screening:    General: Screening Not Indicated      Osteoporosis Screening:      Due for: DXA Appendicular      Lung Cancer Screening:     General: Screening Not Indicated      Hepatitis C Screening:    General: Screening Not Indicated    Other Counseling Topics:   Car/seat belt/driving safety and calcium and vitamin D intake and regular weightbearing exercise  A/P: Doing well and no falls reported  Denies depression and feels safe at home  Diverse diet  No problems operating a MV and uses seat belts  Has a living will and POA  No DME or referrals needed today  RTC one year for medicare wellness       Tima Walden DO

## 2021-01-25 ENCOUNTER — IMMUNIZATIONS (OUTPATIENT)
Dept: FAMILY MEDICINE CLINIC | Facility: HOSPITAL | Age: 81
End: 2021-01-25

## 2021-01-25 DIAGNOSIS — Z23 ENCOUNTER FOR IMMUNIZATION: Primary | ICD-10-CM

## 2021-01-25 PROCEDURE — 0011A SARS-COV-2 / COVID-19 MRNA VACCINE (MODERNA) 100 MCG: CPT

## 2021-01-25 PROCEDURE — 91301 SARS-COV-2 / COVID-19 MRNA VACCINE (MODERNA) 100 MCG: CPT

## 2021-02-22 ENCOUNTER — IMMUNIZATIONS (OUTPATIENT)
Dept: FAMILY MEDICINE CLINIC | Facility: HOSPITAL | Age: 81
End: 2021-02-22

## 2021-02-22 DIAGNOSIS — Z23 ENCOUNTER FOR IMMUNIZATION: Primary | ICD-10-CM

## 2021-02-22 PROCEDURE — 0012A SARS-COV-2 / COVID-19 MRNA VACCINE (MODERNA) 100 MCG: CPT

## 2021-02-22 PROCEDURE — 91301 SARS-COV-2 / COVID-19 MRNA VACCINE (MODERNA) 100 MCG: CPT

## 2021-05-03 ENCOUNTER — HOSPITAL ENCOUNTER (OUTPATIENT)
Dept: MAMMOGRAPHY | Facility: HOSPITAL | Age: 81
Discharge: HOME | End: 2021-05-03
Attending: INTERNAL MEDICINE
Payer: MEDICARE

## 2021-05-03 ENCOUNTER — HOSPITAL ENCOUNTER (OUTPATIENT)
Dept: BONE DENSITY | Facility: HOSPITAL | Age: 81
Discharge: HOME | End: 2021-05-03
Attending: INTERNAL MEDICINE
Payer: MEDICARE

## 2021-05-03 VITALS — BODY MASS INDEX: 23.74 KG/M2 | WEIGHT: 129 LBS | HEIGHT: 62 IN

## 2021-05-03 DIAGNOSIS — Z12.31 ENCOUNTER FOR SCREENING MAMMOGRAM FOR MALIGNANT NEOPLASM OF BREAST: ICD-10-CM

## 2021-05-03 DIAGNOSIS — E28.39 MENOPAUSE OVARIAN FAILURE: ICD-10-CM

## 2021-05-03 PROCEDURE — 77067 SCR MAMMO BI INCL CAD: CPT

## 2021-05-03 PROCEDURE — 77080 DXA BONE DENSITY AXIAL: CPT

## 2021-05-03 PROCEDURE — 77063 BREAST TOMOSYNTHESIS BI: CPT

## 2021-05-27 ENCOUNTER — OFFICE VISIT (OUTPATIENT)
Dept: INTERNAL MEDICINE CLINIC | Facility: CLINIC | Age: 81
End: 2021-05-27
Payer: MEDICARE

## 2021-05-27 VITALS
OXYGEN SATURATION: 99 % | DIASTOLIC BLOOD PRESSURE: 76 MMHG | HEART RATE: 65 BPM | BODY MASS INDEX: 23.37 KG/M2 | WEIGHT: 127 LBS | SYSTOLIC BLOOD PRESSURE: 112 MMHG | HEIGHT: 62 IN | TEMPERATURE: 96 F

## 2021-05-27 DIAGNOSIS — F32.0 MILD MAJOR DEPRESSION, SINGLE EPISODE (HCC): ICD-10-CM

## 2021-05-27 DIAGNOSIS — M15.9 GENERALIZED OSTEOARTHRITIS: ICD-10-CM

## 2021-05-27 DIAGNOSIS — I10 BENIGN ESSENTIAL HYPERTENSION: Primary | ICD-10-CM

## 2021-05-27 DIAGNOSIS — F41.1 GENERALIZED ANXIETY DISORDER: ICD-10-CM

## 2021-05-27 DIAGNOSIS — E78.2 MIXED HYPERLIPIDEMIA: ICD-10-CM

## 2021-05-27 DIAGNOSIS — M85.80 OSTEOPENIA, UNSPECIFIED LOCATION: ICD-10-CM

## 2021-05-27 PROCEDURE — 99214 OFFICE O/P EST MOD 30 MIN: CPT | Performed by: INTERNAL MEDICINE

## 2021-05-27 NOTE — PATIENT INSTRUCTIONS

## 2021-06-04 DIAGNOSIS — F41.1 GENERALIZED ANXIETY DISORDER: ICD-10-CM

## 2021-06-04 DIAGNOSIS — R00.2 PALPITATIONS: ICD-10-CM

## 2021-06-04 DIAGNOSIS — I10 BENIGN ESSENTIAL HYPERTENSION: ICD-10-CM

## 2021-06-04 RX ORDER — METOPROLOL SUCCINATE 50 MG/1
TABLET, EXTENDED RELEASE ORAL
Qty: 90 TABLET | Refills: 1 | Status: SHIPPED | OUTPATIENT
Start: 2021-06-04 | End: 2021-12-01

## 2021-09-17 DIAGNOSIS — E78.2 MIXED HYPERLIPIDEMIA: ICD-10-CM

## 2021-09-17 DIAGNOSIS — I10 BENIGN ESSENTIAL HYPERTENSION: Primary | ICD-10-CM

## 2021-09-17 DIAGNOSIS — F41.8 ANXIETY WITH DEPRESSION: ICD-10-CM

## 2021-09-28 ENCOUNTER — OFFICE VISIT (OUTPATIENT)
Dept: INTERNAL MEDICINE CLINIC | Facility: CLINIC | Age: 81
End: 2021-09-28
Payer: MEDICARE

## 2021-09-28 ENCOUNTER — APPOINTMENT (OUTPATIENT)
Dept: LAB | Facility: CLINIC | Age: 81
End: 2021-09-28
Payer: MEDICARE

## 2021-09-28 VITALS
DIASTOLIC BLOOD PRESSURE: 60 MMHG | TEMPERATURE: 98.1 F | WEIGHT: 121.38 LBS | HEART RATE: 58 BPM | HEIGHT: 62 IN | BODY MASS INDEX: 22.34 KG/M2 | OXYGEN SATURATION: 97 % | SYSTOLIC BLOOD PRESSURE: 112 MMHG

## 2021-09-28 DIAGNOSIS — M85.80 OSTEOPENIA, UNSPECIFIED LOCATION: ICD-10-CM

## 2021-09-28 DIAGNOSIS — Z11.59 NEED FOR HEPATITIS C SCREENING TEST: ICD-10-CM

## 2021-09-28 DIAGNOSIS — I10 BENIGN ESSENTIAL HYPERTENSION: ICD-10-CM

## 2021-09-28 DIAGNOSIS — M15.9 GENERALIZED OSTEOARTHRITIS: ICD-10-CM

## 2021-09-28 DIAGNOSIS — F32.0 MILD MAJOR DEPRESSION, SINGLE EPISODE (HCC): ICD-10-CM

## 2021-09-28 DIAGNOSIS — Z23 ENCOUNTER FOR VACCINATION: ICD-10-CM

## 2021-09-28 DIAGNOSIS — I10 BENIGN ESSENTIAL HYPERTENSION: Primary | ICD-10-CM

## 2021-09-28 DIAGNOSIS — E78.2 MIXED HYPERLIPIDEMIA: ICD-10-CM

## 2021-09-28 DIAGNOSIS — F41.8 ANXIETY WITH DEPRESSION: ICD-10-CM

## 2021-09-28 DIAGNOSIS — F41.1 GENERALIZED ANXIETY DISORDER: ICD-10-CM

## 2021-09-28 LAB
ALBUMIN SERPL BCP-MCNC: 3.8 G/DL (ref 3.5–5)
ALP SERPL-CCNC: 76 U/L (ref 46–116)
ALT SERPL W P-5'-P-CCNC: 36 U/L (ref 12–78)
ANION GAP SERPL CALCULATED.3IONS-SCNC: 4 MMOL/L (ref 4–13)
AST SERPL W P-5'-P-CCNC: 23 U/L (ref 5–45)
BASOPHILS # BLD AUTO: 0.06 THOUSANDS/ΜL (ref 0–0.1)
BASOPHILS NFR BLD AUTO: 1 % (ref 0–1)
BILIRUB SERPL-MCNC: 0.44 MG/DL (ref 0.2–1)
BUN SERPL-MCNC: 13 MG/DL (ref 5–25)
CALCIUM SERPL-MCNC: 9.3 MG/DL (ref 8.3–10.1)
CHLORIDE SERPL-SCNC: 102 MMOL/L (ref 100–108)
CHOLEST SERPL-MCNC: 190 MG/DL (ref 50–200)
CO2 SERPL-SCNC: 30 MMOL/L (ref 21–32)
CREAT SERPL-MCNC: 0.75 MG/DL (ref 0.6–1.3)
CREAT UR-MCNC: 55.3 MG/DL
EOSINOPHIL # BLD AUTO: 0.11 THOUSAND/ΜL (ref 0–0.61)
EOSINOPHIL NFR BLD AUTO: 2 % (ref 0–6)
ERYTHROCYTE [DISTWIDTH] IN BLOOD BY AUTOMATED COUNT: 12.4 % (ref 11.6–15.1)
GFR SERPL CREATININE-BSD FRML MDRD: 76 ML/MIN/1.73SQ M
GLUCOSE P FAST SERPL-MCNC: 93 MG/DL (ref 65–99)
HCT VFR BLD AUTO: 41.4 % (ref 34.8–46.1)
HCV AB SER QL: NORMAL
HDLC SERPL-MCNC: 57 MG/DL
HGB BLD-MCNC: 14.1 G/DL (ref 11.5–15.4)
IMM GRANULOCYTES # BLD AUTO: 0.02 THOUSAND/UL (ref 0–0.2)
IMM GRANULOCYTES NFR BLD AUTO: 0 % (ref 0–2)
LDLC SERPL CALC-MCNC: 115 MG/DL (ref 0–100)
LYMPHOCYTES # BLD AUTO: 1.4 THOUSANDS/ΜL (ref 0.6–4.47)
LYMPHOCYTES NFR BLD AUTO: 21 % (ref 14–44)
MCH RBC QN AUTO: 30.3 PG (ref 26.8–34.3)
MCHC RBC AUTO-ENTMCNC: 34.1 G/DL (ref 31.4–37.4)
MCV RBC AUTO: 89 FL (ref 82–98)
MICROALBUMIN UR-MCNC: 6 MG/L (ref 0–20)
MICROALBUMIN/CREAT 24H UR: 11 MG/G CREATININE (ref 0–30)
MONOCYTES # BLD AUTO: 0.49 THOUSAND/ΜL (ref 0.17–1.22)
MONOCYTES NFR BLD AUTO: 7 % (ref 4–12)
NEUTROPHILS # BLD AUTO: 4.69 THOUSANDS/ΜL (ref 1.85–7.62)
NEUTS SEG NFR BLD AUTO: 69 % (ref 43–75)
NRBC BLD AUTO-RTO: 0 /100 WBCS
PLATELET # BLD AUTO: 288 THOUSANDS/UL (ref 149–390)
PMV BLD AUTO: 9.3 FL (ref 8.9–12.7)
POTASSIUM SERPL-SCNC: 4.1 MMOL/L (ref 3.5–5.3)
PROT SERPL-MCNC: 7.4 G/DL (ref 6.4–8.2)
RBC # BLD AUTO: 4.66 MILLION/UL (ref 3.81–5.12)
SODIUM SERPL-SCNC: 136 MMOL/L (ref 136–145)
TRIGL SERPL-MCNC: 89 MG/DL
TSH SERPL DL<=0.05 MIU/L-ACNC: 0.93 UIU/ML (ref 0.36–3.74)
WBC # BLD AUTO: 6.77 THOUSAND/UL (ref 4.31–10.16)

## 2021-09-28 PROCEDURE — 99214 OFFICE O/P EST MOD 30 MIN: CPT | Performed by: INTERNAL MEDICINE

## 2021-09-28 PROCEDURE — 86803 HEPATITIS C AB TEST: CPT

## 2021-09-28 PROCEDURE — 36415 COLL VENOUS BLD VENIPUNCTURE: CPT

## 2021-09-28 PROCEDURE — 82043 UR ALBUMIN QUANTITATIVE: CPT | Performed by: INTERNAL MEDICINE

## 2021-09-28 PROCEDURE — 82570 ASSAY OF URINE CREATININE: CPT | Performed by: INTERNAL MEDICINE

## 2021-09-28 PROCEDURE — 80053 COMPREHEN METABOLIC PANEL: CPT

## 2021-09-28 PROCEDURE — 84443 ASSAY THYROID STIM HORMONE: CPT

## 2021-09-28 PROCEDURE — 85025 COMPLETE CBC W/AUTO DIFF WBC: CPT

## 2021-09-28 PROCEDURE — 80061 LIPID PANEL: CPT

## 2021-09-28 NOTE — PATIENT INSTRUCTIONS
Chronic Hypertension   AMBULATORY CARE:   Hypertension  is high blood pressure  Your blood pressure is the force of your blood moving against the walls of your arteries  Hypertension causes your blood pressure to get so high that your heart has to work much harder than normal  This can damage your heart  Even if you have hypertension for years, lifestyle changes, medicines, or both can help bring your blood pressure to normal   Call your local emergency number (911 in the [address] [facility]) or have someone call if:   · You have chest pain  · You have any of the following signs of a heart attack:      ? Squeezing, pressure, or pain in your chest    ? You may  also have any of the following:     § Discomfort or pain in your back, neck, jaw, stomach, or arm    § Shortness of breath    § Nausea or vomiting    § Lightheadedness or a sudden cold sweat    · You become confused or have difficulty speaking  · You suddenly feel lightheaded or have trouble breathing  Seek care immediately if:   · You have a severe headache or vision loss  · You have weakness in an arm or leg  Call your doctor if:   · You feel faint, dizzy, confused, or drowsy  · You have been taking your blood pressure medicine but your pressure is higher than your provider says it should be  · You have questions or concerns about your condition or care  Treatment for chronic hypertension  may include medicine to lower your blood pressure and cholesterol levels  A low cholesterol level helps prevent heart disease and makes it easier to control your blood pressure  Heart disease can make your blood pressure harder to control  You may also need to make lifestyle changes  What you need to know about the stages of hypertension:       · Normal blood pressure is 119/79 or lower   Your healthcare provider may only check your blood pressure each year if it stays at a normal level  · Elevated blood pressure is 120/79 to 129/79    This is sometimes called prehypertension  Your healthcare provider may suggest lifestyle changes to help lower your blood pressure to a normal level  He or she may then check it again in 3 to 6 months  · Stage 1 hypertension is 130/80  to 139/89   Your provider may recommend lifestyle changes, medication, and checks every 3 to 6 months until your blood pressure is controlled  · Stage 2 hypertension is 140/90 or higher   Your provider will recommend lifestyle changes and have you take 2 kinds of hypertension medicines  You will also need to have your blood pressure checked monthly until it is controlled  Manage chronic hypertension:   · Check your blood pressure at home  Avoid smoking, caffeine, and exercise at least 30 minutes before checking your blood pressure  Sit and rest for 5 minutes before you take your blood pressure  Extend your arm and support it on a flat surface  Your arm should be at the same level as your heart  Follow the directions that came with your blood pressure monitor  Check your blood pressure 2 times, 1 minute apart, before you take your medicine in the morning  Also check your blood pressure before your evening meal  Keep a record of your readings and bring it to your follow-up visits  Ask your healthcare provider what your blood pressure should be  · Manage any other health conditions you have  Health conditions such as diabetes can increase your risk for hypertension  Follow your healthcare provider's instructions and take all your medicines as directed  Talk to your healthcare provider about any new health conditions you have recently developed  · Ask about all medicines  Certain medicines can increase your blood pressure  Examples include oral birth control pills, decongestants, herbal supplements, and NSAIDs, such as ibuprofen  Your healthcare provider can tell you which medicines are safe for you to take  This includes prescription and over-the-counter medicines      Lifestyle changes you can make to lower your blood pressure: Your provider may want you to make more lifestyle changes if you are having trouble controlling your blood pressure  This may feel difficult over time, especially if you think you are making good changes but your pressure is still high  It might help to focus on one new change at a time  For example, try to add 1 more day of exercise, or exercise for an extra 10 minutes on 2 days  Small changes can make a big difference  Your healthcare provider can also refer you to specialists such as a dietitian who can help you make small changes  Your family members may be included in helping you learn to create lifestyle changes, such as the following:  · Limit sodium (salt) as directed  Too much sodium can affect your fluid balance  Check labels to find low-sodium or no-salt-added foods  Some low-sodium foods use potassium salts for flavor  Too much potassium can also cause health problems  Your healthcare provider will tell you how much sodium and potassium are safe for you to have in a day  He or she may recommend that you limit sodium to 2,300 mg a day  · Follow the meal plan recommended by your healthcare provider  A dietitian or your provider can give you more information on low-sodium plans or the DASH (Dietary Approaches to Stop Hypertension) eating plan  The DASH plan is low in sodium, processed sugar, unhealthy fats, and total fat  It is high in potassium, calcium, and fiber  These can be found in vegetables, fruit, and whole-grain foods  · Be physically active throughout the day  Physical activity, such as exercise, can help control your blood pressure and your weight  Be physically active for at least 30 minutes per day, on most days of the week  Include aerobic activity, such as walking or riding a bicycle  Also include strength training at least 2 times each week  Your healthcare providers can help you create a physical activity plan  · Decrease stress  This may help lower your blood pressure  Learn ways to relax, such as deep breathing or listening to music  · Limit alcohol as directed  Alcohol can increase your blood pressure  A drink of alcohol is 12 ounces of beer, 5 ounces of wine, or 1½ ounces of liquor  · Do not smoke  Nicotine and other chemicals in cigarettes and cigars can increase your blood pressure and also cause lung damage  Ask your healthcare provider for information if you currently smoke and need help to quit  E-cigarettes or smokeless tobacco still contain nicotine  Talk to your healthcare provider before you use these products  Follow up with your doctor as directed: You will need to return to have your blood pressure checked and to have other lab tests done  Write down your questions so you remember to ask them during your visits  © Copyright [employer] 2021 Information is for End User's use only and may not be sold, redistributed or otherwise used for commercial purposes  All illustrations and images included in CareNotes® are the copyrighted property of A D A M [employer] Inc  or [facility]   The above information is an  only  It is not intended as medical advice for individual conditions or treatments  Talk to your doctor, nurse or pharmacist before following any medical regimen to see if it is safe and effective for you

## 2021-09-28 NOTE — PROGRESS NOTES
Assessment/Plan:  Problem List Items Addressed This Visit        Cardiovascular and Mediastinum    Benign essential hypertension - Primary       Musculoskeletal and Integument    Osteopenia    Generalized osteoarthritis       Other    Mild major depression, single episode ([facility] [address] )    Hyperlipidemia    Generalized anxiety disorder      Other Visit Diagnoses     Encounter for vaccination        Need for hepatitis C screening test        Relevant Orders    Hepatitis C antibody           Diagnoses and all orders for this visit:    Benign essential hypertension    Generalized osteoarthritis    Osteopenia, unspecified location    Mild major depression, single episode ([facility] [address] )    Generalized anxiety disorder    Mixed hyperlipidemia    Encounter for vaccination    Need for hepatitis C screening test  -     Hepatitis C antibody; Future        No problem-specific Assessment & Plan notes found for this encounter  A/P: Doing well and will check labs  Discussed vaccines and defers the flu and pneumonia  Continue current treatment and RTC four months for routine  Subjective:      Patient ID: Charli Galeano is a 80 y o  female  WF RTC for f/u htn, hyperlipidemia, etc  Doing well and no new issues  Remains active w/o difficulty and no falls  Chronic pain is controlled  FARZAD/MDD is manageable  Due for labs and vaccines  The following portions of the patient's history were reviewed and updated as appropriate:   She has a past medical history of Arthritis, Generalized anxiety disorder, Hypertension, and Vaginal prolapse ,  does not have any pertinent problems on file  ,   has a past surgical history that includes Colonoscopy; Hysterectomy; Bilateral oophorectomy; Bony pelvis surgery; Incontinence surgery; Bladder exstrophy closure; Eye surgery; and Cataract extraction  ,  family history includes Arthritis in her mother; Depression in her mother; Diabetes in her family and father; Heart disease in her brother and mother;  No Known Problems in her daughter, daughter, maternal aunt, maternal grandmother, paternal aunt, paternal aunt, and paternal grandmother; Prostate cancer in her father; Stroke in her mother; Substance Abuse in her mother  ,   reports that she has never smoked  She has never used smokeless tobacco  She reports that she does not drink alcohol and does not use drugs  ,  is allergic to aspirin and procaine     Current Outpatient Medications   Medication Sig Dispense Refill    Artificial Tear Ointment (DRY EYES OP) Apply to eye      Bioflavonoid Products (C COMPLEX PO) Take 1 tablet by mouth daily      Calcium Carbonate-Vitamin D (CALCIUM-CARB 600 + D) 600-125 MG-UNIT TABS Take 1 tablet by mouth 2 (two) times a day      Coenzyme Q10 (CO Q-10) 200 MG CAPS Take by mouth      Cranberry 425 MG CAPS Take by mouth      docusate sodium (STOOL SOFTENER) 100 mg capsule Take 1 capsule by mouth      Evening Primrose Oil 1000 MG CAPS Take by mouth      Garlic 7788 MG CAPS Take by mouth      Lutein 40 MG CAPS Take by mouth      metoprolol succinate (TOPROL-XL) 50 mg 24 hr tablet TAKE 1 TABLET DAILY 90 tablet 1    Multiple Vitamin (MULTI-VITAMIN DAILY PO) Take by mouth daily      Omega-3 1000 MG CAPS Take 2 capsules by mouth 2 (two) times a day      Turmeric (CURCUMIN 95) 500 MG CAPS Take 1 capsule by mouth daily       No current facility-administered medications for this visit  Review of Systems   Constitutional: Negative for activity change, chills, diaphoresis, fatigue and fever  HENT: Negative  Eyes: Negative for visual disturbance  Respiratory: Negative for cough, chest tightness, shortness of breath and wheezing  Cardiovascular: Negative for chest pain, palpitations and leg swelling  Gastrointestinal: Negative for abdominal pain, constipation, diarrhea, nausea and vomiting  Endocrine: Negative for cold intolerance and heat intolerance     Genitourinary: Negative for difficulty urinating, dysuria and frequency  Musculoskeletal: Negative for arthralgias, gait problem and myalgias  Neurological: Negative for light-headedness and headaches  Psychiatric/Behavioral: Negative for confusion, dysphoric mood and sleep disturbance  The patient is not nervous/anxious  PHQ-9 Depression Screening    PHQ-9:   Frequency of the following problems over the past two weeks:            Objective:  Vitals:    09/28/21 0812   BP: 112/60   Pulse: 58   Temp: 98 1 °F (36 7 °C)   SpO2: 97%   Weight: 55 1 kg (121 lb 6 oz)   Height: 5' 2" (1 575 m)     Body mass index is 22 2 kg/m²  Physical Exam  Vitals and nursing note reviewed  Constitutional:       General: She is not in acute distress  Appearance: Normal appearance  She is not ill-appearing  HENT:      Head: Normocephalic and atraumatic  Mouth/Throat:      Mouth: Mucous membranes are moist    Eyes:      Extraocular Movements: Extraocular movements intact  Conjunctiva/sclera: Conjunctivae normal       Pupils: Pupils are equal, round, and reactive to light  Neck:      Vascular: No carotid bruit  Cardiovascular:      Rate and Rhythm: Normal rate and regular rhythm  Heart sounds: Normal heart sounds  Pulmonary:      Effort: Pulmonary effort is normal  No respiratory distress  Breath sounds: Normal breath sounds  No wheezing or rales  Abdominal:      General: Bowel sounds are normal  There is no distension  Palpations: Abdomen is soft  Tenderness: There is no abdominal tenderness  Musculoskeletal:      Cervical back: Neck supple  Right lower leg: No edema  Left lower leg: No edema  Neurological:      General: No focal deficit present  Mental Status: She is alert and oriented to person, place, and time  Mental status is at baseline  Psychiatric:         Mood and Affect: Mood normal          Behavior: Behavior normal          Thought Content:  Thought content normal          Judgment: Judgment normal

## 2021-12-01 DIAGNOSIS — F41.1 GENERALIZED ANXIETY DISORDER: ICD-10-CM

## 2021-12-01 DIAGNOSIS — I10 BENIGN ESSENTIAL HYPERTENSION: ICD-10-CM

## 2021-12-01 DIAGNOSIS — R00.2 PALPITATIONS: ICD-10-CM

## 2021-12-01 RX ORDER — METOPROLOL SUCCINATE 50 MG/1
TABLET, EXTENDED RELEASE ORAL
Qty: 90 TABLET | Refills: 3 | Status: SHIPPED | OUTPATIENT
Start: 2021-12-01

## 2021-12-14 ENCOUNTER — IMMUNIZATIONS (OUTPATIENT)
Dept: FAMILY MEDICINE CLINIC | Facility: HOSPITAL | Age: 81
End: 2021-12-14

## 2021-12-14 DIAGNOSIS — Z23 ENCOUNTER FOR IMMUNIZATION: Primary | ICD-10-CM

## 2021-12-14 PROCEDURE — 0011A COVID-19 MODERNA VACC 0.5 ML: CPT

## 2021-12-14 PROCEDURE — 91301 COVID-19 MODERNA VACC 0.5 ML: CPT

## 2022-01-28 ENCOUNTER — OFFICE VISIT (OUTPATIENT)
Dept: INTERNAL MEDICINE CLINIC | Facility: CLINIC | Age: 82
End: 2022-01-28
Payer: MEDICARE

## 2022-01-28 ENCOUNTER — APPOINTMENT (OUTPATIENT)
Dept: LAB | Facility: CLINIC | Age: 82
End: 2022-01-28
Payer: MEDICARE

## 2022-01-28 VITALS
BODY MASS INDEX: 23.05 KG/M2 | SYSTOLIC BLOOD PRESSURE: 122 MMHG | TEMPERATURE: 97.9 F | OXYGEN SATURATION: 98 % | HEART RATE: 60 BPM | WEIGHT: 125.25 LBS | DIASTOLIC BLOOD PRESSURE: 60 MMHG | HEIGHT: 62 IN

## 2022-01-28 DIAGNOSIS — F41.1 GENERALIZED ANXIETY DISORDER: ICD-10-CM

## 2022-01-28 DIAGNOSIS — E78.2 MIXED HYPERLIPIDEMIA: ICD-10-CM

## 2022-01-28 DIAGNOSIS — Z00.00 MEDICARE ANNUAL WELLNESS VISIT, SUBSEQUENT: ICD-10-CM

## 2022-01-28 DIAGNOSIS — M85.80 OSTEOPENIA, UNSPECIFIED LOCATION: ICD-10-CM

## 2022-01-28 DIAGNOSIS — Z23 ENCOUNTER FOR VACCINATION: ICD-10-CM

## 2022-01-28 DIAGNOSIS — M15.9 GENERALIZED OSTEOARTHRITIS: ICD-10-CM

## 2022-01-28 DIAGNOSIS — I10 BENIGN ESSENTIAL HYPERTENSION: ICD-10-CM

## 2022-01-28 DIAGNOSIS — F32.0 MILD MAJOR DEPRESSION, SINGLE EPISODE (HCC): ICD-10-CM

## 2022-01-28 DIAGNOSIS — I10 BENIGN ESSENTIAL HYPERTENSION: Primary | ICD-10-CM

## 2022-01-28 DIAGNOSIS — J30.2 SEASONAL ALLERGIES: ICD-10-CM

## 2022-01-28 LAB
ALBUMIN SERPL BCP-MCNC: 4 G/DL (ref 3.5–5)
ALP SERPL-CCNC: 71 U/L (ref 46–116)
ALT SERPL W P-5'-P-CCNC: 30 U/L (ref 12–78)
ANION GAP SERPL CALCULATED.3IONS-SCNC: 6 MMOL/L (ref 4–13)
AST SERPL W P-5'-P-CCNC: 23 U/L (ref 5–45)
BILIRUB SERPL-MCNC: 0.59 MG/DL (ref 0.2–1)
BUN SERPL-MCNC: 12 MG/DL (ref 5–25)
CALCIUM SERPL-MCNC: 9.4 MG/DL (ref 8.3–10.1)
CHLORIDE SERPL-SCNC: 100 MMOL/L (ref 100–108)
CO2 SERPL-SCNC: 31 MMOL/L (ref 21–32)
CREAT SERPL-MCNC: 0.77 MG/DL (ref 0.6–1.3)
GFR SERPL CREATININE-BSD FRML MDRD: 73 ML/MIN/1.73SQ M
GLUCOSE P FAST SERPL-MCNC: 104 MG/DL (ref 65–99)
LDLC SERPL DIRECT ASSAY-MCNC: 110 MG/DL (ref 0–100)
POTASSIUM SERPL-SCNC: 4.2 MMOL/L (ref 3.5–5.3)
PROT SERPL-MCNC: 7.6 G/DL (ref 6.4–8.2)
SODIUM SERPL-SCNC: 137 MMOL/L (ref 136–145)
TRIGL SERPL-MCNC: 83 MG/DL

## 2022-01-28 PROCEDURE — G0439 PPPS, SUBSEQ VISIT: HCPCS | Performed by: INTERNAL MEDICINE

## 2022-01-28 PROCEDURE — 80053 COMPREHEN METABOLIC PANEL: CPT

## 2022-01-28 PROCEDURE — 99214 OFFICE O/P EST MOD 30 MIN: CPT | Performed by: INTERNAL MEDICINE

## 2022-01-28 PROCEDURE — 90662 IIV NO PRSV INCREASED AG IM: CPT | Performed by: INTERNAL MEDICINE

## 2022-01-28 PROCEDURE — G0008 ADMIN INFLUENZA VIRUS VAC: HCPCS | Performed by: INTERNAL MEDICINE

## 2022-01-28 PROCEDURE — 84478 ASSAY OF TRIGLYCERIDES: CPT

## 2022-01-28 PROCEDURE — 83721 ASSAY OF BLOOD LIPOPROTEIN: CPT

## 2022-01-28 PROCEDURE — 36415 COLL VENOUS BLD VENIPUNCTURE: CPT

## 2022-01-28 RX ORDER — MONTELUKAST SODIUM 10 MG/1
10 TABLET ORAL
Qty: 90 TABLET | Refills: 3 | Status: SHIPPED | OUTPATIENT
Start: 2022-01-28 | End: 2022-06-01

## 2022-01-28 NOTE — PROGRESS NOTES
Assessment/Plan:  Problem List Items Addressed This Visit        Cardiovascular and Mediastinum    Benign essential hypertension - Primary    Relevant Orders    Comprehensive metabolic panel    LDL cholesterol, direct    Triglycerides       Musculoskeletal and Integument    Osteopenia    Generalized osteoarthritis       Other    Mild major depression, single episode ([facility] )    Hyperlipidemia    Relevant Orders    Comprehensive metabolic panel    LDL cholesterol, direct    Triglycerides    Generalized anxiety disorder      Other Visit Diagnoses     Medicare annual wellness visit, subsequent        Encounter for vaccination        Relevant Orders    influenza vaccine, high-dose, PF 0 5 mL    Seasonal allergies        Relevant Medications    montelukast (SINGULAIR) 10 mg tablet           Diagnoses and all orders for this visit:    Benign essential hypertension  -     Comprehensive metabolic panel; Future  -     LDL cholesterol, direct; Future  -     Triglycerides; Future    Generalized osteoarthritis    Osteopenia, unspecified location    Mild major depression, single episode ([facility] )    Mixed hyperlipidemia  -     Comprehensive metabolic panel; Future  -     LDL cholesterol, direct; Future  -     Triglycerides; Future    Generalized anxiety disorder    Medicare annual wellness visit, subsequent    Encounter for vaccination  -     influenza vaccine, high-dose, PF 0 5 mL    Seasonal allergies  -     montelukast (SINGULAIR) 10 mg tablet; Take 1 tablet (10 mg total) by mouth daily at bedtime        No problem-specific Assessment & Plan notes found for this encounter  A/P: DOing well and will check labs  Discussed vaccines declines pneumo vaccine  Ordered flu last visit and we never gave it to her, ran out  Will get it to day  Will try Singulair for the allergies  Declines INS  Continue current treatment and RTC four months for routine  Subjective:      Patient ID: Regino Wong is a 80 y o  female      [facility] RTC for f/u htn, hyperlipidemia, etc  Doing well and no new issues,but ROS positive for allergies  Has tried OTC anti histamines w/o relief    Remains active w/o difficulty and no falls  CHronic pain is controlled  MDD/FARZAD is manageable  Due for labs and vaccines  The following portions of the patient's history were reviewed and updated as appropriate:   She has a past medical history of Arthritis, Generalized anxiety disorder, Hypertension, and Vaginal prolapse ,  does not have any pertinent problems on file  ,   has a past surgical history that includes Colonoscopy; Hysterectomy; Bilateral oophorectomy; Bony pelvis surgery; Incontinence surgery; Bladder exstrophy closure; Eye surgery; and Cataract extraction  ,  family history includes Arthritis in her mother; Depression in her mother; Diabetes in her family and father; Heart disease in her brother and mother; No Known Problems in her daughter, daughter, maternal aunt, maternal grandmother, paternal aunt, paternal aunt, and paternal grandmother; Prostate cancer in her father; Stroke in her mother; Substance Abuse in her mother  ,   reports that she has never smoked  She has never used smokeless tobacco  She reports that she does not drink alcohol and does not use drugs  ,  is allergic to aspirin and procaine     Current Outpatient Medications   Medication Sig Dispense Refill    Artificial Tear Ointment (DRY EYES OP) Apply to eye      Bioflavonoid Products (C COMPLEX PO) Take 1 tablet by mouth daily      Calcium Carbonate-Vitamin D (CALCIUM-CARB 600 + D) 600-125 MG-UNIT TABS Take 1 tablet by mouth 2 (two) times a day      Coenzyme Q10 (CO Q-10) 200 MG CAPS Take by mouth      Cranberry 425 MG CAPS Take by mouth      docusate sodium (STOOL SOFTENER) 100 mg capsule Take 1 capsule by mouth      Evening Primrose Oil 1000 MG CAPS Take by mouth      Garlic 0181 MG CAPS Take by mouth      Lutein 40 MG CAPS Take by mouth      metoprolol succinate (TOPROL-XL) 50 mg 24 hr tablet TAKE 1 TABLET DAILY 90 tablet 3    Multiple Vitamin (MULTI-VITAMIN DAILY PO) Take by mouth daily      Omega-3 1000 MG CAPS Take 2 capsules by mouth 2 (two) times a day      Turmeric (CURCUMIN 95) 500 MG CAPS Take 1 capsule by mouth daily      montelukast (SINGULAIR) 10 mg tablet Take 1 tablet (10 mg total) by mouth daily at bedtime 90 tablet 3     No current facility-administered medications for this visit  Review of Systems   Constitutional: Negative for activity change, chills, diaphoresis, fatigue and fever  HENT: Positive for congestion, postnasal drip and rhinorrhea  Negative for sinus pressure, sinus pain, sneezing and sore throat  Eyes: Negative for visual disturbance  Respiratory: Negative for cough, chest tightness, shortness of breath and wheezing  Cardiovascular: Negative for chest pain, palpitations and leg swelling  Gastrointestinal: Negative for abdominal pain, constipation, diarrhea, nausea and vomiting  Endocrine: Negative for cold intolerance and heat intolerance  Genitourinary: Negative for difficulty urinating, dysuria and frequency  Musculoskeletal: Negative for arthralgias, gait problem and myalgias  Neurological: Negative for dizziness, seizures, syncope, weakness, light-headedness and headaches  Psychiatric/Behavioral: Negative for confusion, dysphoric mood and sleep disturbance  The patient is not nervous/anxious          PHQ-2/9 Depression Screening    Little interest or pleasure in doing things: 0 - not at all  Feeling down, depressed, or hopeless: 0 - not at all  Trouble falling or staying asleep, or sleeping too much: 0 - not at all  Feeling tired or having little energy: 0 - not at all  Poor appetite or overeatin - not at all  Feeling bad about yourself - or that you are a failure or have let yourself or your family down: 0 - not at all  Trouble concentrating on things, such as reading the newspaper or watching television: 0 - not at all  Moving or speaking so slowly that other people could have noticed  Or the opposite - being so fidgety or restless that you have been moving around a lot more than usual: 0 - not at all  Thoughts that you would be better off dead, or of hurting yourself in some way: 0 - not at all  PHQ-9 Score: 0   PHQ-9 Interpretation: No or Minimal depression         Objective:  Vitals:    01/28/22 0940   BP: 122/60   Pulse: 60   Temp: 97 9 °F (36 6 °C)   SpO2: 98%   Weight: 56 8 kg (125 lb 4 oz)   Height: 5' 2" (1 575 m)     Body mass index is 22 91 kg/m²  Physical Exam  Vitals and nursing note reviewed  Constitutional:       General: She is not in acute distress  Appearance: Normal appearance  She is not ill-appearing  HENT:      Head: Normocephalic and atraumatic  Mouth/Throat:      Mouth: Mucous membranes are moist    Eyes:      Extraocular Movements: Extraocular movements intact  Conjunctiva/sclera: Conjunctivae normal       Pupils: Pupils are equal, round, and reactive to light  Neck:      Vascular: No carotid bruit  Cardiovascular:      Rate and Rhythm: Normal rate and regular rhythm  Heart sounds: Normal heart sounds  Pulmonary:      Effort: Pulmonary effort is normal  No respiratory distress  Breath sounds: Normal breath sounds  No wheezing or rales  Abdominal:      General: Bowel sounds are normal  There is no distension  Palpations: Abdomen is soft  Tenderness: There is no abdominal tenderness  Musculoskeletal:      Cervical back: Neck supple  Right lower leg: No edema  Left lower leg: No edema  Neurological:      General: No focal deficit present  Mental Status: She is alert and oriented to person, place, and time  Mental status is at baseline  Psychiatric:         Mood and Affect: Mood normal          Behavior: Behavior normal          Thought Content:  Thought content normal          Judgment: Judgment normal

## 2022-01-28 NOTE — PATIENT INSTRUCTIONS
Chronic Hypertension   AMBULATORY CARE:   Hypertension  is high blood pressure  Your blood pressure is the force of your blood moving against the walls of your arteries  Hypertension causes your blood pressure to get so high that your heart has to work much harder than normal  This can damage your heart  Even if you have hypertension for years, lifestyle changes, medicines, or both can help bring your blood pressure to normal   Call your local emergency number (911 in the [address] [facility]) or have someone call if:   · You have chest pain  · You have any of the following signs of a heart attack:      ? Squeezing, pressure, or pain in your chest    ? You may  also have any of the following:     § Discomfort or pain in your back, neck, jaw, stomach, or arm    § Shortness of breath    § Nausea or vomiting    § Lightheadedness or a sudden cold sweat    · You become confused or have difficulty speaking  · You suddenly feel lightheaded or have trouble breathing  Seek care immediately if:   · You have a severe headache or vision loss  · You have weakness in an arm or leg  Call your doctor or cardiologist if:   · You feel faint, dizzy, confused, or drowsy  · You have been taking your blood pressure medicine but your pressure is higher than your provider says it should be  · You have questions or concerns about your condition or care  Treatment for chronic hypertension  may include medicine to lower your blood pressure and cholesterol levels  A low cholesterol level helps prevent heart disease and makes it easier to control your blood pressure  Heart disease can make your blood pressure harder to control  You may also need to make lifestyle changes  What you need to know about the stages of hypertension:       · Normal blood pressure is 119/79 or lower   Your healthcare provider may only check your blood pressure each year if it stays at a normal level  · Elevated blood pressure is 120/79 to 129/79    This is sometimes called prehypertension  Your healthcare provider may suggest lifestyle changes to help lower your blood pressure to a normal level  He or she may then check it again in 3 to 6 months  · Stage 1 hypertension is 130/80  to 139/89   Your provider may recommend lifestyle changes, medication, and checks every 3 to 6 months until your blood pressure is controlled  · Stage 2 hypertension is 140/90 or higher   Your provider will recommend lifestyle changes and have you take 2 kinds of hypertension medicines  You will also need to have your blood pressure checked monthly until it is controlled  Manage chronic hypertension:   · Check your blood pressure at home  Avoid smoking, caffeine, and exercise at least 30 minutes before checking your blood pressure  Sit and rest for 5 minutes before you take your blood pressure  Extend your arm and support it on a flat surface  Your arm should be at the same level as your heart  Follow the directions that came with your blood pressure monitor  Check your blood pressure 2 times, 1 minute apart, before you take your medicine in the morning  Also check your blood pressure before your evening meal  Keep a record of your readings and bring it to your follow-up visits  Ask your healthcare provider what your blood pressure should be  · Manage any other health conditions you have  Health conditions such as diabetes can increase your risk for hypertension  Follow your healthcare provider's instructions and take all your medicines as directed  Talk to your healthcare provider about any new health conditions you have recently developed  · Ask about all medicines  Certain medicines can increase your blood pressure  Examples include oral birth control pills, decongestants, herbal supplements, and NSAIDs, such as ibuprofen  Your healthcare provider can tell you which medicines are safe for you to take   This includes prescription and over-the-counter medicines  Lifestyle changes you can make to lower your blood pressure: Your provider may want you to make more lifestyle changes if you are having trouble controlling your blood pressure  This may feel difficult over time, especially if you think you are making good changes but your pressure is still high  It might help to focus on one new change at a time  For example, try to add 1 more day of exercise, or exercise for an extra 10 minutes on 2 days  Small changes can make a big difference  Your healthcare provider can also refer you to specialists such as a dietitian who can help you make small changes  Your family members may be included in helping you learn to create lifestyle changes, such as the following:     · Limit sodium (salt) as directed  Too much sodium can affect your fluid balance  Check labels to find low-sodium or no-salt-added foods  Some low-sodium foods use potassium salts for flavor  Too much potassium can also cause health problems  Your healthcare provider will tell you how much sodium and potassium are safe for you to have in a day  He or she may recommend that you limit sodium to 2,300 mg a day  · Follow the meal plan recommended by your healthcare provider  A dietitian or your provider can give you more information on low-sodium plans or the DASH (Dietary Approaches to Stop Hypertension) eating plan  The DASH plan is low in sodium, processed sugar, unhealthy fats, and total fat  It is high in potassium, calcium, and fiber  These can be found in vegetables, fruit, and whole-grain foods  · Be physically active throughout the day  Physical activity, such as exercise, can help control your blood pressure and your weight  Be physically active for at least 30 minutes per day, on most days of the week  Include aerobic activity, such as walking or riding a bicycle  Also include strength training at least 2 times each week   Your healthcare providers can help you create a physical activity plan  · Decrease stress  This may help lower your blood pressure  Learn ways to relax, such as deep breathing or listening to music  · Limit alcohol as directed  Alcohol can increase your blood pressure  A drink of alcohol is 12 ounces of beer, 5 ounces of wine, or 1½ ounces of liquor  · Do not smoke  Nicotine and other chemicals in cigarettes and cigars can increase your blood pressure and also cause lung damage  Ask your healthcare provider for information if you currently smoke and need help to quit  E-cigarettes or smokeless tobacco still contain nicotine  Talk to your healthcare provider before you use these products  Follow up with your doctor or cardiologist as directed: You will need to return to have your blood pressure checked and to have other lab tests done  Write down your questions so you remember to ask them during your visits  © Copyright [employer] 2021 Information is for End User's use only and may not be sold, redistributed or otherwise used for commercial purposes  All illustrations and images included in CareNotes® are the copyrighted property of A [employer] A M , Inc  or [address] [place], [place] [address]   The above information is an  only  It is not intended as medical advice for individual conditions or treatments  Talk to your doctor, nurse or pharmacist before following any medical regimen to see if it is safe and effective for you  Medicare Preventive Visit Patient Instructions  Thank you for completing your Welcome to Medicare Visit or Medicare Annual Wellness Visit today  Your next wellness visit will be due in one year (1/29/2023)  The screening/preventive services that you may require over the next 5-10 years are detailed below  Some tests may not apply to you based off risk factors and/or age  Screening tests ordered at today's visit but not completed yet may show as past due   Also, please note that scanned in results may not display below   Preventive Screenings:  Service Recommendations Previous Testing/Comments   Colorectal Cancer Screening  * Colonoscopy    * Fecal Occult Blood Test (FOBT)/Fecal Immunochemical Test (FIT)  * Fecal DNA/Cologuard Test  * Flexible Sigmoidoscopy Age: 54-65 years old   Colonoscopy: every 10 years (may be performed more frequently if at higher risk)  OR  FOBT/FIT: every 1 year  OR  Cologuard: every 3 years  OR  Sigmoidoscopy: every 5 years  Screening may be recommended earlier than age 48 if at higher risk for colorectal cancer  Also, an individualized decision between you and your healthcare provider will decide whether screening between the ages of 74-80 would be appropriate  Colonoscopy: Not on file  FOBT/FIT: Not on file  Cologuard: Not on file  Sigmoidoscopy: Not on file          Breast Cancer Screening Age: 36 years old  Frequency: every 1-2 years  Not required if history of left and right mastectomy Mammogram: 05/03/2021    Screening Current   Cervical Cancer Screening Between the ages of 21-29, pap smear recommended once every 3 years  Between the ages of 33-67, can perform pap smear with HPV co-testing every 5 years  Recommendations may differ for women with a history of total hysterectomy, cervical cancer, or abnormal pap smears in past  Pap Smear: Not on file    Screening Not Indicated   Hepatitis C Screening Once for adults born between 1945 and 1965  More frequently in patients at high risk for Hepatitis C Hep C Antibody: 09/28/2021    Screening Current   Diabetes Screening 1-2 times per year if you're at risk for diabetes or have pre-diabetes Fasting glucose: 93 mg/dL   A1C: 5 3 %    Screening Current   Cholesterol Screening Once every 5 years if you don't have a lipid disorder  May order more often based on risk factors  Lipid panel: 09/28/2021    Screening Not Indicated  History Lipid Disorder     Other Preventive Screenings Covered by Medicare:  1   Abdominal Aortic Aneurysm (AAA) Screening: covered once if your at risk  You're considered to be at risk if you have a family history of AAA  2  Lung Cancer Screening: covers low dose CT scan once per year if you meet all of the following conditions: (1) Age 50-69; (2) No signs or symptoms of lung cancer; (3) Current smoker or have quit smoking within the last 15 years; (4) You have a tobacco smoking history of at least 30 pack years (packs per day multiplied by number of years you smoked); (5) You get a written order from a healthcare provider  3  Glaucoma Screening: covered annually if you're considered high risk: (1) You have diabetes OR (2) Family history of glaucoma OR (3)  aged 48 and older OR (3)  American aged 72 and older  3  Osteoporosis Screening: covered every 2 years if you meet one of the following conditions: (1) You're estrogen deficient and at risk for osteoporosis based off medical history and other findings; (2) Have a vertebral abnormality; (3) On glucocorticoid therapy for more than 3 months; (4) Have primary hyperparathyroidism; (5) On osteoporosis medications and need to assess response to drug therapy  · Last bone density test (DXA Scan): 05/03/2021   5  HIV Screening: covered annually if you're between the age of 15-65  Also covered annually if you are younger than 13 and older than 72 with risk factors for HIV infection  For pregnant patients, it is covered up to 3 times per pregnancy  Immunizations:  Immunization Recommendations   Influenza Vaccine Annual influenza vaccination during flu season is recommended for all persons aged >= 6 months who do not have contraindications   Pneumococcal Vaccine (Prevnar and Pneumovax)  * Prevnar = PCV13  * Pneumovax = PPSV23   Adults 25-60 years old: 1-3 doses may be recommended based on certain risk factors  Adults 72 years old: Prevnar (PCV13) vaccine recommended followed by Pneumovax (PPSV23) vaccine   If already received PPSV23 since turning 65, then PCV13 recommended at least one year after PPSV23 dose  Hepatitis B Vaccine 3 dose series if at intermediate or high risk (ex: diabetes, end stage renal disease, liver disease)   Tetanus (Td) Vaccine - COST NOT COVERED BY MEDICARE PART B Following completion of primary series, a booster dose should be given every 10 years to maintain immunity against tetanus  Td may also be given as tetanus wound prophylaxis  Tdap Vaccine - COST NOT COVERED BY MEDICARE PART B Recommended at least once for all adults  For pregnant patients, recommended with each pregnancy  Shingles Vaccine (Shingrix) - COST NOT COVERED BY MEDICARE PART B  2 shot series recommended in those aged 48 and above     Health Maintenance Due:      Topic Date Due    Hepatitis C Screening  Completed     Immunizations Due:      Topic Date Due    DTaP,Tdap,and Td Vaccines (1 - Tdap) Never done    Pneumococcal Vaccine: 65+ Years (2 of 2 - PPSV23) 11/13/2018    Influenza Vaccine (1) 09/01/2021     Advance Directives   What are advance directives? Advance directives are legal documents that state your wishes and plans for medical care  These plans are made ahead of time in case you lose your ability to make decisions for yourself  Advance directives can apply to any medical decision, such as the treatments you want, and if you want to donate organs  What are the types of advance directives? There are many types of advance directives, and each state has rules about how to use them  You may choose a combination of any of the following:  · Living will: This is a written record of the treatment you want  You can also choose which treatments you do not want, which to limit, and which to stop at a certain time  This includes surgery, medicine, IV fluid, and tube feedings  · Durable power of  for healthcare [facility]): This is a written record that states who you want to make healthcare choices for you when you are unable to make them for yourself   This person, called a proxy, is usually a family member or a friend  You may choose more than 1 proxy  · Do not resuscitate (DNR) order:  A DNR order is used in case your heart stops beating or you stop breathing  It is a request not to have certain forms of treatment, such as CPR  A DNR order may be included in other types of advance directives  · Medical directive: This covers the care that you want if you are in a coma, near death, or unable to make decisions for yourself  You can list the treatments you want for each condition  Treatment may include pain medicine, surgery, blood transfusions, dialysis, IV or tube feedings, and a ventilator (breathing machine)  · Values history: This document has questions about your views, beliefs, and how you feel and think about life  This information can help others choose the care that you would choose  Why are advance directives important? An advance directive helps you control your care  Although spoken wishes may be used, it is better to have your wishes written down  Spoken wishes can be misunderstood, or not followed  Treatments may be given even if you do not want them  An advance directive may make it easier for your family to make difficult choices about your care  © Copyright [employer] 2018 Information is for End User's use only and may not be sold, redistributed or otherwise used for commercial purposes  All illustrations and images included in CareNotes® are the copyrighted property of [employer]  or [facility] Preventive Visit Patient Instructions  Thank you for completing your Welcome to Medicare Visit or Medicare Annual Wellness Visit today  Your next wellness visit will be due in one year (1/29/2023)  The screening/preventive services that you may require over the next 5-10 years are detailed below  Some tests may not apply to you based off risk factors and/or age   Screening tests ordered at today's visit but not completed yet may show as past due  Also, please note that scanned in results may not display below  Preventive Screenings:  Service Recommendations Previous Testing/Comments   Colorectal Cancer Screening  * Colonoscopy    * Fecal Occult Blood Test (FOBT)/Fecal Immunochemical Test (FIT)  * Fecal DNA/Cologuard Test  * Flexible Sigmoidoscopy Age: 54-65 years old   Colonoscopy: every 10 years (may be performed more frequently if at higher risk)  OR  FOBT/FIT: every 1 year  OR  Cologuard: every 3 years  OR  Sigmoidoscopy: every 5 years  Screening may be recommended earlier than age 48 if at higher risk for colorectal cancer  Also, an individualized decision between you and your healthcare provider will decide whether screening between the ages of 74-80 would be appropriate  Colonoscopy: Not on file  FOBT/FIT: Not on file  Cologuard: Not on file  Sigmoidoscopy: Not on file          Breast Cancer Screening Age: 36 years old  Frequency: every 1-2 years  Not required if history of left and right mastectomy Mammogram: 05/03/2021    Screening Current   Cervical Cancer Screening Between the ages of 21-29, pap smear recommended once every 3 years  Between the ages of 33-67, can perform pap smear with HPV co-testing every 5 years  Recommendations may differ for women with a history of total hysterectomy, cervical cancer, or abnormal pap smears in past  Pap Smear: Not on file    Screening Not Indicated   Hepatitis C Screening Once for adults born between 1945 and 1965  More frequently in patients at high risk for Hepatitis C Hep C Antibody: 09/28/2021    Screening Current   Diabetes Screening 1-2 times per year if you're at risk for diabetes or have pre-diabetes Fasting glucose: 93 mg/dL   A1C: 5 3 %    Screening Current   Cholesterol Screening Once every 5 years if you don't have a lipid disorder  May order more often based on risk factors   Lipid panel: 09/28/2021    Screening Not Indicated  History Lipid Disorder     Other Preventive Screenings Covered by Medicare:  6  Abdominal Aortic Aneurysm (AAA) Screening: covered once if your at risk  You're considered to be at risk if you have a family history of AAA  7  Lung Cancer Screening: covers low dose CT scan once per year if you meet all of the following conditions: (1) Age 50-69; (2) No signs or symptoms of lung cancer; (3) Current smoker or have quit smoking within the last 15 years; (4) You have a tobacco smoking history of at least 30 pack years (packs per day multiplied by number of years you smoked); (5) You get a written order from a healthcare provider  8  Glaucoma Screening: covered annually if you're considered high risk: (1) You have diabetes OR (2) Family history of glaucoma OR (3)  aged 48 and older OR (3)  American aged 72 and older  5  Osteoporosis Screening: covered every 2 years if you meet one of the following conditions: (1) You're estrogen deficient and at risk for osteoporosis based off medical history and other findings; (2) Have a vertebral abnormality; (3) On glucocorticoid therapy for more than 3 months; (4) Have primary hyperparathyroidism; (5) On osteoporosis medications and need to assess response to drug therapy  · Last bone density test (DXA Scan): 05/03/2021  10  HIV Screening: covered annually if you're between the age of 12-76  Also covered annually if you are younger than 13 and older than 72 with risk factors for HIV infection  For pregnant patients, it is covered up to 3 times per pregnancy      Immunizations:  Immunization Recommendations   Influenza Vaccine Annual influenza vaccination during flu season is recommended for all persons aged >= 6 months who do not have contraindications   Pneumococcal Vaccine (Prevnar and Pneumovax)  * Prevnar = PCV13  * Pneumovax = PPSV23   Adults 25-60 years old: 1-3 doses may be recommended based on certain risk factors  Adults 72 years old: Prevnar (PCV13) vaccine recommended followed by Pneumovax (PPSV23) vaccine  If already received PPSV23 since turning 65, then PCV13 recommended at least one year after PPSV23 dose  Hepatitis B Vaccine 3 dose series if at intermediate or high risk (ex: diabetes, end stage renal disease, liver disease)   Tetanus (Td) Vaccine - COST NOT COVERED BY MEDICARE PART B Following completion of primary series, a booster dose should be given every 10 years to maintain immunity against tetanus  Td may also be given as tetanus wound prophylaxis  Tdap Vaccine - COST NOT COVERED BY MEDICARE PART B Recommended at least once for all adults  For pregnant patients, recommended with each pregnancy  Shingles Vaccine (Shingrix) - COST NOT COVERED BY MEDICARE PART B  2 shot series recommended in those aged 48 and above     Health Maintenance Due:      Topic Date Due    Hepatitis C Screening  Completed     Immunizations Due:      Topic Date Due    DTaP,Tdap,and Td Vaccines (1 - Tdap) Never done    Pneumococcal Vaccine: 65+ Years (2 of 2 - PPSV23) 11/13/2018    Influenza Vaccine (1) 09/01/2021     Advance Directives   What are advance directives? Advance directives are legal documents that state your wishes and plans for medical care  These plans are made ahead of time in case you lose your ability to make decisions for yourself  Advance directives can apply to any medical decision, such as the treatments you want, and if you want to donate organs  What are the types of advance directives? There are many types of advance directives, and each state has rules about how to use them  You may choose a combination of any of the following:  · Living will: This is a written record of the treatment you want  You can also choose which treatments you do not want, which to limit, and which to stop at a certain time  This includes surgery, medicine, IV fluid, and tube feedings  · Durable power of  for healthcare [place] [facility]):   This is a written record that states who you want to make healthcare choices for you when you are unable to make them for yourself  This person, called a proxy, is usually a family member or a friend  You may choose more than 1 proxy  · Do not resuscitate (DNR) order:  A DNR order is used in case your heart stops beating or you stop breathing  It is a request not to have certain forms of treatment, such as CPR  A DNR order may be included in other types of advance directives  · Medical directive: This covers the care that you want if you are in a coma, near death, or unable to make decisions for yourself  You can list the treatments you want for each condition  Treatment may include pain medicine, surgery, blood transfusions, dialysis, IV or tube feedings, and a ventilator (breathing machine)  · Values history: This document has questions about your views, beliefs, and how you feel and think about life  This information can help others choose the care that you would choose  Why are advance directives important? An advance directive helps you control your care  Although spoken wishes may be used, it is better to have your wishes written down  Spoken wishes can be misunderstood, or not followed  Treatments may be given even if you do not want them  An advance directive may make it easier for your family to make difficult choices about your care  © Copyright [employer] 2018 Information is for End User's use only and may not be sold, redistributed or otherwise used for commercial purposes   All illustrations and images included in CareNotes® are the copyrighted property of [employer]  or [address] [place] [address] [employer]

## 2022-01-28 NOTE — PROGRESS NOTES
Assessment and Plan:     Problem List Items Addressed This Visit        Cardiovascular and Mediastinum    Benign essential hypertension - Primary    Relevant Orders    Comprehensive metabolic panel    LDL cholesterol, direct    Triglycerides       Musculoskeletal and Integument    Osteopenia    Generalized osteoarthritis       Other    Mild major depression, single episode ([facility] [address] )    Hyperlipidemia    Relevant Orders    Comprehensive metabolic panel    LDL cholesterol, direct    Triglycerides    Generalized anxiety disorder      Other Visit Diagnoses     Medicare annual wellness visit, subsequent        Encounter for vaccination               Preventive health issues were discussed with patient, and age appropriate screening tests were ordered as noted in patient's After Visit Summary  Personalized health advice and appropriate referrals for health education or preventive services given if needed, as noted in patient's After Visit Summary       History of Present Illness:     Patient presents for Medicare Annual Wellness visit    Patient Care Team:  Brandon Valle DO as PCP - General (Internal Medicine)     Problem List:     Patient Active Problem List   Diagnosis    Allergic rhinitis    Benign essential hypertension    Dyspareunia, female    Generalized anxiety disorder    Generalized osteoarthritis    Hyperlipidemia    Osteopenia    Palpitations    Vaginal atrophy    Mild major depression, single episode ([facility] [address] )      Past Medical and Surgical History:     Past Medical History:   Diagnosis Date    Arthritis     Generalized anxiety disorder     Hypertension     Vaginal prolapse     last assessed: 11/13/2017     Past Surgical History:   Procedure Laterality Date    BILATERAL OOPHORECTOMY      last assessed: 11/13/2017    BLADDER EXSTROPHY CLOSURE      Repair of      BONY PELVIS SURGERY      Posterior Colporrhaphy (for pelvic Relaxation) last assessed: 11/13/2017    CATARACT EXTRACTION      COLONOSCOPY last assessed: 11/13/2017    EYE SURGERY      HYSTERECTOMY      INCONTINENCE SURGERY      Removal/Revision of sling for stress incontinence         Family History:     Family History   Problem Relation Age of Onset    Arthritis Mother     Heart disease Mother     Stroke Mother     Depression Mother     Substance Abuse Mother     Diabetes Father     Prostate cancer Father     Heart disease Brother     Diabetes Family     No Known Problems Daughter     No Known Problems Maternal Grandmother     No Known Problems Paternal Grandmother     No Known Problems Daughter     No Known Problems Maternal Aunt     No Known Problems Paternal Aunt     No Known Problems Paternal Aunt       Social History:     Social History     Socioeconomic History    Marital status: /Civil Union     Spouse name: None    Number of children: 3    Years of education: None    Highest education level: None   Occupational History    Occupation: retired     Comment: retired   Tobacco Use    Smoking status: Never Smoker    Smokeless tobacco: Never Used   Vaping Use    Vaping Use: Never used   Substance and Sexual Activity    Alcohol use: No    Drug use: No    Sexual activity: None   Other Topics Concern    None   Social History Narrative    Lives with      Social Determinants of Health     Financial Resource Strain: Not on file   Food Insecurity: Not on file   Transportation Needs: Not on file   Physical Activity: Not on file   Stress: Not on file   Social Connections: Not on file   Intimate Partner Violence: Not on file   Housing Stability: Not on file      Medications and Allergies:     Current Outpatient Medications   Medication Sig Dispense Refill    Artificial Tear Ointment (DRY EYES OP) Apply to eye      Bioflavonoid Products (C COMPLEX PO) Take 1 tablet by mouth daily      Calcium Carbonate-Vitamin D (CALCIUM-CARB 600 + D) 600-125 MG-UNIT TABS Take 1 tablet by mouth 2 (two) times a day      Coenzyme Q10 (CO Q-10) 200 MG CAPS Take by mouth      Cranberry 425 MG CAPS Take by mouth      docusate sodium (STOOL SOFTENER) 100 mg capsule Take 1 capsule by mouth      Evening Primrose Oil 1000 MG CAPS Take by mouth      Garlic 1048 MG CAPS Take by mouth      Lutein 40 MG CAPS Take by mouth      metoprolol succinate (TOPROL-XL) 50 mg 24 hr tablet TAKE 1 TABLET DAILY 90 tablet 3    Multiple Vitamin (MULTI-VITAMIN DAILY PO) Take by mouth daily      Omega-3 1000 MG CAPS Take 2 capsules by mouth 2 (two) times a day      Turmeric (CURCUMIN 95) 500 MG CAPS Take 1 capsule by mouth daily       No current facility-administered medications for this visit  Allergies   Allergen Reactions    Aspirin     Procaine       Immunizations:     Immunization History   Administered Date(s) Administered    COVID-19 MODERNA VACC 0 5 ML IM 01/25/2021, 02/22/2021, 12/14/2021    Influenza, high dose seasonal 0 7 mL 12/02/2019, 10/13/2020    Pneumococcal 01/24/2012    Pneumococcal Conjugate 13-Valent 11/13/2017    Pneumococcal Conjugate PCV 7 01/24/2012      Health Maintenance:         Topic Date Due    Hepatitis C Screening  Completed         Topic Date Due    DTaP,Tdap,and Td Vaccines (1 - Tdap) Never done    Pneumococcal Vaccine: 65+ Years (2 of 2 - PPSV23) 11/13/2018    Influenza Vaccine (1) 09/01/2021      Medicare Health Risk Assessment:     /60   Pulse 60   Temp 97 9 °F (36 6 °C)   Ht 5' 2" (1 575 m)   Wt 56 8 kg (125 lb 4 oz)   SpO2 98%   BMI 22 91 kg/m²      Newton Abdalla is here for her Subsequent Wellness visit  Last Medicare Wellness visit information reviewed, patient interviewed and updates made to the record today  Health Risk Assessment:   Patient rates overall health as good  Patient feels that their physical health rating is same  Patient is satisfied with their life  Eyesight was rated as same  Hearing was rated as same  Patient feels that their emotional and mental health rating is same  Patients states they are never, rarely angry  Patient states they are never, rarely unusually tired/fatigued  Pain experienced in the last 7 days has been none  Patient states that she has experienced no weight loss or gain in last 6 months  Depression Screening:   PHQ-9 Score: 0      Fall Risk Screening: In the past year, patient has experienced: no history of falling in past year      Urinary Incontinence Screening:   Patient has not leaked urine accidently in the last six months  Home Safety:  Patient does not have trouble with stairs inside or outside of their home  Patient has working smoke alarms and has working carbon monoxide detector  Home safety hazards include: none  Nutrition:   Current diet is Regular  Medications:   Patient is currently taking over-the-counter supplements  OTC medications include: see medication list  Patient is able to manage medications  Activities of Daily Living (ADLs)/Instrumental Activities of Daily Living (IADLs):   Walk and transfer into and out of bed and chair?: Yes  Dress and groom yourself?: Yes    Bathe or shower yourself?: Yes    Feed yourself? Yes  Do your laundry/housekeeping?: Yes  Manage your money, pay your bills and track your expenses?: Yes  Make your own meals?: Yes    Do your own shopping?: Yes    Previous Hospitalizations:   Any hospitalizations or ED visits within the last 12 months?: No      Advance Care Planning:   Living will: Yes    Durable POA for healthcare:  Yes    Advanced directive: Yes    Advanced directive counseling given: Yes    Five wishes given: No    Patient declined ACP directive: No    End of Life Decisions reviewed with patient: Yes    Provider agrees with end of life decisions: Yes      Cognitive Screening:   Provider or family/friend/caregiver concerned regarding cognition?: No    PREVENTIVE SCREENINGS      Cardiovascular Screening:    General: Screening Not Indicated and History Lipid Disorder    Due for: Lipid Panel      Diabetes Screening:     General: Screening Current    Due for: Blood Glucose      Colorectal Cancer Screening:     General: Risks and Benefits Discussed and Patient Declines      Breast Cancer Screening:     General: Screening Current      Cervical Cancer Screening:    General: Screening Not Indicated      Osteoporosis Screening:    General: Screening Current      Abdominal Aortic Aneurysm (AAA) Screening:        General: Screening Not Indicated      Lung Cancer Screening:     General: Screening Not Indicated      Hepatitis C Screening:    General: Screening Current    Screening, Brief Intervention, and Referral to Treatment (SBIRT)    Screening  Typical number of drinks in a day: 0  Typical number of drinks in a week: 0  Interpretation: Low risk drinking behavior  Single Item Drug Screening:  How often have you used an illegal drug (including marijuana) or a prescription medication for non-medical reasons in the past year? never    Single Item Drug Screen Score: 0  Interpretation: Negative screen for possible drug use disorder    Other Counseling Topics:   Car/seat belt/driving safety, sunscreen and calcium and vitamin D intake and regular weightbearing exercise  A/P: Doing well and no falls reported  Denies depression and feels safe at home  Diverse diet  No problems operating a MV and uses seat belts  Has a living will and POA  No DME or referrals needed today  RTC one year for medicare wellness     Bailee Parnell, DO

## 2022-04-27 NOTE — PROGRESS NOTES
Assessment/Plan:  Problem List Items Addressed This Visit        Cardiovascular and Mediastinum    Benign essential hypertension - Primary       Musculoskeletal and Integument    Osteopenia    Generalized osteoarthritis       Other    Mild major depression, single episode (HCC)    Hyperlipidemia    Generalized anxiety disorder           Diagnoses and all orders for this visit:    Benign essential hypertension    Mixed hyperlipidemia    Mild major depression, single episode (HCC)    Generalized anxiety disorder    Osteopenia, unspecified location    Generalized osteoarthritis        No problem-specific Assessment & Plan notes found for this encounter  A/P: Doing well and labs are up to date  Discussed dexa and to get more consistent wth the calcium/vit d and also wt bearing exercises  Declines meds  Continue current treatment and RTC four months for routine  Subjective:      Patient ID: Merly Govea is a 68 y o  female  WF RTC for f/u htn, hyperlipidemia, etc  Doing well and no new issues  Remains active w/o difficutly and no falls  FARZAD/MDD are controlled  Chronic pain is manageable  Labs are up to date  The following portions of the patient's history were reviewed and updated as appropriate:   She has a past medical history of Arthritis, Generalized anxiety disorder, Hypertension, and Vaginal prolapse ,  does not have any pertinent problems on file  ,   has a past surgical history that includes Colonoscopy; Hysterectomy; Bilateral oophorectomy; Bony pelvis surgery; Incontinence surgery; Bladder exstrophy closure; Eye surgery; and Cataract extraction  ,  family history includes Arthritis in her mother; Depression in her mother; Diabetes in her family and father; Heart disease in her brother and mother; No Known Problems in her daughter, daughter, maternal aunt, maternal grandmother, paternal aunt, paternal aunt, and paternal grandmother; Prostate cancer in her father; Stroke in her mother; Substance Abuse in her mother  ,   reports that she has never smoked  She has never used smokeless tobacco  She reports that she does not drink alcohol or use drugs  ,  is allergic to aspirin and procaine     Current Outpatient Medications   Medication Sig Dispense Refill    Artificial Tear Ointment (DRY EYES OP) Apply to eye      Bioflavonoid Products (C COMPLEX PO) Take 1 tablet by mouth daily      Calcium Carbonate-Vitamin D (CALCIUM-CARB 600 + D) 600-125 MG-UNIT TABS Take 1 tablet by mouth 2 (two) times a day      Coenzyme Q10 (CO Q-10) 200 MG CAPS Take by mouth      Cranberry 425 MG CAPS Take by mouth      docusate sodium (STOOL SOFTENER) 100 mg capsule Take 1 capsule by mouth      Evening Primrose Oil 1000 MG CAPS Take by mouth      Garlic 0426 MG CAPS Take by mouth      Lutein 40 MG CAPS Take by mouth      metoprolol succinate (TOPROL-XL) 50 mg 24 hr tablet TAKE 1 TABLET DAILY 90 tablet 1    Multiple Vitamin (MULTI-VITAMIN DAILY PO) Take by mouth daily      Omega-3 1000 MG CAPS Take 2 capsules by mouth 2 (two) times a day      Turmeric (CURCUMIN 95) 500 MG CAPS Take 1 capsule by mouth daily       No current facility-administered medications for this visit  Review of Systems   Constitutional: Negative for activity change, chills, diaphoresis, fatigue and fever  HENT: Negative  Eyes: Negative for visual disturbance  Respiratory: Negative for cough, chest tightness, shortness of breath and wheezing  Cardiovascular: Negative for chest pain, palpitations and leg swelling  Gastrointestinal: Negative for abdominal pain, constipation, diarrhea, nausea and vomiting  Endocrine: Negative for cold intolerance and heat intolerance  Genitourinary: Negative for difficulty urinating, dysuria and frequency  Musculoskeletal: Negative for arthralgias, gait problem and myalgias  Neurological: Negative for dizziness, seizures, syncope, weakness, light-headedness and headaches  Psychiatric/Behavioral: Negative for confusion, dysphoric mood and sleep disturbance  The patient is not nervous/anxious  PHQ-9 Depression Screening    PHQ-9:   Frequency of the following problems over the past two weeks:            Objective:  Vitals:    05/27/21 0915   BP: 112/76   Pulse: 65   Temp: (!) 96 °F (35 6 °C)   SpO2: 99%   Weight: 57 6 kg (127 lb)   Height: 5' 2" (1 575 m)     Body mass index is 23 23 kg/m²  Physical Exam  Vitals signs and nursing note reviewed  Constitutional:       General: She is not in acute distress  Appearance: Normal appearance  She is not ill-appearing  HENT:      Head: Normocephalic and atraumatic  Mouth/Throat:      Mouth: Mucous membranes are moist    Eyes:      Extraocular Movements: Extraocular movements intact  Conjunctiva/sclera: Conjunctivae normal       Pupils: Pupils are equal, round, and reactive to light  Neck:      Musculoskeletal: Neck supple  Vascular: No carotid bruit  Cardiovascular:      Rate and Rhythm: Normal rate and regular rhythm  Heart sounds: Normal heart sounds  Pulmonary:      Effort: Pulmonary effort is normal  No respiratory distress  Breath sounds: Normal breath sounds  No wheezing or rales  Abdominal:      General: Bowel sounds are normal  There is no distension  Palpations: Abdomen is soft  Tenderness: There is no abdominal tenderness  Musculoskeletal:      Right lower leg: No edema  Left lower leg: No edema  Neurological:      General: No focal deficit present  Mental Status: She is alert and oriented to person, place, and time  Mental status is at baseline  Psychiatric:         Mood and Affect: Mood normal          Behavior: Behavior normal          Thought Content:  Thought content normal          Judgment: Judgment normal  [Home] : at home, [unfilled] , at the time of the visit. [Medical Office: ([facility])___] : at the medical office located in  [Verbal consent obtained from patient] : the patient, [unfilled] [Follow-Up Visit] : a follow-up visit for [Morbid Obesity (BMI>40)] : morbid obesity (bmi>40)

## 2022-06-01 ENCOUNTER — OFFICE VISIT (OUTPATIENT)
Dept: INTERNAL MEDICINE CLINIC | Facility: CLINIC | Age: 82
End: 2022-06-01
Payer: MEDICARE

## 2022-06-01 VITALS
HEART RATE: 64 BPM | TEMPERATURE: 98.6 F | SYSTOLIC BLOOD PRESSURE: 130 MMHG | OXYGEN SATURATION: 98 % | HEIGHT: 62 IN | BODY MASS INDEX: 23.3 KG/M2 | WEIGHT: 126.6 LBS | RESPIRATION RATE: 14 BRPM | DIASTOLIC BLOOD PRESSURE: 66 MMHG

## 2022-06-01 DIAGNOSIS — E78.2 MIXED HYPERLIPIDEMIA: ICD-10-CM

## 2022-06-01 DIAGNOSIS — F41.1 GENERALIZED ANXIETY DISORDER: ICD-10-CM

## 2022-06-01 DIAGNOSIS — M15.9 GENERALIZED OSTEOARTHRITIS: ICD-10-CM

## 2022-06-01 DIAGNOSIS — F32.0 MILD MAJOR DEPRESSION, SINGLE EPISODE (HCC): ICD-10-CM

## 2022-06-01 DIAGNOSIS — M85.80 OSTEOPENIA, UNSPECIFIED LOCATION: ICD-10-CM

## 2022-06-01 DIAGNOSIS — Z23 ENCOUNTER FOR VACCINATION: ICD-10-CM

## 2022-06-01 DIAGNOSIS — I10 BENIGN ESSENTIAL HYPERTENSION: Primary | ICD-10-CM

## 2022-06-01 PROCEDURE — 99214 OFFICE O/P EST MOD 30 MIN: CPT | Performed by: INTERNAL MEDICINE

## 2022-06-01 NOTE — PATIENT INSTRUCTIONS
Chronic Hypertension   AMBULATORY CARE:   Hypertension  is high blood pressure  Your blood pressure is the force of your blood moving against the walls of your arteries  Hypertension causes your blood pressure to get so high that your heart has to work much harder than normal  This can damage your heart  Even if you have hypertension for years, lifestyle changes, medicines, or both can help bring your blood pressure to normal   Call your local emergency number (911 in the [address]) or have someone call if:   You have chest pain  You have any of the following signs of a heart attack:      Squeezing, pressure, or pain in your chest    You may  also have any of the following:     Discomfort or pain in your back, neck, jaw, stomach, or arm    Shortness of breath    Nausea or vomiting    Lightheadedness or a sudden cold sweat    You become confused or have difficulty speaking  You suddenly feel lightheaded or have trouble breathing  Seek care immediately if:   You have a severe headache or vision loss  You have weakness in an arm or leg  Call your doctor or cardiologist if:   You feel faint, dizzy, confused, or drowsy  You have been taking your blood pressure medicine but your pressure is higher than your provider says it should be  You have questions or concerns about your condition or care  Treatment for chronic hypertension  may include medicine to lower your blood pressure and cholesterol levels  A low cholesterol level helps prevent heart disease and makes it easier to control your blood pressure  Heart disease can make your blood pressure harder to control  You may also need to make lifestyle changes  What you need to know about the stages of hypertension:       Normal blood pressure is 119/79 or lower   Your healthcare provider may only check your blood pressure each year if it stays at a normal level  Elevated blood pressure is 120/79 to 129/79   This is sometimes called prehypertension  Your healthcare provider may suggest lifestyle changes to help lower your blood pressure to a normal level  He or she may then check it again in 3 to 6 months  Stage 1 hypertension is 130/80  to 139/89   Your provider may recommend lifestyle changes, medication, and checks every 3 to 6 months until your blood pressure is controlled  Stage 2 hypertension is 140/90 or higher   Your provider will recommend lifestyle changes and have you take 2 kinds of hypertension medicines  You will also need to have your blood pressure checked monthly until it is controlled  Manage chronic hypertension:   Check your blood pressure at home  Avoid smoking, caffeine, and exercise at least 30 minutes before checking your blood pressure  Sit and rest for 5 minutes before you take your blood pressure  Extend your arm and support it on a flat surface  Your arm should be at the same level as your heart  Follow the directions that came with your blood pressure monitor  Check your blood pressure 2 times, 1 minute apart, before you take your medicine in the morning  Also check your blood pressure before your evening meal  Keep a record of your readings and bring it to your follow-up visits  Ask your healthcare provider what your blood pressure should be  Manage any other health conditions you have  Health conditions such as diabetes can increase your risk for hypertension  Follow your healthcare provider's instructions and take all your medicines as directed  Talk to your healthcare provider about any new health conditions you have recently developed  Ask about all medicines  Certain medicines can increase your blood pressure  Examples include oral birth control pills, decongestants, herbal supplements, and NSAIDs, such as ibuprofen  Your healthcare provider can tell you which medicines are safe for you to take  This includes prescription and over-the-counter medicines      Lifestyle changes you can make to lower your blood pressure: Your provider may want you to make more lifestyle changes if you are having trouble controlling your blood pressure  This may feel difficult over time, especially if you think you are making good changes but your pressure is still high  It might help to focus on one new change at a time  For example, try to add 1 more day of exercise, or exercise for an extra 10 minutes on 2 days  Small changes can make a big difference  Your healthcare provider can also refer you to specialists such as a dietitian who can help you make small changes  Your family members may be included in helping you learn to create lifestyle changes, such as the following:     Limit sodium (salt) as directed  Too much sodium can affect your fluid balance  Check labels to find low-sodium or no-salt-added foods  Some low-sodium foods use potassium salts for flavor  Too much potassium can also cause health problems  Your healthcare provider will tell you how much sodium and potassium are safe for you to have in a day  He or she may recommend that you limit sodium to 2,300 mg a day  Follow the meal plan recommended by your healthcare provider  A dietitian or your provider can give you more information on low-sodium plans or the DASH (Dietary Approaches to Stop Hypertension) eating plan  The DASH plan is low in sodium, processed sugar, unhealthy fats, and total fat  It is high in potassium, calcium, and fiber  These can be found in vegetables, fruit, and whole-grain foods  Be physically active throughout the day  Physical activity, such as exercise, can help control your blood pressure and your weight  Be physically active for at least 30 minutes per day, on most days of the week  Include aerobic activity, such as walking or riding a bicycle  Also include strength training at least 2 times each week  Your healthcare providers can help you create a physical activity plan  Decrease stress    This may help lower your blood pressure  Learn ways to relax, such as deep breathing or listening to music  Limit alcohol as directed  Alcohol can increase your blood pressure  A drink of alcohol is 12 ounces of beer, 5 ounces of wine, or 1½ ounces of liquor  Do not smoke  Nicotine and other chemicals in cigarettes and cigars can increase your blood pressure and also cause lung damage  Ask your healthcare provider for information if you currently smoke and need help to quit  E-cigarettes or smokeless tobacco still contain nicotine  Talk to your healthcare provider before you use these products  Follow up with your doctor or cardiologist as directed: You will need to return to have your blood pressure checked and to have other lab tests done  Write down your questions so you remember to ask them during your visits  © Copyright [employer] 2022 Information is for End User's use only and may not be sold, redistributed or otherwise used for commercial purposes  All illustrations and images included in CareNotes® are the copyrighted property of [employer] Inc  or [facility] Francisca Esposito   The above information is an  only  It is not intended as medical advice for individual conditions or treatments  Talk to your doctor, nurse or pharmacist before following any medical regimen to see if it is safe and effective for you

## 2022-06-01 NOTE — PROGRESS NOTES
Assessment/Plan:  Problem List Items Addressed This Visit        Cardiovascular and Mediastinum    Benign essential hypertension - Primary       Musculoskeletal and Integument    Osteopenia    Generalized osteoarthritis       Other    Mild major depression, single episode ([facility])    Hyperlipidemia    Generalized anxiety disorder      Other Visit Diagnoses     Encounter for vaccination               Diagnoses and all orders for this visit:    Benign essential hypertension    Osteopenia, unspecified location    Generalized osteoarthritis    Mild major depression, single episode ([facility] [address] )    Mixed hyperlipidemia    Generalized anxiety disorder    Encounter for vaccination      No problem-specific Assessment & Plan notes found for this encounter  A/P: Doing well and labs are up to date  ?cause of the intermittent vax site changes  Discussed pneumo vaccine defers  Continue current treatment and RTC four months for routine  Subjective:      Patient ID: Demetrius Alvarez is a 80 y o  female  WF RTC for f/u HTN, hyperlipidemia, etc  Doing well and only c/o is intermittent redness and discomfort at the site of a prior flu vaccine  Remains active w/o difficulty and no falls  MDD/FARZAD is controlled  Chronic pain is manageable  Labs are up to date  Due for vaccines  The following portions of the patient's history were reviewed and updated as appropriate:   She has a past medical history of Arthritis, Generalized anxiety disorder, Hypertension, and Vaginal prolapse ,  does not have any pertinent problems on file  ,   has a past surgical history that includes Colonoscopy; Hysterectomy; Bilateral oophorectomy; Bony pelvis surgery; Incontinence surgery; Bladder exstrophy closure; Eye surgery; and Cataract extraction  ,  family history includes Arthritis in her mother; Depression in her mother; Diabetes in her family and father; Heart disease in her brother and mother; No Known Problems in her daughter, daughter, maternal aunt, maternal grandmother, paternal aunt, paternal aunt, and paternal grandmother; Prostate cancer in her father; Stroke in her mother; Substance Abuse in her mother  ,   reports that she has never smoked  She has never used smokeless tobacco  She reports that she does not drink alcohol and does not use drugs  ,  is allergic to aspirin and procaine     Current Outpatient Medications   Medication Sig Dispense Refill    Artificial Tear Ointment (DRY EYES OP) Apply to eye      Bioflavonoid Products (C COMPLEX PO) Take 1 tablet by mouth daily      Calcium Carbonate-Vitamin D 600-125 MG-UNIT TABS Take 1 tablet by mouth 2 (two) times a day      Coenzyme Q10 (CO Q-10) 200 MG CAPS Take by mouth      Cranberry 425 MG CAPS Take by mouth      docusate sodium (COLACE) 100 mg capsule Take 1 capsule by mouth      Evening Primrose Oil 1000 MG CAPS Take by mouth      Garlic 5903 MG CAPS Take by mouth      Lutein 40 MG CAPS Take by mouth      metoprolol succinate (TOPROL-XL) 50 mg 24 hr tablet TAKE 1 TABLET DAILY 90 tablet 3    Multiple Vitamin (MULTI-VITAMIN DAILY PO) Take by mouth daily      Omega-3 1000 MG CAPS Take 2 capsules by mouth 2 (two) times a day      Turmeric 500 MG CAPS Take 1 capsule by mouth daily       No current facility-administered medications for this visit  Review of Systems   Constitutional: Negative for activity change, chills, diaphoresis, fatigue and fever  HENT: Negative  Eyes: Negative for visual disturbance  Respiratory: Negative for cough, chest tightness, shortness of breath and wheezing  Cardiovascular: Negative for chest pain, palpitations and leg swelling  Gastrointestinal: Negative for abdominal pain, constipation, diarrhea, nausea and vomiting  Endocrine: Negative for cold intolerance and heat intolerance  Genitourinary: Negative for difficulty urinating, dysuria and frequency  Musculoskeletal: Negative for arthralgias, gait problem and myalgias     Neurological: Negative for dizziness, seizures, syncope, weakness, light-headedness and headaches  Psychiatric/Behavioral: Negative for confusion, dysphoric mood and sleep disturbance  The patient is not nervous/anxious  PHQ-2/9 Depression Screening          Objective:  Vitals:    06/01/22 0912   BP: 130/66   BP Location: Left arm   Patient Position: Sitting   Cuff Size: Standard   Pulse: 64   Resp: 14   Temp: 98 6 °F (37 °C)   SpO2: 98%   Weight: 57 4 kg (126 lb 9 6 oz)   Height: 5' 2" (1 575 m)     Body mass index is 23 16 kg/m²  Physical Exam  Vitals and nursing note reviewed  Constitutional:       General: She is not in acute distress  Appearance: Normal appearance  She is not ill-appearing  HENT:      Head: Normocephalic and atraumatic  Mouth/Throat:      Mouth: Mucous membranes are moist    Eyes:      Extraocular Movements: Extraocular movements intact  Conjunctiva/sclera: Conjunctivae normal       Pupils: Pupils are equal, round, and reactive to light  Neck:      Vascular: No carotid bruit  Cardiovascular:      Rate and Rhythm: Normal rate and regular rhythm  Heart sounds: Normal heart sounds  Pulmonary:      Effort: Pulmonary effort is normal  No respiratory distress  Breath sounds: Normal breath sounds  No wheezing or rales  Abdominal:      General: Bowel sounds are normal  There is no distension  Palpations: Abdomen is soft  Tenderness: There is no abdominal tenderness  Musculoskeletal:      Cervical back: Neck supple  Right lower leg: No edema  Left lower leg: No edema  Neurological:      General: No focal deficit present  Mental Status: She is alert and oriented to person, place, and time  Mental status is at baseline  Psychiatric:         Mood and Affect: Mood normal          Behavior: Behavior normal          Thought Content:  Thought content normal          Judgment: Judgment normal

## 2022-07-07 ENCOUNTER — TELEPHONE (OUTPATIENT)
Dept: INTERNAL MEDICINE CLINIC | Facility: CLINIC | Age: 82
End: 2022-07-07

## 2022-07-07 NOTE — TELEPHONE ENCOUNTER
----- Message from Pierre Austin DO sent at 6/1/2022  9:32 AM EDT -----  Call pt in four weeks and get up date on the discomfort at the vaccine site

## 2022-10-06 ENCOUNTER — OFFICE VISIT (OUTPATIENT)
Dept: INTERNAL MEDICINE CLINIC | Facility: CLINIC | Age: 82
End: 2022-10-06
Payer: MEDICARE

## 2022-10-06 ENCOUNTER — APPOINTMENT (OUTPATIENT)
Dept: LAB | Facility: CLINIC | Age: 82
End: 2022-10-06
Payer: MEDICARE

## 2022-10-06 VITALS
WEIGHT: 126.6 LBS | DIASTOLIC BLOOD PRESSURE: 78 MMHG | BODY MASS INDEX: 23.3 KG/M2 | OXYGEN SATURATION: 98 % | SYSTOLIC BLOOD PRESSURE: 140 MMHG | HEIGHT: 62 IN | HEART RATE: 68 BPM | TEMPERATURE: 97.8 F

## 2022-10-06 DIAGNOSIS — Z13.1 SCREENING FOR DIABETES MELLITUS (DM): ICD-10-CM

## 2022-10-06 DIAGNOSIS — R79.9 ABNORMAL FINDING OF BLOOD CHEMISTRY, UNSPECIFIED: ICD-10-CM

## 2022-10-06 DIAGNOSIS — M15.9 GENERALIZED OSTEOARTHRITIS: ICD-10-CM

## 2022-10-06 DIAGNOSIS — M85.80 OSTEOPENIA, UNSPECIFIED LOCATION: ICD-10-CM

## 2022-10-06 DIAGNOSIS — Z86.59 HISTORY OF DEPRESSION: ICD-10-CM

## 2022-10-06 DIAGNOSIS — F41.1 GENERALIZED ANXIETY DISORDER: ICD-10-CM

## 2022-10-06 DIAGNOSIS — E78.2 MIXED HYPERLIPIDEMIA: ICD-10-CM

## 2022-10-06 DIAGNOSIS — I10 BENIGN ESSENTIAL HYPERTENSION: ICD-10-CM

## 2022-10-06 DIAGNOSIS — Z23 ENCOUNTER FOR VACCINATION: ICD-10-CM

## 2022-10-06 DIAGNOSIS — I10 BENIGN ESSENTIAL HYPERTENSION: Primary | ICD-10-CM

## 2022-10-06 LAB
ALBUMIN SERPL BCP-MCNC: 4.1 G/DL (ref 3.5–5)
ALP SERPL-CCNC: 77 U/L (ref 46–116)
ALT SERPL W P-5'-P-CCNC: 34 U/L (ref 12–78)
ANION GAP SERPL CALCULATED.3IONS-SCNC: 3 MMOL/L (ref 4–13)
AST SERPL W P-5'-P-CCNC: 22 U/L (ref 5–45)
BASOPHILS # BLD AUTO: 0.08 THOUSANDS/ΜL (ref 0–0.1)
BASOPHILS NFR BLD AUTO: 1 % (ref 0–1)
BILIRUB SERPL-MCNC: 0.56 MG/DL (ref 0.2–1)
BUN SERPL-MCNC: 15 MG/DL (ref 5–25)
CALCIUM SERPL-MCNC: 9.6 MG/DL (ref 8.3–10.1)
CHLORIDE SERPL-SCNC: 100 MMOL/L (ref 96–108)
CHOLEST SERPL-MCNC: 191 MG/DL
CO2 SERPL-SCNC: 31 MMOL/L (ref 21–32)
CREAT SERPL-MCNC: 0.81 MG/DL (ref 0.6–1.3)
CREAT UR-MCNC: 29.6 MG/DL
EOSINOPHIL # BLD AUTO: 0.12 THOUSAND/ΜL (ref 0–0.61)
EOSINOPHIL NFR BLD AUTO: 2 % (ref 0–6)
ERYTHROCYTE [DISTWIDTH] IN BLOOD BY AUTOMATED COUNT: 12.1 % (ref 11.6–15.1)
EST. AVERAGE GLUCOSE BLD GHB EST-MCNC: 114 MG/DL
GFR SERPL CREATININE-BSD FRML MDRD: 68 ML/MIN/1.73SQ M
GLUCOSE P FAST SERPL-MCNC: 104 MG/DL (ref 65–99)
HBA1C MFR BLD: 5.6 %
HCT VFR BLD AUTO: 43.2 % (ref 34.8–46.1)
HDLC SERPL-MCNC: 61 MG/DL
HGB BLD-MCNC: 14.7 G/DL (ref 11.5–15.4)
IMM GRANULOCYTES # BLD AUTO: 0.02 THOUSAND/UL (ref 0–0.2)
IMM GRANULOCYTES NFR BLD AUTO: 0 % (ref 0–2)
LDLC SERPL CALC-MCNC: 111 MG/DL (ref 0–100)
LYMPHOCYTES # BLD AUTO: 1.59 THOUSANDS/ΜL (ref 0.6–4.47)
LYMPHOCYTES NFR BLD AUTO: 26 % (ref 14–44)
MCH RBC QN AUTO: 29.9 PG (ref 26.8–34.3)
MCHC RBC AUTO-ENTMCNC: 34 G/DL (ref 31.4–37.4)
MCV RBC AUTO: 88 FL (ref 82–98)
MICROALBUMIN UR-MCNC: <5 MG/L (ref 0–20)
MICROALBUMIN/CREAT 24H UR: <17 MG/G CREATININE (ref 0–30)
MONOCYTES # BLD AUTO: 0.47 THOUSAND/ΜL (ref 0.17–1.22)
MONOCYTES NFR BLD AUTO: 8 % (ref 4–12)
NEUTROPHILS # BLD AUTO: 3.78 THOUSANDS/ΜL (ref 1.85–7.62)
NEUTS SEG NFR BLD AUTO: 63 % (ref 43–75)
NRBC BLD AUTO-RTO: 0 /100 WBCS
PLATELET # BLD AUTO: 282 THOUSANDS/UL (ref 149–390)
PMV BLD AUTO: 9.6 FL (ref 8.9–12.7)
POTASSIUM SERPL-SCNC: 4.4 MMOL/L (ref 3.5–5.3)
PROT SERPL-MCNC: 7.6 G/DL (ref 6.4–8.4)
RBC # BLD AUTO: 4.91 MILLION/UL (ref 3.81–5.12)
SODIUM SERPL-SCNC: 134 MMOL/L (ref 135–147)
TRIGL SERPL-MCNC: 94 MG/DL
TSH SERPL DL<=0.05 MIU/L-ACNC: 0.71 UIU/ML (ref 0.45–4.5)
WBC # BLD AUTO: 6.06 THOUSAND/UL (ref 4.31–10.16)

## 2022-10-06 PROCEDURE — 84443 ASSAY THYROID STIM HORMONE: CPT

## 2022-10-06 PROCEDURE — 82570 ASSAY OF URINE CREATININE: CPT | Performed by: INTERNAL MEDICINE

## 2022-10-06 PROCEDURE — 83036 HEMOGLOBIN GLYCOSYLATED A1C: CPT

## 2022-10-06 PROCEDURE — 90662 IIV NO PRSV INCREASED AG IM: CPT | Performed by: INTERNAL MEDICINE

## 2022-10-06 PROCEDURE — 99214 OFFICE O/P EST MOD 30 MIN: CPT | Performed by: INTERNAL MEDICINE

## 2022-10-06 PROCEDURE — 80053 COMPREHEN METABOLIC PANEL: CPT

## 2022-10-06 PROCEDURE — 82043 UR ALBUMIN QUANTITATIVE: CPT | Performed by: INTERNAL MEDICINE

## 2022-10-06 PROCEDURE — 36415 COLL VENOUS BLD VENIPUNCTURE: CPT

## 2022-10-06 PROCEDURE — G0008 ADMIN INFLUENZA VIRUS VAC: HCPCS | Performed by: INTERNAL MEDICINE

## 2022-10-06 PROCEDURE — 85025 COMPLETE CBC W/AUTO DIFF WBC: CPT

## 2022-10-06 PROCEDURE — 80061 LIPID PANEL: CPT

## 2022-10-06 NOTE — PROGRESS NOTES
Assessment/Plan:  Problem List Items Addressed This Visit        Cardiovascular and Mediastinum    Benign essential hypertension - Primary    Relevant Orders    CBC and differential    Comprehensive metabolic panel    Hemoglobin A1C    Microalbumin / creatinine urine ratio    TSH, 3rd generation with Free T4 reflex       Musculoskeletal and Integument    Osteopenia    Relevant Orders    TSH, 3rd generation with Free T4 reflex    Generalized osteoarthritis    Relevant Orders    Comprehensive metabolic panel       Other    Mild major depression, single episode (HCC)    Hyperlipidemia    Relevant Orders    Lipid Panel with Direct LDL reflex    Generalized anxiety disorder    Relevant Orders    TSH, 3rd generation with Free T4 reflex      Other Visit Diagnoses     Encounter for vaccination        Relevant Orders    influenza vaccine, high-dose, PF 0 5 mL    Screening for diabetes mellitus (DM)        Relevant Orders    Hemoglobin A1C    Abnormal finding of blood chemistry, unspecified         Relevant Orders    Hemoglobin A1C           Diagnoses and all orders for this visit:    Benign essential hypertension  -     CBC and differential; Future  -     Comprehensive metabolic panel; Future  -     Hemoglobin A1C; Future  -     Microalbumin / creatinine urine ratio  -     TSH, 3rd generation with Free T4 reflex; Future    Osteopenia, unspecified location  -     TSH, 3rd generation with Free T4 reflex; Future    Generalized osteoarthritis  -     Comprehensive metabolic panel; Future    Mixed hyperlipidemia  -     Lipid Panel with Direct LDL reflex; Future    Generalized anxiety disorder  -     TSH, 3rd generation with Free T4 reflex;  Future    History of depression    Encounter for vaccination  -     influenza vaccine, high-dose, PF 0 5 mL    Screening for diabetes mellitus (DM)  -     Hemoglobin A1C; Future    Abnormal finding of blood chemistry, unspecified   -     Hemoglobin A1C; Future      No problem-specific Assessment & Plan notes found for this encounter  A/P: Doing well and will check labs  Discussed vaccines will up date her flu vaccine  Continue current treatment and RTC four months for routine  Subjective:      Patient ID: Tha Corbin is a [de-identified] y o  female  WF RTC for f/u HTN, hyperlipidemia, etc  Doing ok and no new issues  Remains active w/o difficulty and no falls  FARZAD/MDD are controlled  Chronic pain is manageable  Due for labs and vaccines  The following portions of the patient's history were reviewed and updated as appropriate:   She has a past medical history of Arthritis, Generalized anxiety disorder, Hypertension, and Vaginal prolapse ,  does not have any pertinent problems on file  ,   has a past surgical history that includes Colonoscopy; Hysterectomy; Bilateral oophorectomy; Bony pelvis surgery; Incontinence surgery; Bladder exstrophy closure; Eye surgery; and Cataract extraction  ,  family history includes Arthritis in her mother; Depression in her mother; Diabetes in her family and father; Heart disease in her brother and mother; No Known Problems in her daughter, daughter, maternal aunt, maternal grandmother, paternal aunt, paternal aunt, and paternal grandmother; Prostate cancer in her father; Stroke in her mother; Substance Abuse in her mother  ,   reports that she has never smoked  She has never used smokeless tobacco  She reports that she does not drink alcohol and does not use drugs  ,  is allergic to aspirin and procaine     Current Outpatient Medications   Medication Sig Dispense Refill    Artificial Tear Ointment (DRY EYES OP) Administer 1 application to both eyes 2 (two) times a day      Bioflavonoid Products (C COMPLEX PO) Take 1 tablet by mouth daily      Calcium Carbonate-Vitamin D 600-125 MG-UNIT TABS Take 1 tablet by mouth 2 (two) times a day      Coenzyme Q10 (CO Q-10) 200 MG CAPS Take by mouth      Cranberry 425 MG CAPS Take by mouth      docusate sodium (COLACE) 100 mg capsule Take 1 capsule by mouth      Evening Primrose Oil 1000 MG CAPS Take by mouth      Garlic 2690 MG CAPS Take by mouth      Lutein 40 MG CAPS Take by mouth      metoprolol succinate (TOPROL-XL) 50 mg 24 hr tablet TAKE 1 TABLET DAILY 90 tablet 3    Multiple Vitamin (MULTI-VITAMIN DAILY PO) Take by mouth daily      Omega-3 1000 MG CAPS Take 2 capsules by mouth 2 (two) times a day      Turmeric 500 MG CAPS Take 1 capsule by mouth daily       No current facility-administered medications for this visit  Review of Systems   Constitutional: Negative for activity change, chills, diaphoresis, fatigue and fever  HENT: Negative  Eyes: Negative for visual disturbance  Respiratory: Negative for cough, chest tightness, shortness of breath and wheezing  Cardiovascular: Negative for chest pain, palpitations and leg swelling  Gastrointestinal: Negative for abdominal pain, constipation, diarrhea, nausea and vomiting  Endocrine: Negative for cold intolerance and heat intolerance  Genitourinary: Negative for difficulty urinating, dysuria and frequency  Musculoskeletal: Negative for arthralgias, gait problem and myalgias  Neurological: Negative for dizziness, seizures, syncope, weakness, light-headedness and headaches  Psychiatric/Behavioral: Negative for confusion, dysphoric mood and sleep disturbance  The patient is not nervous/anxious  PHQ-2/9 Depression Screening          Objective:  Vitals:    10/06/22 0853   BP: 140/78   Pulse: 68   Temp: 97 8 °F (36 6 °C)   TempSrc: Tympanic   SpO2: 98%   Weight: 57 4 kg (126 lb 9 6 oz)   Height: 5' 2" (1 575 m)     Body mass index is 23 16 kg/m²  Physical Exam  Vitals and nursing note reviewed  Constitutional:       General: She is not in acute distress  Appearance: Normal appearance  She is not ill-appearing  HENT:      Head: Normocephalic and atraumatic        Mouth/Throat:      Mouth: Mucous membranes are moist    Eyes: Extraocular Movements: Extraocular movements intact  Conjunctiva/sclera: Conjunctivae normal       Pupils: Pupils are equal, round, and reactive to light  Cardiovascular:      Rate and Rhythm: Normal rate and regular rhythm  Heart sounds: Normal heart sounds  Pulmonary:      Effort: Pulmonary effort is normal  No respiratory distress  Breath sounds: Normal breath sounds  No wheezing or rales  Abdominal:      General: Bowel sounds are normal  There is no distension  Palpations: Abdomen is soft  Tenderness: There is no abdominal tenderness  Musculoskeletal:      Cervical back: Neck supple  Right lower leg: No edema  Left lower leg: No edema  Neurological:      General: No focal deficit present  Mental Status: She is alert and oriented to person, place, and time  Mental status is at baseline  Psychiatric:         Mood and Affect: Mood normal          Behavior: Behavior normal          Thought Content:  Thought content normal          Judgment: Judgment normal

## 2022-10-06 NOTE — PATIENT INSTRUCTIONS
Chronic Hypertension   AMBULATORY CARE:   Hypertension  is high blood pressure  Your blood pressure is the force of your blood moving against the walls of your arteries  Hypertension causes your blood pressure to get so high that your heart has to work much harder than normal  This can damage your heart  Even if you have hypertension for years, lifestyle changes, medicines, or both can help bring your blood pressure to normal   Call your local emergency number (911 in the [address] [facility]) or have someone call if:   You have chest pain  You have any of the following signs of a heart attack:      Squeezing, pressure, or pain in your chest    You may  also have any of the following:     Discomfort or pain in your back, neck, jaw, stomach, or arm    Shortness of breath    Nausea or vomiting    Lightheadedness or a sudden cold sweat    You become confused or have difficulty speaking  You suddenly feel lightheaded or have trouble breathing  Seek care immediately if:   You have a severe headache or vision loss  You have weakness in an arm or leg  Call your doctor or cardiologist if:   You feel faint, dizzy, confused, or drowsy  You have been taking your blood pressure medicine but your pressure is higher than your provider says it should be  You have questions or concerns about your condition or care  Treatment for chronic hypertension  may include medicine to lower your blood pressure and cholesterol levels  A low cholesterol level helps prevent heart disease and makes it easier to control your blood pressure  Heart disease can make your blood pressure harder to control  You may also need to make lifestyle changes  What you need to know about the stages of hypertension:       Normal blood pressure is 119/79 or lower   Your healthcare provider may only check your blood pressure each year if it stays at a normal level  Elevated blood pressure is 120/79 to 129/79   This is sometimes called prehypertension  Your healthcare provider may suggest lifestyle changes to help lower your blood pressure to a normal level  He or she may then check it again in 3 to 6 months  Stage 1 hypertension is 130/80  to 139/89   Your provider may recommend lifestyle changes, medication, and checks every 3 to 6 months until your blood pressure is controlled  Stage 2 hypertension is 140/90 or higher   Your provider will recommend lifestyle changes and have you take 2 kinds of hypertension medicines  You will also need to have your blood pressure checked monthly until it is controlled  Manage chronic hypertension:   Check your blood pressure at home  Avoid smoking, caffeine, and exercise at least 30 minutes before checking your blood pressure  Sit and rest for 5 minutes before you take your blood pressure  Extend your arm and support it on a flat surface  Your arm should be at the same level as your heart  Follow the directions that came with your blood pressure monitor  Check your blood pressure 2 times, 1 minute apart, before you take your medicine in the morning  Also check your blood pressure before your evening meal  Keep a record of your readings and bring it to your follow-up visits  Ask your healthcare provider what your blood pressure should be  Manage any other health conditions you have  Health conditions such as diabetes can increase your risk for hypertension  Follow your healthcare provider's instructions and take all your medicines as directed  Talk to your healthcare provider about any new health conditions you have recently developed  Ask about all medicines  Certain medicines can increase your blood pressure  Examples include oral birth control pills, decongestants, herbal supplements, and NSAIDs, such as ibuprofen  Your healthcare provider can tell you which medicines are safe for you to take  This includes prescription and over-the-counter medicines      Lifestyle changes you can make to lower your blood pressure: Your provider may want you to make more lifestyle changes if you are having trouble controlling your blood pressure  This may feel difficult over time, especially if you think you are making good changes but your pressure is still high  It might help to focus on one new change at a time  For example, try to add 1 more day of exercise, or exercise for an extra 10 minutes on 2 days  Small changes can make a big difference  Your healthcare provider can also refer you to specialists such as a dietitian who can help you make small changes  Your family members may be included in helping you learn to create lifestyle changes, such as the following:     Limit sodium (salt) as directed  Too much sodium can affect your fluid balance  Check labels to find low-sodium or no-salt-added foods  Some low-sodium foods use potassium salts for flavor  Too much potassium can also cause health problems  Your healthcare provider will tell you how much sodium and potassium are safe for you to have in a day  He or she may recommend that you limit sodium to 2,300 mg a day  Follow the meal plan recommended by your healthcare provider  A dietitian or your provider can give you more information on low-sodium plans or the DASH (Dietary Approaches to Stop Hypertension) eating plan  The DASH plan is low in sodium, processed sugar, unhealthy fats, and total fat  It is high in potassium, calcium, and fiber  These can be found in vegetables, fruit, and whole-grain foods  Be physically active throughout the day  Physical activity, such as exercise, can help control your blood pressure and your weight  Be physically active for at least 30 minutes per day, on most days of the week  Include aerobic activity, such as walking or riding a bicycle  Also include strength training at least 2 times each week  Your healthcare providers can help you create a physical activity plan  Decrease stress    This may help lower your blood pressure  Learn ways to relax, such as deep breathing or listening to music  Limit alcohol as directed  Alcohol can increase your blood pressure  A drink of alcohol is 12 ounces of beer, 5 ounces of wine, or 1½ ounces of liquor  Do not smoke  Nicotine and other chemicals in cigarettes and cigars can increase your blood pressure and also cause lung damage  Ask your healthcare provider for information if you currently smoke and need help to quit  E-cigarettes or smokeless tobacco still contain nicotine  Talk to your healthcare provider before you use these products  Follow up with your doctor or cardiologist as directed: You will need to return to have your blood pressure checked and to have other lab tests done  Write down your questions so you remember to ask them during your visits  © Copyright [employer] 2022 Information is for End User's use only and may not be sold, redistributed or otherwise used for commercial purposes  All illustrations and images included in CareNotes® are the copyrighted property of [employer]  or [facility] Francisca Esposito   The above information is an  only  It is not intended as medical advice for individual conditions or treatments  Talk to your doctor, nurse or pharmacist before following any medical regimen to see if it is safe and effective for you

## 2022-11-28 DIAGNOSIS — R00.2 PALPITATIONS: ICD-10-CM

## 2022-11-28 DIAGNOSIS — F41.1 GENERALIZED ANXIETY DISORDER: ICD-10-CM

## 2022-11-28 DIAGNOSIS — I10 BENIGN ESSENTIAL HYPERTENSION: ICD-10-CM

## 2022-11-28 RX ORDER — METOPROLOL SUCCINATE 50 MG/1
TABLET, EXTENDED RELEASE ORAL
Qty: 90 TABLET | Refills: 3 | Status: SHIPPED | OUTPATIENT
Start: 2022-11-28

## 2023-02-06 ENCOUNTER — OFFICE VISIT (OUTPATIENT)
Dept: INTERNAL MEDICINE CLINIC | Facility: CLINIC | Age: 83
End: 2023-02-06

## 2023-02-06 VITALS
WEIGHT: 126 LBS | DIASTOLIC BLOOD PRESSURE: 60 MMHG | OXYGEN SATURATION: 99 % | BODY MASS INDEX: 23.19 KG/M2 | HEART RATE: 95 BPM | TEMPERATURE: 97.5 F | HEIGHT: 62 IN | SYSTOLIC BLOOD PRESSURE: 112 MMHG

## 2023-02-06 DIAGNOSIS — F32.0 MILD MAJOR DEPRESSION, SINGLE EPISODE (HCC): ICD-10-CM

## 2023-02-06 DIAGNOSIS — I10 BENIGN ESSENTIAL HYPERTENSION: Primary | ICD-10-CM

## 2023-02-06 DIAGNOSIS — M85.80 OSTEOPENIA, UNSPECIFIED LOCATION: ICD-10-CM

## 2023-02-06 DIAGNOSIS — F41.1 GENERALIZED ANXIETY DISORDER: ICD-10-CM

## 2023-02-06 DIAGNOSIS — Z23 ENCOUNTER FOR VACCINATION: ICD-10-CM

## 2023-02-06 DIAGNOSIS — M15.9 GENERALIZED OSTEOARTHRITIS: ICD-10-CM

## 2023-02-06 DIAGNOSIS — Z00.00 MEDICARE ANNUAL WELLNESS VISIT, SUBSEQUENT: ICD-10-CM

## 2023-02-06 DIAGNOSIS — E78.2 MIXED HYPERLIPIDEMIA: ICD-10-CM

## 2023-02-06 NOTE — PROGRESS NOTES
Assessment/Plan:  Problem List Items Addressed This Visit        Cardiovascular and Mediastinum    Benign essential hypertension - Primary       Musculoskeletal and Integument    Osteopenia    Generalized osteoarthritis       Other    M[facility] major [facility] )    Hyperlipidemia    Generalized anxiety disorder   Other Visit Diagnoses     Medicare annual wellness visit, subsequent        Encounter for vaccination        Relevant Orders    Pneumococcal Conjugate Vaccine 20-valent (Pcv20)           Diagnoses and all orders for this visit:    Benign essential hypertension    Generalized osteoarthritis    Osteopenia, unspecified location    Generalized anxiety disorder    Mixed hyperlipidemia    Mild major depression, single episode ([facility] )    Medicare annual wellness visit, subsequent    Encounter for vaccination  -     Pneumococcal Conjugate Vaccine 20-valent (Pcv20)      No problem-specific Assessment & Plan notes found for this encounter  A/P: Doing ok and labs are up to date  Discussed vaccines and already had her flu vaccine  Discussed pneumonia vaccines and will up date it      Continue current treatment and RTC four months for routine  Subjective:      Patient ID: Kosta Knox is a [de-identified] y o  female  WF RTC for f/u HTN, HLD, etc  Doing ok and no new issues  Remains active w/o difficulty and no falls  Chronic pain is manageable  FARZAD/MDD are controlled  Labs are up to date  Due for vaccines  The following portions of the patient's history were reviewed and updated as appropriate:   She has a past medical history of Arthritis, Generalized anxiety disorder, Hypertension, and Vaginal prolapse ,  does not have any pertinent problems on file  ,   has a past surgical history that includes Colonoscopy; Hysterectomy; Bilateral oophorectomy; Bony pelvis surgery; Incontinence surgery; Bladder exstrophy closure; Eye surgery; and Cataract extraction  ,  family history includes Arthritis in her mother; Depression in her mother; Diabetes in her family and father; Heart disease in her brother and mother; No Known Problems in her daughter, daughter, maternal aunt, maternal grandmother, paternal aunt, paternal aunt, and paternal grandmother; Prostate cancer in her father; Stroke in her mother; Substance Abuse in her mother  ,   reports that she has never smoked  She has never used smokeless tobacco  She reports that she does not drink alcohol and does not use drugs  ,  is allergic to aspirin and procaine     Current Outpatient Medications   Medication Sig Dispense Refill   • Artificial Tear Ointment (DRY EYES OP) Administer 1 application to both eyes 2 (two) times a day     • Bioflavonoid Products (C COMPLEX PO) Take 1 tablet by mouth daily     • Calcium Carbonate-Vitamin D 600-125 MG-UNIT TABS Take 1 tablet by mouth 2 (two) times a day     • Coenzyme Q10 (CO Q-10) 200 MG CAPS Take by mouth     • Cranberry 425 MG CAPS Take by mouth     • docusate sodium (COLACE) 100 mg capsule Take 1 capsule by mouth     • Evening Primrose Oil 1000 MG CAPS Take by mouth     • Garlic 0599 MG CAPS Take by mouth     • Lutein 40 MG CAPS Take by mouth     • metoprolol succinate (TOPROL-XL) 50 mg 24 hr tablet TAKE 1 TABLET DAILY 90 tablet 3   • Multiple Vitamin (MULTI-VITAMIN DAILY PO) Take by mouth daily     • Omega-3 1000 MG CAPS Take 2 capsules by mouth 2 (two) times a day     • Turmeric 500 MG CAPS Take 1 capsule by mouth daily       No current facility-administered medications for this visit  Review of Systems   Constitutional: Negative for activity change, chills, diaphoresis, fatigue and fever  HENT: Negative  Eyes: Negative for visual disturbance  Respiratory: Negative for cough, chest tightness, shortness of breath and wheezing  Cardiovascular: Negative for chest pain, palpitations and leg swelling  Gastrointestinal: Negative for abdominal pain, constipation, diarrhea, nausea and vomiting     Endocrine: Negative for cold intolerance and heat intolerance  Genitourinary: Negative for difficulty urinating, dysuria and frequency  Musculoskeletal: Negative for arthralgias, gait problem and myalgias  Neurological: Negative for dizziness, seizures, syncope, weakness, light-headedness and headaches  Psychiatric/Behavioral: Negative for confusion, dysphoric mood and sleep disturbance  The patient is not nervous/anxious  PHQ-2/9 Depression Screening    Little interest or pleasure in doing things: 0 - not at all  Feeling down, depressed, or hopeless: 0 - not at all  Trouble falling or staying asleep, or sleeping too much: 0 - not at all  Feeling tired or having little energy: 0 - not at all  Poor appetite or overeatin - not at all  Feeling bad about yourself - or that you are a failure or have let yourself or your family down: 0 - not at all  Trouble concentrating on things, such as reading the newspaper or watching television: 0 - not at all  Moving or speaking so slowly that other people could have noticed  Or the opposite - being so fidgety or restless that you have been moving around a lot more than usual: 0 - not at all  Thoughts that you would be better off dead, or of hurting yourself in some way: 0 - not at all  PHQ-9 Score: 0   PHQ-9 Interpretation: No or Minimal depression         Objective:  Vitals:    23 0903   BP: 112/60   Pulse: 95   Temp: 97 5 °F (36 4 °C)   SpO2: 99%   Weight: 57 2 kg (126 lb)   Height: 5' 2" (1 575 m)     Body mass index is 23 05 kg/m²  Physical Exam  Vitals and nursing note reviewed  Constitutional:       General: She is not in acute distress  Appearance: Normal appearance  She is not ill-appearing  HENT:      Head: Normocephalic and atraumatic  Mouth/Throat:      Mouth: Mucous membranes are moist    Eyes:      Extraocular Movements: Extraocular movements intact        Conjunctiva/sclera: Conjunctivae normal       Pupils: Pupils are equal, round, and reactive to light  Neck:      Vascular: No carotid bruit  Cardiovascular:      Rate and Rhythm: Normal rate and regular rhythm  Heart sounds: Normal heart sounds  No murmur heard  Pulmonary:      Effort: Pulmonary effort is normal  No respiratory distress  Breath sounds: Normal breath sounds  No wheezing, rhonchi or rales  Abdominal:      General: Bowel sounds are normal  There is no distension  Palpations: Abdomen is soft  Tenderness: There is no abdominal tenderness  Musculoskeletal:      Cervical back: Neck supple  No tenderness  Right lower leg: No edema  Left lower leg: No edema  Neurological:      General: No focal deficit present  Mental Status: She is alert and oriented to person, place, and time  Mental status is at baseline  Psychiatric:         Mood and Affect: Mood normal          Behavior: Behavior normal          Thought Content:  Thought content normal          Judgment: Judgment normal

## 2023-02-06 NOTE — PROGRESS NOTES
Assessment and Plan:     Problem List Items Addressed This Visit        Cardiovascular and Mediastinum    Benign essential hypertension - Primary       Musculoskeletal and Integument    Osteopenia    Generalized osteoarthritis       Other    Mild major depression, single episode ([facility] )    Hyperlipidemia    Generalized anxiety disorder   Other Visit Diagnoses     Medicare annual wellness visit, subsequent        Encounter for vaccination        Relevant Orders    Pneumococcal Conjugate Vaccine 20-valent (Pcv20)           Preventive health issues were discussed with patient, and age appropriate screening tests were ordered as noted in patient's After Visit Summary  Personalized health advice and appropriate referrals for health education or preventive services given if needed, as noted in patient's After Visit Summary       History of Present Illness:     Patient presents for a Medicare Wellness Visit    HPI   Patient Care Team:  Jonny Perez DO as PCP - General (Internal Medicine)     Review of Systems:     Review of Systems     Problem List:     Patient Active Problem List   Diagnosis   • Allergic rhinitis   • Benign essential hypertension   • Dyspareunia, female   • Generalized anxiety disorder   • Generalized osteoarthritis   • Hyperlipidemia   • Osteopenia   • Palpitations   • Vaginal atrophy   • Mild major depression, single episode [facility])      Past Medical and Surgical History:     Past Medical History:   Diagnosis Date   • Arthritis    • Generalized anxiety disorder    • Hypertension    • Vaginal prolapse     last assessed: 11/13/2017     Past Surgical History:   Procedure Laterality Date   • BILATERAL OOPHORECTOMY      last assessed: 11/13/2017   • BLADDER EXSTROPHY CLOSURE      Repair of     • BONY PELVIS SURGERY      Posterior Colporrhaphy (for pelvic Relaxation) last assessed: 11/13/2017   • CATARACT EXTRACTION     • COLONOSCOPY      last assessed: 11/13/2017   • EYE SURGERY     • HYSTERECTOMY     • INCONTINENCE SURGERY      Removal/Revision of sling for stress incontinence  Family History:     Family History   Problem Relation Age of Onset   • Arthritis Mother    • Heart disease Mother    • Stroke Mother    • Depression Mother    • Substance Abuse Mother    • Diabetes Father    • Prostate cancer Father    • Heart disease Brother    • Diabetes Family    • No Known Problems Daughter    • No Known Problems Maternal Grandmother    • No Known Problems Paternal Grandmother    • No Known Problems Daughter    • No Known Problems Maternal Aunt    • No Known Problems Paternal Aunt    • No Known Problems Paternal Aunt       Social History:     Social History     Socioeconomic History   • Marital status: /Civil Union     Spouse name: None   • Number of children: 3   • Years of education: None   • Highest education level: None   Occupational History   • Occupation: retired     Comment: retired   Tobacco Use   • Smoking status: Never   • Smokeless tobacco: Never   Vaping Use   • Vaping Use: Never used   Substance and Sexual Activity   • Alcohol use: No   • Drug use: No   • Sexual activity: Not Currently     Birth control/protection: Post-menopausal   Other Topics Concern   • None   Social History Narrative    Lives with      Social Determinants of Health     Financial Resource Strain: Low Risk    • Difficulty of Paying Living Expenses: Not hard at all   Food Insecurity: Not on file   Transportation Needs: No Transportation Needs   • Lack of Transportation (Medical): No   • Lack of Transportation (Non-Medical):  No   Physical Activity: Not on file   Stress: Not on file   Social Connections: Not on file   Intimate Partner Violence: Not on file   Housing Stability: Not on file      Medications and Allergies:     Current Outpatient Medications   Medication Sig Dispense Refill   • Artificial Tear Ointment (DRY EYES OP) Administer 1 application to both eyes 2 (two) times a day     • Bioflavonoid Products (C COMPLEX PO) Take 1 tablet by mouth daily     • Calcium Carbonate-Vitamin D 600-125 MG-UNIT TABS Take 1 tablet by mouth 2 (two) times a day     • Coenzyme Q10 (CO Q-10) 200 MG CAPS Take by mouth     • Cranberry 425 MG CAPS Take by mouth     • docusate sodium (COLACE) 100 mg capsule Take 1 capsule by mouth     • Evening Primrose Oil 1000 MG CAPS Take by mouth     • Garlic 6974 MG CAPS Take by mouth     • Lutein 40 MG CAPS Take by mouth     • metoprolol succinate (TOPROL-XL) 50 mg 24 hr tablet TAKE 1 TABLET DAILY 90 tablet 3   • Multiple Vitamin (MULTI-VITAMIN DAILY PO) Take by mouth daily     • Omega-3 1000 MG CAPS Take 2 capsules by mouth 2 (two) times a day     • Turmeric 500 MG CAPS Take 1 capsule by mouth daily       No current facility-administered medications for this visit  Allergies   Allergen Reactions   • Aspirin    • Procaine       Immunizations:     Immunization History   Administered Date(s) Administered   • COVID-19 MODERNA VACC 0 5 ML IM 01/25/2021, 02/22/2021, 12/14/2021   • INFLUENZA 01/28/2022   • Influenza, high dose seasonal 0 7 mL 12/02/2019, 10/13/2020, 01/28/2022, 10/06/2022   • Pneumococcal 01/24/2012   • Pneumococcal Conjugate 13-Valent 11/13/2017   • Pneumococcal Conjugate PCV 7 01/24/2012      Health Maintenance:         Topic Date Due   • Hepatitis C Screening  Completed         Topic Date Due   • Pneumococcal Vaccine: 65+ Years (2 - PPSV23 if available, else PCV20) 11/13/2018   • COVID-19 Vaccine (4 - Booster for Joel Rubin series) 02/08/2022      Medicare Screening Tests and Risk Assessments:     Alma Dupont is here for her Subsequent Wellness visit  Last Medicare Wellness visit information reviewed, patient interviewed and updates made to the record today  Health Risk Assessment:   Patient rates overall health as good  Patient feels that their physical health rating is same  Patient is satisfied with their life  Eyesight was rated as same  Hearing was rated as same   Patient feels that their emotional and mental health rating is same  Patients states they are never, rarely angry  Patient states they are never, rarely unusually tired/fatigued  Pain experienced in the last 7 days has been none  Patient states that she has experienced no weight loss or gain in last 6 months  Depression Screening:   PHQ-9 Score: 0      Fall Risk Screening: In the past year, patient has experienced: no history of falling in past year      Urinary Incontinence Screening:   Patient has not leaked urine accidently in the last six months  Home Safety:  Patient does not have trouble with stairs inside or outside of their home  Patient has working smoke alarms and has working carbon monoxide detector  Home safety hazards include: none  Nutrition:   Current diet is Regular  Medications:   Patient is currently taking over-the-counter supplements  OTC medications include: see medication list  Patient is able to manage medications  Activities of Daily Living (ADLs)/Instrumental Activities of Daily Living (IADLs):   Walk and transfer into and out of bed and chair?: Yes  Dress and groom yourself?: Yes    Bathe or shower yourself?: Yes    Feed yourself? Yes  Do your laundry/housekeeping?: Yes  Manage your money, pay your bills and track your expenses?: Yes  Make your own meals?: Yes    Do your own shopping?: Yes    Previous Hospitalizations:   Any hospitalizations or ED visits within the last 12 months?: No      Advance Care Planning:   Living will: Yes    Durable POA for healthcare:  Yes    Advanced directive: Yes    Advanced directive counseling given: Yes    Five wishes given: No    Patient declined ACP directive: No    End of Life Decisions reviewed with patient: Yes    Provider agrees with end of life decisions: Yes      Cognitive Screening:   Provider or family/friend/caregiver concerned regarding cognition?: No    PREVENTIVE SCREENINGS      Cardiovascular Screening:    General: Screening Not Indicated, History Lipid Disorder and Screening Current      Diabetes Screening:     General: Screening Current      Breast Cancer Screening:     General: Screening Current and Screening Not Indicated      Cervical Cancer Screening:    General: Screening Not Indicated      Osteoporosis Screening:    General: Screening Current      Abdominal Aortic Aneurysm (AAA) Screening:        General: Screening Not Indicated      Lung Cancer Screening:     General: Screening Not Indicated      Hepatitis C Screening:    General: Screening Current    Screening, Brief Intervention, and Referral to Treatment (SBIRT)    Screening  Typical number of drinks in a day: 0  Typical number of drinks in a week: 0  Interpretation: Low risk drinking behavior  Single Item Drug Screening:  How often have you used an illegal drug (including marijuana) or a prescription medication for non-medical reasons in the past year? never    Single Item Drug Screen Score: 0  Interpretation: Negative screen for possible drug use disorder    Other Counseling Topics:   Car/seat belt/driving safety, sunscreen and calcium and vitamin D intake and regular weightbearing exercise  No results found  Physical Exam:     /60   Pulse 95   Temp 97 5 °F (36 4 °C)   Ht 5' 2" (1 575 m)   Wt 57 2 kg (126 lb)   SpO2 99%   BMI 23 05 kg/m²     Physical Exam   A/P: Doing well and no falls reported  Denies depression and feels safe at home  Diverse diet  No problems operating a MV and uses seat belts  Has a living will and POA  No DME or referrals needed today  RTC one year for medicare wellness       Deisy eGe DO

## 2023-02-06 NOTE — PATIENT INSTRUCTIONS
Medicare Preventive Visit Patient Instructions  Thank you for completing your Welcome to Medicare Visit or Medicare Annual Wellness Visit today  Your next wellness visit will be due in one year (2/7/2024)  The screening/preventive services that you may require over the next 5-10 years are detailed below  Some tests may not apply to you based off risk factors and/or age  Screening tests ordered at today's visit but not completed yet may show as past due  Also, please note that scanned in results may not display below  Preventive Screenings:  Service Recommendations Previous Testing/Comments   Colorectal Cancer Screening  * Colonoscopy    * Fecal Occult Blood Test (FOBT)/Fecal Immunochemical Test (FIT)  * Fecal DNA/Cologuard Test  * Flexible Sigmoidoscopy Age: 39-70 years old   Colonoscopy: every 10 years (may be performed more frequently if at higher risk)  OR  FOBT/FIT: every 1 year  OR  Cologuard: every 3 years  OR  Sigmoidoscopy: every 5 years  Screening may be recommended earlier than age 39 if at higher risk for colorectal cancer  Also, an individualized decision between you and your healthcare provider will decide whether screening between the ages of 74-80 would be appropriate  Colonoscopy: Not on file  FOBT/FIT: Not on file  Cologuard: Not on file  Sigmoidoscopy: Not on file          Breast Cancer Screening Age: 36 years old  Frequency: every 1-2 years  Not required if history of left and right mastectomy Mammogram: 05/03/2021    Screening Current   Cervical Cancer Screening Between the ages of 21-29, pap smear recommended once every 3 years  Between the ages of 33-67, can perform pap smear with HPV co-testing every 5 years     Recommendations may differ for women with a history of total hysterectomy, cervical cancer, or abnormal pap smears in past  Pap Smear: Not on file    Screening Not Indicated   Hepatitis C Screening Once for adults born between 1945 and 1965  More frequently in patients at high risk for Hepatitis C Hep C Antibody: 09/28/2021    Screening Current   Diabetes Screening 1-2 times per year if you're at risk for diabetes or have pre-diabetes Fasting glucose: 104 mg/dL (10/6/2022)  A1C: 5 6 % (10/6/2022)  Screening Current   Cholesterol Screening Once every 5 years if you don't have a lipid disorder  May order more often based on risk factors  Lipid panel: 10/06/2022    Screening Not Indicated  History Lipid Disorder     Other Preventive Screenings Covered by Medicare:  1  Abdominal Aortic Aneurysm (AAA) Screening: covered once if your at risk  You're considered to be at risk if you have a family history of AAA  2  Lung Cancer Screening: covers low dose CT scan once per year if you meet all of the following conditions: (1) Age 50-69; (2) No signs or symptoms of lung cancer; (3) Current smoker or have quit smoking within the last 15 years; (4) You have a tobacco smoking history of at least 20 pack years (packs per day multiplied by number of years you smoked); (5) You get a written order from a healthcare provider  3  Glaucoma Screening: covered annually if you're considered high risk: (1) You have diabetes OR (2) Family history of glaucoma OR (3)  aged 48 and older OR (3)  American aged 72 and older  3  Osteoporosis Screening: covered every 2 years if you meet one of the following conditions: (1) You're estrogen deficient and at risk for osteoporosis based off medical history and other findings; (2) Have a vertebral abnormality; (3) On glucocorticoid therapy for more than 3 months; (4) Have primary hyperparathyroidism; (5) On osteoporosis medications and need to assess response to drug therapy  · Last bone density test (DXA Scan): 05/03/2021   5  HIV Screening: covered annually if you're between the age of 15-65  Also covered annually if you are younger than 13 and older than 72 with risk factors for HIV infection   For pregnant patients, it is covered up to 3 times per pregnancy  Immunizations:  Immunization Recommendations   Influenza Vaccine Annual influenza vaccination during flu season is recommended for all persons aged >= 6 months who do not have contraindications   Pneumococcal Vaccine   * Pneumococcal conjugate vaccine = PCV13 (Prevnar 13), PCV15 (Vaxneuvance), PCV20 (Prevnar 20)  * Pneumococcal polysaccharide vaccine = PPSV23 (Pneumovax) Adults 25-60 years old: 1-3 doses may be recommended based on certain risk factors  Adults 72 years old: 1-2 doses may be recommended based off what pneumonia vaccine you previously received   Hepatitis B Vaccine 3 dose series if at intermediate or high risk (ex: diabetes, end stage renal disease, liver disease)   Tetanus (Td) Vaccine - COST NOT COVERED BY MEDICARE PART B Following completion of primary series, a booster dose should be given every 10 years to maintain immunity against tetanus  Td may also be given as tetanus wound prophylaxis  Tdap Vaccine - COST NOT COVERED BY MEDICARE PART B Recommended at least once for all adults  For pregnant patients, recommended with each pregnancy  Shingles Vaccine (Shingrix) - COST NOT COVERED BY MEDICARE PART B  2 shot series recommended in those aged 48 and above     Health Maintenance Due:      Topic Date Due   • Hepatitis C Screening  Completed     Immunizations Due:      Topic Date Due   • Pneumococcal Vaccine: 65+ Years (2 - PPSV23 if available, else PCV20) 11/13/2018   • COVID-19 Vaccine (4 - Booster for [place] series) 02/08/2022     Advance Directives   What are advance directives? Advance directives are legal documents that state your wishes and plans for medical care  These plans are made ahead of time in case you lose your ability to make decisions for yourself  Advance directives can apply to any medical decision, such as the treatments you want, and if you want to donate organs  What are the types of advance directives?   There are many types of advance directives, and each state has rules about how to use them  You may choose a combination of any of the following:  · Living will: This is a written record of the treatment you want  You can also choose which treatments you do not want, which to limit, and which to stop at a certain time  This includes surgery, medicine, IV fluid, and tube feedings  · Durable power of  for healthcare [facility]): This is a written record that states who you want to make healthcare choices for you when you are unable to make them for yourself  This person, called a proxy, is usually a family member or a friend  You may choose more than 1 proxy  · Do not resuscitate (DNR) order:  A DNR order is used in case your heart stops beating or you stop breathing  It is a request not to have certain forms of treatment, such as CPR  A DNR order may be included in other types of advance directives  · Medical directive: This covers the care that you want if you are in a coma, near death, or unable to make decisions for yourself  You can list the treatments you want for each condition  Treatment may include pain medicine, surgery, blood transfusions, dialysis, IV or tube feedings, and a ventilator (breathing machine)  · Values history: This document has questions about your views, beliefs, and how you feel and think about life  This information can help others choose the care that you would choose  Why are advance directives important? An advance directive helps you control your care  Although spoken wishes may be used, it is better to have your wishes written down  Spoken wishes can be misunderstood, or not followed  Treatments may be given even if you do not want them  An advance directive may make it easier for your family to make difficult choices about your care  © Copyright [employer] 2018 Information is for End User's use only and may not be sold, redistributed or otherwise used for commercial purposes   All illustrations and images included in [facility] [address] are the copyrighted property of A D A M [employer]  or [facility] [place] [facility] [place]

## 2023-02-23 ENCOUNTER — VBI (OUTPATIENT)
Dept: ADMINISTRATIVE | Facility: OTHER | Age: 83
End: 2023-02-23

## 2023-03-23 ENCOUNTER — TELEPHONE (OUTPATIENT)
Dept: INTERNAL MEDICINE CLINIC | Facility: CLINIC | Age: 83
End: 2023-03-23

## 2023-03-23 NOTE — TELEPHONE ENCOUNTER
Pt called regarding the next booster  Can pt get this if she has sniffles and slight sore throat also just received Pneumonia vaccine 1 month ago? Please advise

## 2023-08-03 ENCOUNTER — RA CDI HCC (OUTPATIENT)
Dept: OTHER | Facility: HOSPITAL | Age: 83
End: 2023-08-03

## 2023-08-09 ENCOUNTER — OFFICE VISIT (OUTPATIENT)
Dept: INTERNAL MEDICINE CLINIC | Facility: CLINIC | Age: 83
End: 2023-08-09
Payer: MEDICARE

## 2023-08-09 ENCOUNTER — APPOINTMENT (OUTPATIENT)
Dept: LAB | Facility: CLINIC | Age: 83
End: 2023-08-09
Payer: MEDICARE

## 2023-08-09 ENCOUNTER — APPOINTMENT (OUTPATIENT)
Dept: RADIOLOGY | Facility: CLINIC | Age: 83
End: 2023-08-09
Payer: MEDICARE

## 2023-08-09 VITALS
HEART RATE: 63 BPM | WEIGHT: 121.25 LBS | SYSTOLIC BLOOD PRESSURE: 126 MMHG | HEIGHT: 62 IN | TEMPERATURE: 99.4 F | BODY MASS INDEX: 22.31 KG/M2 | DIASTOLIC BLOOD PRESSURE: 62 MMHG | OXYGEN SATURATION: 98 %

## 2023-08-09 DIAGNOSIS — M54.41 CHRONIC MIDLINE LOW BACK PAIN WITH BILATERAL SCIATICA: ICD-10-CM

## 2023-08-09 DIAGNOSIS — M54.42 CHRONIC MIDLINE LOW BACK PAIN WITH BILATERAL SCIATICA: ICD-10-CM

## 2023-08-09 DIAGNOSIS — E78.2 MIXED HYPERLIPIDEMIA: ICD-10-CM

## 2023-08-09 DIAGNOSIS — G89.29 CHRONIC MIDLINE LOW BACK PAIN WITH BILATERAL SCIATICA: ICD-10-CM

## 2023-08-09 DIAGNOSIS — M79.10 MYALGIA: ICD-10-CM

## 2023-08-09 DIAGNOSIS — M60.9 MYOSITIS, UNSPECIFIED MYOSITIS TYPE, UNSPECIFIED SITE: ICD-10-CM

## 2023-08-09 DIAGNOSIS — G25.81 RESTLESS LEG SYNDROME: ICD-10-CM

## 2023-08-09 DIAGNOSIS — R20.2 PARESTHESIA OF BOTH LOWER EXTREMITIES: ICD-10-CM

## 2023-08-09 DIAGNOSIS — F32.0 MILD MAJOR DEPRESSION, SINGLE EPISODE (HCC): ICD-10-CM

## 2023-08-09 DIAGNOSIS — I10 BENIGN ESSENTIAL HYPERTENSION: ICD-10-CM

## 2023-08-09 DIAGNOSIS — R79.9 ABNORMAL FINDING OF BLOOD CHEMISTRY, UNSPECIFIED: ICD-10-CM

## 2023-08-09 DIAGNOSIS — I10 BENIGN ESSENTIAL HYPERTENSION: Primary | ICD-10-CM

## 2023-08-09 DIAGNOSIS — M15.9 GENERALIZED OSTEOARTHRITIS: ICD-10-CM

## 2023-08-09 DIAGNOSIS — R26.9 GAIT ABNORMALITY: ICD-10-CM

## 2023-08-09 DIAGNOSIS — M85.80 OSTEOPENIA, UNSPECIFIED LOCATION: ICD-10-CM

## 2023-08-09 DIAGNOSIS — F41.1 GENERALIZED ANXIETY DISORDER: ICD-10-CM

## 2023-08-09 LAB
25(OH)D3 SERPL-MCNC: 86.8 NG/ML (ref 30–100)
ALBUMIN SERPL BCP-MCNC: 4 G/DL (ref 3.5–5)
ALP SERPL-CCNC: 66 U/L (ref 46–116)
ALT SERPL W P-5'-P-CCNC: 26 U/L (ref 12–78)
ANA SER QL IA: NEGATIVE
ANION GAP SERPL CALCULATED.3IONS-SCNC: 6 MMOL/L
AST SERPL W P-5'-P-CCNC: 21 U/L (ref 5–45)
B BURGDOR IGG+IGM SER QL IA: NEGATIVE
BASOPHILS # BLD AUTO: 0.07 THOUSANDS/ÂΜL (ref 0–0.1)
BASOPHILS NFR BLD AUTO: 1 % (ref 0–1)
BILIRUB SERPL-MCNC: 0.53 MG/DL (ref 0.2–1)
BUN SERPL-MCNC: 11 MG/DL (ref 5–25)
CALCIUM SERPL-MCNC: 9.6 MG/DL (ref 8.3–10.1)
CHLORIDE SERPL-SCNC: 101 MMOL/L (ref 96–108)
CO2 SERPL-SCNC: 30 MMOL/L (ref 21–32)
CREAT SERPL-MCNC: 0.89 MG/DL (ref 0.6–1.3)
CRP SERPL QL: <3 MG/L
EOSINOPHIL # BLD AUTO: 0.12 THOUSAND/ÂΜL (ref 0–0.61)
EOSINOPHIL NFR BLD AUTO: 2 % (ref 0–6)
ERYTHROCYTE [DISTWIDTH] IN BLOOD BY AUTOMATED COUNT: 12.1 % (ref 11.6–15.1)
FOLATE SERPL-MCNC: >22.3 NG/ML
GFR SERPL CREATININE-BSD FRML MDRD: 60 ML/MIN/1.73SQ M
GLUCOSE P FAST SERPL-MCNC: 100 MG/DL (ref 65–99)
HCT VFR BLD AUTO: 43.2 % (ref 34.8–46.1)
HGB BLD-MCNC: 15.1 G/DL (ref 11.5–15.4)
IMM GRANULOCYTES # BLD AUTO: 0.02 THOUSAND/UL (ref 0–0.2)
IMM GRANULOCYTES NFR BLD AUTO: 0 % (ref 0–2)
LDLC SERPL DIRECT ASSAY-MCNC: 121 MG/DL (ref 0–100)
LYMPHOCYTES # BLD AUTO: 1.7 THOUSANDS/ÂΜL (ref 0.6–4.47)
LYMPHOCYTES NFR BLD AUTO: 30 % (ref 14–44)
MAGNESIUM SERPL-MCNC: 2.3 MG/DL (ref 1.6–2.6)
MCH RBC QN AUTO: 30.4 PG (ref 26.8–34.3)
MCHC RBC AUTO-ENTMCNC: 35 G/DL (ref 31.4–37.4)
MCV RBC AUTO: 87 FL (ref 82–98)
MONOCYTES # BLD AUTO: 0.42 THOUSAND/ÂΜL (ref 0.17–1.22)
MONOCYTES NFR BLD AUTO: 7 % (ref 4–12)
NEUTROPHILS # BLD AUTO: 3.31 THOUSANDS/ÂΜL (ref 1.85–7.62)
NEUTS SEG NFR BLD AUTO: 60 % (ref 43–75)
NRBC BLD AUTO-RTO: 0 /100 WBCS
PLATELET # BLD AUTO: 306 THOUSANDS/UL (ref 149–390)
PMV BLD AUTO: 9.7 FL (ref 8.9–12.7)
POTASSIUM SERPL-SCNC: 3.7 MMOL/L (ref 3.5–5.3)
PROT SERPL-MCNC: 7.8 G/DL (ref 6.4–8.4)
RBC # BLD AUTO: 4.97 MILLION/UL (ref 3.81–5.12)
SODIUM SERPL-SCNC: 137 MMOL/L (ref 135–147)
TRIGL SERPL-MCNC: 111 MG/DL
TSH SERPL DL<=0.05 MIU/L-ACNC: 0.88 UIU/ML (ref 0.45–4.5)
VIT B12 SERPL-MCNC: 883 PG/ML (ref 180–914)
WBC # BLD AUTO: 5.64 THOUSAND/UL (ref 4.31–10.16)

## 2023-08-09 PROCEDURE — 82306 VITAMIN D 25 HYDROXY: CPT

## 2023-08-09 PROCEDURE — 83721 ASSAY OF BLOOD LIPOPROTEIN: CPT

## 2023-08-09 PROCEDURE — 84478 ASSAY OF TRIGLYCERIDES: CPT

## 2023-08-09 PROCEDURE — 99214 OFFICE O/P EST MOD 30 MIN: CPT | Performed by: INTERNAL MEDICINE

## 2023-08-09 PROCEDURE — 83036 HEMOGLOBIN GLYCOSYLATED A1C: CPT

## 2023-08-09 PROCEDURE — 86038 ANTINUCLEAR ANTIBODIES: CPT

## 2023-08-09 PROCEDURE — 72110 X-RAY EXAM L-2 SPINE 4/>VWS: CPT

## 2023-08-09 PROCEDURE — 82607 VITAMIN B-12: CPT

## 2023-08-09 PROCEDURE — 86140 C-REACTIVE PROTEIN: CPT

## 2023-08-09 PROCEDURE — 82746 ASSAY OF FOLIC ACID SERUM: CPT

## 2023-08-09 PROCEDURE — 80053 COMPREHEN METABOLIC PANEL: CPT

## 2023-08-09 PROCEDURE — 36415 COLL VENOUS BLD VENIPUNCTURE: CPT

## 2023-08-09 PROCEDURE — 86618 LYME DISEASE ANTIBODY: CPT

## 2023-08-09 PROCEDURE — 83735 ASSAY OF MAGNESIUM: CPT

## 2023-08-09 PROCEDURE — 86430 RHEUMATOID FACTOR TEST QUAL: CPT

## 2023-08-09 PROCEDURE — 85025 COMPLETE CBC W/AUTO DIFF WBC: CPT

## 2023-08-09 PROCEDURE — 84443 ASSAY THYROID STIM HORMONE: CPT

## 2023-08-09 NOTE — PATIENT INSTRUCTIONS
Chronic Hypertension   AMBULATORY CARE:   Hypertension is considered chronic  when it continues for 3 months or longer. Hypertension that continues causes your heart to work much harder than normal, which may lead to heart damage. Even if you have hypertension for years, lifestyle changes, medicines, or both may help lower your blood pressure. Call your local emergency number [phone] in the [address]) or have someone call if:   You have chest pain. You have any of the following signs of a heart attack:      Squeezing, pressure, or pain in your chest    You may  also have any of the following:     Discomfort or pain in your back, neck, jaw, stomach, or arm    Shortness of breath    Nausea or vomiting    Lightheadedness or a sudden cold sweat    You become confused or have difficulty speaking. You suddenly feel lightheaded or have trouble breathing. Seek care immediately if:   You have a severe headache or vision loss. You have weakness in an arm or leg. Call your doctor or cardiologist if:   You feel faint, dizzy, confused, or drowsy. You have been taking your blood pressure medicine but your pressure is higher than your provider says it should be. You have questions or concerns about your condition or care. Treatment for chronic hypertension  may include medicine to lower your blood pressure and cholesterol levels. A low cholesterol level helps prevent heart disease and makes it easier to control your blood pressure. Heart disease can make your blood pressure harder to control. You may also need to make lifestyle changes. What you need to know about the stages of hypertension:  Your healthcare provider will give you a blood pressure goal based on your age, health, and risk for cardiovascular disease. The following are general guidelines on the stages of hypertension:  Normal blood pressure is 119/79 or lower .  Your provider may only check your blood pressure each year if it stays at a normal level.    Elevated blood pressure is 120/79 to 129/79 . This is sometimes called prehypertension. Your provider may suggest lifestyle changes to help lower your blood pressure to a normal level. He or she may then check it again in 3 to 6 months. Stage 1 hypertension is 130/80  to 139/89 . Your provider may recommend lifestyle changes, medication, and checks every 3 to 6 months until your blood pressure is controlled. Stage 2 hypertension is 140/90 or higher . Your provider will recommend lifestyle changes and have you take 2 kinds of hypertension medicines. You will also need to have your blood pressure checked monthly until it is controlled. Manage chronic hypertension:   Check your blood pressure at home. Do not smoke, have caffeine, or exercise for at least 30 minutes before you check your blood pressure. Sit and rest for 5 minutes before you check your blood pressure. Extend your arm and support it on a flat surface. Your arm should be at the same level as your heart. Follow the directions that came with your blood pressure monitor. Check your blood pressure 2 times, 1 minute apart, before you take your medicine in the morning. Also check your blood pressure before your evening meal. Keep a record of your readings and bring it to your follow-up visits. Your healthcare provider may use the readings to make changes to your treatment plan. Manage any other health conditions you have. Health conditions such as diabetes can increase your risk for hypertension. Follow your provider's instructions and take all your medicines as directed. Talk to your provider about any new health conditions you have recently developed. Ask about all medicines. Certain medicines can increase your blood pressure. Examples include oral birth control pills, decongestants, herbal supplements, and NSAIDs, such as ibuprofen. Your provider can tell you which medicines are safe for you to take.  This includes prescription and over-the-counter medicines. Lifestyle changes you can make to lower your blood pressure: Your provider may want you to make more lifestyle changes if you are having trouble controlling your blood pressure. This may feel difficult over time, especially if you think you are making good changes but your pressure is still high. It might help to focus on one new change at a time. For example, try to add 1 more day of exercise, or exercise for an extra 10 minutes on 2 days. Small changes can make a big difference. Your healthcare provider can also refer you to specialists such as a dietitian who can help you make small changes. Your family members may be included in helping you learn to create lifestyle changes, such as the following:     Limit sodium (salt) as directed. Too much sodium can affect your fluid balance. Check labels to find low-sodium or no-salt-added foods. Some low-sodium foods use potassium salts for flavor. Too much potassium can also cause health problems. Your provider will tell you how much sodium and potassium are safe for you to have in a day. He or she may recommend that you limit sodium to 2,300 mg a day. Follow the meal plan recommended by your provider. A dietitian or your provider can give you more information on low-sodium plans or the DASH (Dietary Approaches to Stop Hypertension) eating plan. The DASH plan is low in sodium, processed sugar, unhealthy fats, and total fat. It is high in potassium, calcium, and fiber. These can be found in vegetables, fruit, and whole-grain foods. Be physically active throughout the day. Physical activity, such as exercise, can help control your blood pressure and your weight. Be physically active for at least 30 minutes per day, on most days of the week. Include aerobic activity, such as walking or riding a bicycle. Also include strength training at least 2 times each week.  Your provider can help you create a physical activity plan. Decrease stress. This may help lower your blood pressure. Learn ways to relax, such as deep breathing or listening to music. Limit alcohol as directed. Alcohol can increase your blood pressure. A drink of alcohol is 12 ounces of beer, 5 ounces of wine, or 1½ ounces of liquor. Your provider can help you set daily and weekly drink limits. He or she may recommend no alcohol if your blood pressure stays higher than goal even with medicine or other measures. Ask your provider for information if you need help to quit. Do not smoke. Nicotine and other chemicals in cigarettes and cigars can increase your blood pressure and also cause lung damage. Ask your provider for information if you currently smoke and need help to quit. E-cigarettes or smokeless tobacco still contain nicotine. Talk to your provider before you use these products. Follow up with your doctor or cardiologist as directed: You will need to return to have your blood pressure checked and to have other lab tests done. Write down your questions so you remember to ask them during your visits. © Copyright [employer] 2022 Information is for End User's use only and may not be sold, redistributed or otherwise used for commercial purposes. The above information is an  only. It is not intended as medical advice for individual conditions or treatments. Talk to your doctor, nurse or pharmacist before following any medical regimen to see if it is safe and effective for you.

## 2023-08-09 NOTE — PROGRESS NOTES
Assessment/Plan:  Problem List Items Addressed This Visit        Cardiovascular and Mediastinum    Benign essential hypertension - Primary    Relevant Orders    Comprehensive metabolic panel       Musculoskeletal and Integument    Osteopenia    Generalized osteoarthritis       Other    Mild major depression, single episode (HCC)    Hyperlipidemia    Relevant Orders    Comprehensive metabolic panel    LDL cholesterol, direct    Triglycerides    Generalized anxiety disorder   Other Visit Diagnoses     Restless leg syndrome        Gait abnormality        Relevant Orders    Ambulatory referral to Physical Therapy    Myalgia        Relevant Orders    RAFFAELE w/Reflex    CBC and differential    Folate    Hemoglobin A1C    C-reactive protein    Lyme Total AB W Reflex to IGM/IGG    Magnesium    Vitamin B12    Vitamin D 25 hydroxy    TSH, 3rd generation with Free T4 reflex    RF Screen w/ Reflex to Titer    Ambulatory referral to Physical Therapy    XR spine lumbar minimum 4 views non injury    Paresthesia of both lower extremities        Relevant Orders    RAFFAELE w/Reflex    CBC and differential    Folate    Hemoglobin A1C    C-reactive protein    Lyme Total AB W Reflex to IGM/IGG    Magnesium    Vitamin B12    Vitamin D 25 hydroxy    TSH, 3rd generation with Free T4 reflex    RF Screen w/ Reflex to Titer    Ambulatory referral to Physical Therapy    XR spine lumbar minimum 4 views non injury    Chronic midline low back pain with bilateral sciatica        Relevant Orders    Ambulatory referral to Physical Therapy    XR spine lumbar minimum 4 views non injury    Abnormal finding of blood chemistry, unspecified        Relevant Orders    Hemoglobin A1C    Myositis, unspecified myositis type, unspecified site        Relevant Orders    Vitamin D 25 hydroxy           Diagnoses and all orders for this visit:    Benign essential hypertension  -     Comprehensive metabolic panel;  Future    Osteopenia, unspecified location    Generalized osteoarthritis    Generalized anxiety disorder    Mixed hyperlipidemia  -     Comprehensive metabolic panel; Future  -     LDL cholesterol, direct; Future  -     Triglycerides; Future    Mild major depression, single episode (HCC)    Restless leg syndrome    Gait abnormality  -     Ambulatory referral to Physical Therapy; Future    Myalgia  -     RAFFAELE w/Reflex; Future  -     CBC and differential; Future  -     Folate; Future  -     Hemoglobin A1C; Future  -     C-reactive protein; Future  -     Lyme Total AB W Reflex to IGM/IGG; Future  -     Magnesium; Future  -     Vitamin B12; Future  -     Vitamin D 25 hydroxy; Future  -     TSH, 3rd generation with Free T4 reflex; Future  -     RF Screen w/ Reflex to Titer; Future  -     Ambulatory referral to Physical Therapy; Future  -     XR spine lumbar minimum 4 views non injury; Future    Paresthesia of both lower extremities  -     RAFFAELE w/Reflex; Future  -     CBC and differential; Future  -     Folate; Future  -     Hemoglobin A1C; Future  -     C-reactive protein; Future  -     Lyme Total AB W Reflex to IGM/IGG; Future  -     Magnesium; Future  -     Vitamin B12; Future  -     Vitamin D 25 hydroxy; Future  -     TSH, 3rd generation with Free T4 reflex; Future  -     RF Screen w/ Reflex to Titer; Future  -     Ambulatory referral to Physical Therapy; Future  -     XR spine lumbar minimum 4 views non injury; Future    Chronic midline low back pain with bilateral sciatica  -     Ambulatory referral to Physical Therapy; Future  -     XR spine lumbar minimum 4 views non injury; Future    Abnormal finding of blood chemistry, unspecified  -     Hemoglobin A1C; Future    Myositis, unspecified myositis type, unspecified site  -     Vitamin D 25 hydroxy; Future        No problem-specific Assessment & Plan notes found for this encounter. A/P: Doing ok and will check labs. ?cause of her gait issues, muscle pain, and LBP. PE not overly impressive.  ?neurogenic claudication vs CTD vs lyme vs radiculopathy. Doubt PAD as pulses ok and hx not consistent. Will check labs and xray. Start PT and daily tylenol. Continue current treatment and RTC one month for f/u labs, xrays, PT, back pain, paresthesia, and muscle issues. Subjective:      Patient ID: Lissa Chao is a 82 y.o. female. WF RTC for f/u HTN, DJD, etc. Doing ok and c/o difficulty walking with muscle aches, especially the thighs, tingling down both legs, legs restless and some LBP. No falls or trauma. No prior issues. Difficulty with ADL's and affecting QOL. No swelling or redness of the joints. No saddle anesthesia. No change in bowel or bladder habits. Works outside a lot. No tick bites. . Remains active w/o difficulty and no falls. Chronic pain is manageable. MDD/FARZAD are controlled. Due for labs. The following portions of the patient's history were reviewed and updated as appropriate:   She has a past medical history of Arthritis, Generalized anxiety disorder, Hypertension, and Vaginal prolapse.,  does not have any pertinent problems on file. ,   has a past surgical history that includes Colonoscopy; Hysterectomy; Bilateral oophorectomy; Bony pelvis surgery; Incontinence surgery; Bladder exstrophy closure; Eye surgery; and Cataract extraction. ,  family history includes Arthritis in her mother; Depression in her mother; Diabetes in her family and father; Heart disease in her brother and mother; No Known Problems in her daughter, daughter, maternal aunt, maternal grandmother, paternal aunt, paternal aunt, and paternal grandmother; Prostate cancer in her father; Stroke in her mother; Substance Abuse in her mother. ,   reports that she has never smoked. She has never been exposed to tobacco smoke. She has never used smokeless tobacco. She reports that she does not drink alcohol and does not use drugs. ,  is allergic to aspirin and procaine. .  Current Outpatient Medications   Medication Sig Dispense Refill   • Bioflavonoid Products (C COMPLEX PO) Take 1 tablet by mouth daily     • Calcium Carbonate-Vitamin D 600-125 MG-UNIT TABS Take 1 tablet by mouth 2 (two) times a day     • Coenzyme Q10 (CO Q-10) 200 MG CAPS Take by mouth     • Cranberry 425 MG CAPS Take by mouth     • docusate sodium (COLACE) 100 mg capsule Take 1 capsule by mouth     • Evening Primrose Oil 1000 MG CAPS Take by mouth     • Garlic 6719 MG CAPS Take by mouth     • Lutein 40 MG CAPS Take by mouth     • metoprolol succinate (TOPROL-XL) 50 mg 24 hr tablet TAKE 1 TABLET DAILY 90 tablet 3   • Multiple Vitamin (MULTI-VITAMIN DAILY PO) Take by mouth daily     • Omega-3 1000 MG CAPS Take 2 capsules by mouth 2 (two) times a day     • Turmeric 500 MG CAPS Take 1 capsule by mouth daily     • Artificial Tear Ointment (DRY EYES OP) Administer 1 application to both eyes 2 (two) times a day       No current facility-administered medications for this visit. Review of Systems   Constitutional: Negative for activity change, chills, diaphoresis, fatigue and fever. HENT: Negative. Eyes: Negative for visual disturbance. Respiratory: Negative for cough, chest tightness, shortness of breath and wheezing. Cardiovascular: Negative for chest pain, palpitations and leg swelling. Gastrointestinal: Negative for abdominal pain, constipation, diarrhea, nausea and vomiting. Endocrine: Negative for cold intolerance and heat intolerance. Genitourinary: Negative for difficulty urinating, dysuria and frequency. Musculoskeletal: Positive for arthralgias, back pain, gait problem and myalgias. Negative for joint swelling. Neurological: Negative for dizziness, seizures, syncope, weakness, light-headedness and headaches. Psychiatric/Behavioral: Positive for sleep disturbance. Negative for confusion and dysphoric mood. The patient is not nervous/anxious.         PHQ-2/9 Depression Screening    Little interest or pleasure in doing things: 0 - not at all  Feeling down, depressed, or hopeless: 0 - not at all  Trouble falling or staying asleep, or sleeping too much: 0 - not at all  Feeling tired or having little energy: 0 - not at all  Poor appetite or overeatin - not at all  Feeling bad about yourself - or that you are a failure or have let yourself or your family down: 0 - not at all  Trouble concentrating on things, such as reading the newspaper or watching television: 0 - not at all  Moving or speaking so slowly that other people could have noticed. Or the opposite - being so fidgety or restless that you have been moving around a lot more than usual: 0 - not at all  Thoughts that you would be better off dead, or of hurting yourself in some way: 0 - not at all  PHQ-9 Score: 0   PHQ-9 Interpretation: No or Minimal depression         Objective:  Vitals:    23 0922   BP: 126/62   Pulse: 63   Temp: 99.4 °F (37.4 °C)   SpO2: 98%   Weight: 55 kg (121 lb 4 oz)   Height: 5' 2" (1.575 m)     Body mass index is 22.18 kg/m². Physical Exam  Vitals and nursing note reviewed. Constitutional:       General: She is not in acute distress. Appearance: Normal appearance. She is not ill-appearing. HENT:      Head: Normocephalic and atraumatic. Mouth/Throat:      Mouth: Mucous membranes are moist.   Eyes:      Extraocular Movements: Extraocular movements intact. Conjunctiva/sclera: Conjunctivae normal.      Pupils: Pupils are equal, round, and reactive to light. Neck:      Vascular: No carotid bruit. Cardiovascular:      Rate and Rhythm: Normal rate and regular rhythm. Heart sounds: Normal heart sounds. No murmur heard. Pulmonary:      Effort: Pulmonary effort is normal. No respiratory distress. Breath sounds: Normal breath sounds. No wheezing, rhonchi or rales. Abdominal:      General: Bowel sounds are normal. There is no distension. Palpations: Abdomen is soft. Tenderness: There is no abdominal tenderness.    Musculoskeletal:         General: Tenderness present. No swelling. Cervical back: Neck supple. Right lower leg: No edema. Left lower leg: No edema. Comments: LS Spine w/o gross deformities, increase temp, erythema, swelling, or lesions. Tenderness midline L3-S1. ROM wnl. Spasms none. LE strength 5/5 with tone/ROM WNL. DTR 2/4. No gait abnormalities(toe/heel walking). Neurological:      General: No focal deficit present. Mental Status: She is alert and oriented to person, place, and time. Mental status is at baseline. Psychiatric:         Mood and Affect: Mood normal.         Behavior: Behavior normal.         Thought Content:  Thought content normal.         Judgment: Judgment normal.

## 2023-08-10 LAB — RHEUMATOID FACT SER QL LA: NEGATIVE

## 2023-08-11 LAB
EST. AVERAGE GLUCOSE BLD GHB EST-MCNC: 120 MG/DL
HBA1C MFR BLD: 5.8 %

## 2023-08-15 ENCOUNTER — EVALUATION (OUTPATIENT)
Dept: PHYSICAL THERAPY | Facility: CLINIC | Age: 83
End: 2023-08-15
Payer: MEDICARE

## 2023-08-15 DIAGNOSIS — R20.2 PARESTHESIA OF BOTH LOWER EXTREMITIES: ICD-10-CM

## 2023-08-15 DIAGNOSIS — R26.9 GAIT ABNORMALITY: Primary | ICD-10-CM

## 2023-08-15 DIAGNOSIS — M79.10 MYALGIA: ICD-10-CM

## 2023-08-15 DIAGNOSIS — M54.42 CHRONIC MIDLINE LOW BACK PAIN WITH BILATERAL SCIATICA: ICD-10-CM

## 2023-08-15 DIAGNOSIS — M54.41 CHRONIC MIDLINE LOW BACK PAIN WITH BILATERAL SCIATICA: ICD-10-CM

## 2023-08-15 DIAGNOSIS — G89.29 CHRONIC MIDLINE LOW BACK PAIN WITH BILATERAL SCIATICA: ICD-10-CM

## 2023-08-15 PROCEDURE — 97112 NEUROMUSCULAR REEDUCATION: CPT | Performed by: PHYSICAL THERAPIST

## 2023-08-15 PROCEDURE — 97162 PT EVAL MOD COMPLEX 30 MIN: CPT | Performed by: PHYSICAL THERAPIST

## 2023-08-15 NOTE — PROGRESS NOTES
PT Evaluation     Today's date: 8/15/2023  Patient name: Noel Meehan  : 1940  MRN: [id number]  Referring provider: Yessy Stubbs DO  Dx:   Encounter Diagnosis     ICD-10-CM    1. Gait abnormality  R26.9       2. Myalgia  M79.10       3. Paresthesia of both lower extremities  R20.2       4. Chronic midline low back pain with bilateral sciatica  M54.41     M54.42     G89.29                      Assessment  Assessment details: The patient is an 83 y/o female who presents to PT with diagnosis of gait abnormality, myalgia, paresthesia of BLE and chronic midline LBP with bilateral sciatica. She has complaints of intermittent LB and leg pain along with intermittent tingling into B legs. Her BLE ROM is WFL at this time. She demonstrates deficits with decreased BLE strength, decreased L/S ROM and core strength, decreased flexibility, decreased postural awareness, slow jori with ambulation, decreased balance and proprioception and pain with completing her ADLs and tasks at home. She is I with all her ADLs and tasks at home though does have pain with doing activities, mostly with activities in which she has to bend forward, such as washing the dishes, folding laundry or making her bed. Her primary complaint is tingling in her legs when she stands or sits for a few minutes. Her 5x STS time is 22.51". Her Tinetti Balance Score is 15, Tinetti Gait Score is 11 for a total score of 26. Her DGI score is 19. She is at risk of falls though she denies any falls at home. Secondary to pain and above deficits she is limited with her overall mobility and function. The patient would benefit from continued PT to address deficits and improve function. Tx to include ROM, stretching, strengthening, modalities, HEP, pt education, postural ed, lifting/body mechanics, neuro re-ed, balance/proprioception Te, MT and equipment.       Impairments: abnormal or restricted ROM, activity intolerance, impaired balance, impaired physical strength, lacks appropriate home exercise program, pain with function and weight-bearing intolerance  Other impairment: decreased flexibility  Understanding of Dx/Px/POC: good   Prognosis: good    Goals  STGs:  1. Initiate and complete HEP with verbal cues. 2.  Improve BLE strength by 1/2 grade in 4 weeks. 3.  Decrease LBP to < 6/10 at worst in 4 weeks. LTGs:  1. Patient to be I with HEP in 12 weeks. 2.  Improve BLE strength to > 4 to 4+/5 t/o in 12 weeks to improve function. 3.  Improve 5x STS time to < 15" in 12 weeks. 4.  Improve DGI score to > 22 in 12 weeks to improve function and decrease fall risk. 5.  Ambulation is improved to maximal level of function in 12 weeks. 6.  Stair negotiation is improved to maximal level of function in 12 weeks. 7.  Recreational performance in related activities is improved to maximal level of function in 12 weeks. 8.  ADL performance is improved to maximal level of function in 12 weeks. 9.  Patient to report tingling in B legs decreased by > 75% in 12 weeks. 10.  Decrease LBP to < 3-4/10 in 12 weeks to help improve function. Plan  Plan details: Modalities and therapy interventions prn.     Patient would benefit from: skilled physical therapy  Planned modality interventions: cryotherapy and thermotherapy: hydrocollator packs  Planned therapy interventions: abdominal trunk stabilization, manual therapy, balance, balance/weight bearing training, neuromuscular re-education, body mechanics training, postural training, self care, patient education, strengthening, stretching, therapeutic exercise, therapeutic activities, transfer training, flexibility, gait training and home exercise program  Frequency: 2x week  Duration in weeks: 12  Plan of Care beginning date: 8/15/2023  Plan of Care expiration date: 11/7/2023  Treatment plan discussed with: patient        Subjective Evaluation    History of Present Illness  Mechanism of injury: The patient states that she sees her PCP every six months for a check up. She was to see the doctor last week. She notes that her back pain comes and goes. She will get intermittent pain into her B legs along with the back pain. She does report that she gets tingling in her B legs with sitting (R leg > L leg). Tingling will start a few minutes after sitting. She notes that she does also get the tingling with standing after a few minutes. She notes that she does get restless legs overnight and she will get up and walk around, at times with tingling when laying down. The patient states that at time she will get pain in her thighs with walking, needs to take shorter steps to help decrease the pain. She had one episode a few weeks ago when she stood up and couldn't walk without holding on for about 15 minutes. She reports that at times she feels like she "veers to the side" when walking. She notes that the doctor did blood work (she reports this was normal) and she also had an x-ray of her back (she did not get the results yet). She will be going back to see her PCP next month for her follow up appointment. Patient Goals  Patient goals for therapy: increased motion, improved balance, decreased pain and increased strength  Patient goal: "To be able to walk better without pain, to help decrease the tingling in my leg."    Pain  At best pain ratin  At worst pain ratin  Location: LBP, into B legs   Quality: radiating and dull ache  Relieving factors: medications (OTC medicine as needed )  Aggravating factors: standing, walking and sitting    Social Support  Stairs in house: yes   Lives in: multiple-level home  Lives with: spouse    Employment status: not working        Objective     Concurrent Complaints  Positive for disturbed sleep. Negative for bladder dysfunction, bowel dysfunction and saddle (S4) numbness    Static Posture     Head  Forward. Shoulders  Rounded.     Lumbar Spine   Decreased lordosis. Postural Observations  Seated posture: fair  Correction of posture: has no consistent effect        Palpation   Left   No palpable tenderness to the erector spinae, lumbar interspinals, lumbar paraspinals and quadratus lumborum. Right   No palpable tenderness to the erector spinae, lumbar interspinals, lumbar paraspinals and quadratus lumborum. Tenderness     Left Hip   No tenderness in the PSIS. Right Hip   No tenderness in the PSIS. Active Range of Motion     Lumbar   Flexion: Active lumbar flexion: 2" from floor.    Extension:  Restriction level: moderate  Left lateral flexion:  Restriction level: minimal  Right lateral flexion:  Restriction level: minimal  Left rotation:  Restriction level: minimal  Right rotation:  Restriction level: minimal  Left Hip   Flexion: 90 degrees   Abduction: 25 degrees     Right Hip   Flexion: 90 degrees   Abduction: 25 degrees   Left Knee   Flexion: WFL  Extension: WFL    Right Knee   Flexion: WFL  Extension: WFL    Additional Active Range of Motion Details  L SLR: 50  R SLR: 50    Strength/Myotome Testing     Left Hip   Planes of Motion   Flexion: 4  Abduction: 4-  Adduction: 4+    Right Hip   Planes of Motion   Flexion: 4  Abduction: 4-  Adduction: 4+    Left Knee   Flexion: 4+  Extension: 4+    Right Knee   Flexion: 4+  Extension: 4+    Ambulation   Weight-Bearing Status   Assistive device used: none    Ambulation: Level Surfaces   Ambulation without assistive device: independent    Ambulation: Stairs   Ascend stairs: independent  Pattern: reciprocal  Descend stairs: independent  Pattern: reciprocal  Neuro Exam:     Functional outcomes   5x sit to stand: 22.51" (seconds)                Precautions: Fall Risk, HTN, Arthritis        Manuals 8/15       BLE                                Neuro Re-Ed         JONATHAN Charles        SLS        Tandem Stance        Sidestepping        Tandem Amb        FT w/HT & HN        FA w/HT & HN        PPT        PPT w/SLR PPT w/march                Ther Ex        NuStep        HR/TR        SLR x 3        LAQ        Marches        Bridges        S/L Hip Abd        Clamshells        SBB        For Flexion stretch w/PBall        UBE Retro        Ther Activity        Stepups F/L        Stepdowns        STS                Gait Training                        Modalities        HP/CP prn

## 2023-08-17 ENCOUNTER — OFFICE VISIT (OUTPATIENT)
Dept: PHYSICAL THERAPY | Facility: CLINIC | Age: 83
End: 2023-08-17
Payer: MEDICARE

## 2023-08-17 DIAGNOSIS — M54.42 CHRONIC MIDLINE LOW BACK PAIN WITH BILATERAL SCIATICA: ICD-10-CM

## 2023-08-17 DIAGNOSIS — M54.41 CHRONIC MIDLINE LOW BACK PAIN WITH BILATERAL SCIATICA: ICD-10-CM

## 2023-08-17 DIAGNOSIS — R26.9 GAIT ABNORMALITY: Primary | ICD-10-CM

## 2023-08-17 DIAGNOSIS — G89.29 CHRONIC MIDLINE LOW BACK PAIN WITH BILATERAL SCIATICA: ICD-10-CM

## 2023-08-17 DIAGNOSIS — R20.2 PARESTHESIA OF BOTH LOWER EXTREMITIES: ICD-10-CM

## 2023-08-17 DIAGNOSIS — M79.10 MYALGIA: ICD-10-CM

## 2023-08-17 PROCEDURE — 97110 THERAPEUTIC EXERCISES: CPT

## 2023-08-17 PROCEDURE — 97140 MANUAL THERAPY 1/> REGIONS: CPT

## 2023-08-17 NOTE — PROGRESS NOTES
Daily Note     Today's date: 2023  Patient name: Samra Knowles  : 1940  MRN: [id number]  Referring provider: Feng Walker DO  Dx:   Encounter Diagnosis     ICD-10-CM    1. Gait abnormality  R26.9       2. Myalgia  M79.10       3. Paresthesia of both lower extremities  R20.2       4. Chronic midline low back pain with bilateral sciatica  M54.41     M54.42     G89.29           Start Time: 1030  Stop Time: 1105  Total time in clinic (min): 35 minutes    Subjective: Patient reports no new complaints since initial evaluation. She states that numbness/tingling in LEs is constant and she noted pain in anterior thigh and low back this morning. Objective: See treatment diary below      Assessment: Treatment session consisted of learning new stretches and exercises throughout program. Patient responded well to manual stretches and had good tolerance with exercises presented today and noted a reduction in symptoms at end of session. Plan: Continue progressing program as tolerated.         Precautions: Fall Risk, HTN, Arthritis        Manuals 8/15 8/17      BLE  MS                              Neuro Re-Ed         JONATHAN Charles        SLS        Tandem Stance        Sidestepping        Tandem Amb        FT w/HT & HN        FA w/HT & HN        PPT  5" x 10      PPT w/SLR  5" x 10      PPT w/march  X 10 ea              Ther Ex        NuStep  5'       HR/TR  x20      SLR x 3  X 10 ea      LAQ  5" x 10      Marches        Bridges  3" x 10      S/L Hip Abd        Clamshells        SBB        For Flexion stretch w/PBall  5" x 10      UBE Retro        Ther Activity        Stepups F/L        Stepdowns        STS                Gait Training                        Modalities        HP/CP prn

## 2023-08-22 ENCOUNTER — OFFICE VISIT (OUTPATIENT)
Dept: PHYSICAL THERAPY | Facility: CLINIC | Age: 83
End: 2023-08-22
Payer: MEDICARE

## 2023-08-22 DIAGNOSIS — M54.42 CHRONIC MIDLINE LOW BACK PAIN WITH BILATERAL SCIATICA: ICD-10-CM

## 2023-08-22 DIAGNOSIS — M79.10 MYALGIA: ICD-10-CM

## 2023-08-22 DIAGNOSIS — M54.41 CHRONIC MIDLINE LOW BACK PAIN WITH BILATERAL SCIATICA: ICD-10-CM

## 2023-08-22 DIAGNOSIS — G89.29 CHRONIC MIDLINE LOW BACK PAIN WITH BILATERAL SCIATICA: ICD-10-CM

## 2023-08-22 DIAGNOSIS — R26.9 GAIT ABNORMALITY: Primary | ICD-10-CM

## 2023-08-22 DIAGNOSIS — R20.2 PARESTHESIA OF BOTH LOWER EXTREMITIES: ICD-10-CM

## 2023-08-22 PROCEDURE — 97110 THERAPEUTIC EXERCISES: CPT | Performed by: PHYSICAL THERAPIST

## 2023-08-22 PROCEDURE — 97112 NEUROMUSCULAR REEDUCATION: CPT | Performed by: PHYSICAL THERAPIST

## 2023-08-22 NOTE — PROGRESS NOTES
Daily Note     Today's date: 2023  Patient name: Asya Andrea  : 1940  MRN: [id number]  Referring provider: Celeste Pritchett DO  Dx:   Encounter Diagnosis     ICD-10-CM    1. Gait abnormality  R26.9       2. Myalgia  M79.10       3. Paresthesia of both lower extremities  R20.2       4. Chronic midline low back pain with bilateral sciatica  M54.41     M54.42     G89.29           Start Time: 1300  Stop Time: 1345  Total time in clinic (min): 45 minutes    Subjective: Patient states no current pain today. Biggest complaint at this time is of bilateral R>L LE tingling. Objective: See treatment diary below  JENNIFER: decreased radicular symptoms  Static prone: resolved radicular symptoms    Max limitation in lumbar extension AROM    Assessment: Tolerated treatment well. Good response to extension bias exercises. Significant limitation in lumbar extension causing some local discomfort/ stiffness with prone laying, pillow was added helping with central symptoms. N+T resolved at the end of the session. Reviewed lumbar support in sitting with towel roll and added extension exercises only to home program. Patient would benefit from continued PT      Plan: Continue per plan of care. Progress treatment as tolerated.        Precautions: Fall Risk, HTN, Arthritis     [id number]       Manuals 8/15 8/17 8/22     BLE  MS                              Neuro Re-Ed         JONATHAN Charles        SLS        Tandem Stance   1x:30 bilat EO, EC     Sidestepping        Tandem Amb   // bar 4x     Backwards walking   // bar 4x     Walking HT & HN   // bars 4x ea     FA w/HT & HN        PPT  5" x 10      PPT w/SLR  5" x 10      PPT w/march  X 10 ea      Core brace    :05x10     hooklying knee fall out   10x bilat     Towel roll education   10 min     Ther Ex        NuStep  5'  5 min L1     HR/TR  x20 2x10 ea     SLR x 3  X 10 ea      LAQ  5" x 10 bilat 2x10     Marches        Bridges  3" x 10 :03x10     S/L Hip Abd        Clamshells SBB        For Flexion stretch w/PBall  5" x 10 DC     UBE Retro                Standing lumbar extension   10x     Prone lay   3 min with pillow under hips     Ther Activity        Stepups F/L        Stepdowns        STS                Gait Training                        Modalities        HP/CP prn

## 2023-08-24 ENCOUNTER — OFFICE VISIT (OUTPATIENT)
Dept: PHYSICAL THERAPY | Facility: CLINIC | Age: 83
End: 2023-08-24
Payer: MEDICARE

## 2023-08-24 DIAGNOSIS — R26.9 GAIT ABNORMALITY: Primary | ICD-10-CM

## 2023-08-24 DIAGNOSIS — M79.10 MYALGIA: ICD-10-CM

## 2023-08-24 DIAGNOSIS — G89.29 CHRONIC MIDLINE LOW BACK PAIN WITH BILATERAL SCIATICA: ICD-10-CM

## 2023-08-24 DIAGNOSIS — R20.2 PARESTHESIA OF BOTH LOWER EXTREMITIES: ICD-10-CM

## 2023-08-24 DIAGNOSIS — M54.41 CHRONIC MIDLINE LOW BACK PAIN WITH BILATERAL SCIATICA: ICD-10-CM

## 2023-08-24 DIAGNOSIS — M54.42 CHRONIC MIDLINE LOW BACK PAIN WITH BILATERAL SCIATICA: ICD-10-CM

## 2023-08-24 PROCEDURE — 97110 THERAPEUTIC EXERCISES: CPT

## 2023-08-24 PROCEDURE — 97112 NEUROMUSCULAR REEDUCATION: CPT

## 2023-08-24 NOTE — PROGRESS NOTES
Daily Note     Today's date: 2023  Patient name: Michele Earl  : 1940  MRN: [id number]  Referring provider: Monika Rodriguez DO  Dx:   Encounter Diagnosis     ICD-10-CM    1. Gait abnormality  R26.9       2. Myalgia  M79.10       3. Paresthesia of both lower extremities  R20.2       4. Chronic midline low back pain with bilateral sciatica  M54.41     M54.42     G89.29           Start Time: 1400  Stop Time: 1438  Total time in clinic (min): 38 minutes    Subjective: Patient reports she was hurting after last session until the next day but she got threw it. She states she continues to have tingling in both her legs R>L with everything she does. Objective: See treatment diary below  Bolster under knees    Max limitation in lumbar extension AROM    Assessment: Tolerated treatment well. Good response to extension bias exercises. Significant limitation in lumbar extension causing some local discomfort/ stiffness with prone laying. Decreased some reps and increased others today due to subjective pain comments. Patient would benefit from continued PT      Plan: Continue per plan of care. Progress treatment as tolerated.        Precautions: Fall Risk, HTN, Arthritis     [id number]       Manuals 8/15 8/17 8/22 8/24    BLE  MS                              Neuro Re-Ed         JONATHAN Charles        SLS        Tandem Stance   1x:30 bilat EO, EC 1x:1 min bilat EO, EC    Sidestepping    // bar 5x    Tandem Amb   // bar 4x // bar 5x    Backwards walking   // bar 4x // bar 5x    Walking HT & HN   // bars 4x ea     FA w/HT & HN        PPT  5" x 10      PPT w/SLR  5" x 10      PPT w/march  X 10 ea      Core brace    :05x10 :05x30    hooklying knee fall out   10x bilat 20x gifty    Towel roll education   10 min     Ther Ex        NuStep  5'  5 min L1     HR/TR  x20 2x10 ea 2x10 ea    SLR x 3  X 10 ea      LAQ  5" x 10 bilat 2x10 bilat 2x10    Marches        Bridges  3" x 10 :03x10 3x10    S/L Hip Abd        Clamshells SBB        For Flexion stretch w/PBall  5" x 10 DC     UBE Retro                Standing lumbar extension   10x 10x3 throughout session    Prone lay   3 min with pillow under hips 3 min with pillow under hips    Ther Activity        Stepups F/L        Stepdowns        STS                Gait Training                        Modalities        HP/CP prn

## 2023-08-29 ENCOUNTER — OFFICE VISIT (OUTPATIENT)
Dept: PHYSICAL THERAPY | Facility: CLINIC | Age: 83
End: 2023-08-29
Payer: MEDICARE

## 2023-08-29 DIAGNOSIS — M54.42 CHRONIC MIDLINE LOW BACK PAIN WITH BILATERAL SCIATICA: ICD-10-CM

## 2023-08-29 DIAGNOSIS — M54.41 CHRONIC MIDLINE LOW BACK PAIN WITH BILATERAL SCIATICA: ICD-10-CM

## 2023-08-29 DIAGNOSIS — M79.10 MYALGIA: ICD-10-CM

## 2023-08-29 DIAGNOSIS — R26.9 GAIT ABNORMALITY: Primary | ICD-10-CM

## 2023-08-29 DIAGNOSIS — R20.2 PARESTHESIA OF BOTH LOWER EXTREMITIES: ICD-10-CM

## 2023-08-29 DIAGNOSIS — G89.29 CHRONIC MIDLINE LOW BACK PAIN WITH BILATERAL SCIATICA: ICD-10-CM

## 2023-08-29 PROCEDURE — 97110 THERAPEUTIC EXERCISES: CPT

## 2023-08-29 PROCEDURE — 97112 NEUROMUSCULAR REEDUCATION: CPT

## 2023-08-29 NOTE — PROGRESS NOTES
Daily Note     Today's date: 2023  Patient name: Arvind Delcid  : 1940  MRN: [id number]  Referring provider: Ty Alberto DO  Dx:   Encounter Diagnosis     ICD-10-CM    1. Gait abnormality  R26.9       2. Myalgia  M79.10       3. Paresthesia of both lower extremities  R20.2       4. Chronic midline low back pain with bilateral sciatica  M54.41     M54.42     G89.29           Start Time: 1103          Subjective: Patient states she has no pain, she is just stiff today. She has been working on her back extensions and they have been helping. Objective: See treatment diary below      Assessment: Tolerated treatment well. Patient would benefit from continued PT for stretching and strengthening. Patient was able to add exercises to her program with little difficulty and stiffness. Patient seemed to understand all education given on posture and proper performance of exercises. She felt a little sore in the low back, but her tingling in the legs and feet have disappeared with standing extensions. She has anxiety that her balance may not improve even though she feels she is moving better. Patient was a little sore in low back by the end of the session. Plan: Continue per plan of care. Progress treatment as tolerated.        Precautions: Fall Risk, HTN, Arthritis     [id number]       Manuals 8/15 8/17 8/22 8/24 8/29   BLE  MS                              Neuro Re-Ed         JONATHAN Charles        SLS        Tandem Stance   1x:30 bilat EO, EC 1x:1 min bilat EO, EC EO/ 1H EC :30x1 ea   Sidestepping    // bar 5x // bar 10 ft x4   Tandem Amb   // bar 4x // bar 5x // bars 10ft x4   Backwards walking   // bar 4x // bar 5x // bars 10 ft x4   Walking HT & HN   // bars 4x ea  // bars 10 ft x4   FA w/HT & HN        PPT  5" x 10      PPT w/SLR  5" x 10      PPT w/march  X 10 ea   x10 w/ core  brace   Core brace    :05x10 :05x30 :05x10   hooklying knee fall out   10x bilat 20x gifty x10 B/L   Towel roll education 10 min     Ther Ex        NuStep s=6  5'  5 min L1  L1 x6min   HR/TR  x20 2x10 ea 2x10 ea x10   SLR x 3  X 10 ea   x10 ea   LAQ  5" x 10 bilat 2x10 bilat 2x10 x10 B/L    Marches     x10   Bridges  3" x 10 :03x10 3x10 3x10   S/L Hip Abd        Clamshells     SL x10 ea   SBB        For Flexion stretch w/PBall  5" x 10 DC     UBE Retro                Standing lumbar extension   10x 10x3 throughout session 3x10 thoughout   Prone lay   3 min with pillow under hips 3 min with pillow under hips 3 min with pillow under hips   Ther Activity        Stepups F/L        Stepdowns        STS                Gait Training                        Modalities        HP/CP prn

## 2023-08-31 ENCOUNTER — OFFICE VISIT (OUTPATIENT)
Dept: PHYSICAL THERAPY | Facility: CLINIC | Age: 83
End: 2023-08-31
Payer: MEDICARE

## 2023-08-31 DIAGNOSIS — R26.9 GAIT ABNORMALITY: Primary | ICD-10-CM

## 2023-08-31 DIAGNOSIS — M54.41 CHRONIC MIDLINE LOW BACK PAIN WITH BILATERAL SCIATICA: ICD-10-CM

## 2023-08-31 DIAGNOSIS — M54.42 CHRONIC MIDLINE LOW BACK PAIN WITH BILATERAL SCIATICA: ICD-10-CM

## 2023-08-31 DIAGNOSIS — R20.2 PARESTHESIA OF BOTH LOWER EXTREMITIES: ICD-10-CM

## 2023-08-31 DIAGNOSIS — G89.29 CHRONIC MIDLINE LOW BACK PAIN WITH BILATERAL SCIATICA: ICD-10-CM

## 2023-08-31 DIAGNOSIS — M79.10 MYALGIA: ICD-10-CM

## 2023-08-31 PROCEDURE — 97110 THERAPEUTIC EXERCISES: CPT

## 2023-08-31 PROCEDURE — 97112 NEUROMUSCULAR REEDUCATION: CPT

## 2023-08-31 NOTE — PROGRESS NOTES
Daily Note     Today's date: 2023  Patient name: Vamshi Johnson  : 1940  MRN: [id number]  Referring provider: Mercedes Swan DO  Dx:   Encounter Diagnosis     ICD-10-CM    1. Gait abnormality  R26.9       2. Myalgia  M79.10       3. Paresthesia of both lower extremities  R20.2       4. Chronic midline low back pain with bilateral sciatica  M54.41     M54.42     G89.29           Start Time: 1117  Stop Time: 1200  Total time in clinic (min): 43 minutes    Subjective: Patient reports minimal back pain upon arrival to PT, however it's usually the worst when she first wakes up. Patient also stated that LBP has almost dimished especially while she performs activities such as gardening. She notes that numbness/tingling in BLEs has been slightly improving with lumbar extension stretches. Objective: See treatment diary below      Assessment: Patient being seen today for core and BLE strengthening and balance activities . Progressed to increased wt/reps for exercises to challenge functional strength. She was able to tolerate tandem stance on foam without experiencing a LOB. Noted fatigue post session but is pleased with progressions made in PT each week. She had a very good performance and completed full program without increased LBP or discomfort. Patient would benefit from continued PT for strengthening and to improve radicular symptoms. Plan: Continue increasing reps/resistance as tolerated next session.       Precautions: Fall Risk, HTN, Arthritis     [id number]       Manuals 8/15 8/17 8/22 8/24 8/29 8/31   BLE  MS                                  Neuro Re-Ed          JONATHAN Charles         SLS         Tandem Stance   1x:30 bilat EO, EC 1x:1 min bilat EO, EC EO/ 1H EC :30x1 ea EO on foam no HH, 15" x 4   Sidestepping    // bar 5x // bar 10 ft x4 // bar 10 ft x4   Tandem Amb   // bar 4x // bar 5x // bars 10ft x4 // bar 10 ft x4   Backwards walking   // bar 4x // bar 5x // bars 10 ft x4 NT   Walking HT & HN   // bars 4x ea  // bars 10 ft x4 // bar 10 ft x4     FA w/HT & HN         PPT  5" x 10       PPT w/SLR  5" x 10       PPT w/march  X 10 ea   x10 w/ core  brace x15 w/ core brace   Core brace    :05x10 :05x30 :05x10 :05x10   hooklying knee fall out   10x bilat 20x gifty x10 B/L x15 B/L   Towel roll education   10 min      Ther Ex         NuStep s=6  5'  5 min L1  L1 x6min L1 x 8 min   HR/TR  x20 2x10 ea 2x10 ea x10 2x10   SLR x 3  X 10 ea   x10 ea 1# x 10 ea   LAQ  5" x 10 bilat 2x10 bilat 2x10 x10 B/L 1# 2x10    Marches     x10 x10   Bridges  3" x 10 :03x10 3x10 3x10 3x10   S/L Hip Abd         Clamshells     SL x10 ea 2x10 ea   SBB         For Flexion stretch w/PBall  5" x 10 DC      UBE Retro                  Standing lumbar extension   10x 10x3 throughout session 3x10 thoughout 2x10   Prone lay   3 min with pillow under hips 3 min with pillow under hips 3 min with pillow under hips 3 min with pillow under hips   Ther Activity         Stepups F/L         Stepdowns         STS                  Gait Training                           Modalities         HP/CP prn

## 2023-09-04 NOTE — PROGRESS NOTES
Daily Note     Today's date: 2023  Patient name: Asya Andrea  : 1940  MRN: [id number]  Referring provider: Celeste Pritchett DO  Dx:   Encounter Diagnosis     ICD-10-CM    1. Gait abnormality  R26.9       2. Myalgia  M79.10       3. Paresthesia of both lower extremities  R20.2       4. Chronic midline low back pain with bilateral sciatica  M54.41     M54.42     G89.29                      Subjective: The patient states that she still gets the numbness in her legs if she sits or stands for too long but feels it may be a little better since starting therapy. Reports that she does the standing back bends at home during the day. Objective: See treatment diary below. Assessment: Tolerated treatment well. She did report some lateral knee pain while on the NuStep, resolved after 2 minutes, no knee pain noted with other TE today. She is challenged with balance activities, more on foam vs firm surface. Decreased leg tingling noted at end of session. She remains limited with lumbar extension, though reports decreased discomfort with laying prone. Patient demonstrated fatigue post treatment and would benefit from continued PT. Plan: Continue per plan of care.       Precautions: Fall Risk, HTN, Arthritis     [id number]       Manuals    BLE  MS                                  Neuro Re-Ed          JONATHAN Charles         SLS         Tandem Stance EO on foam no HH, 15"x4  1x:30 bilat EO, EC 1x:1 min bilat EO, EC EO/ 1H EC :30x1 ea EO on foam no HH, 15" x 4   Sidestepping // bars   10 ft x 4   // bar 5x // bar 10 ft x4 // bar 10 ft x4   Tandem Amb // bars  10 ft x 4  // bar 4x // bar 5x // bars 10ft x4 // bar 10 ft x4   Backwards walking   // bar 4x // bar 5x // bars 10 ft x4 NT   Walking HT & HN // bars   10 ft x 4  // bars 4x ea  // bars 10 ft x4 // bar 10 ft x4     FA w/HT & HN         PPT  5" x 10       PPT w/SLR  5" x 10       PPT w/march 15x w/core brace X 10 ea x10 w/ core  brace x15 w/ core brace   Core brace  5"x10  :05x10 :05x30 :05x10 :05x10   hooklying knee fall out 15x Jase  10x bilat 20x jase x10 B/L x15 B/L   Towel roll education   10 min      Ther Ex         NuStep s=6 L1 10 mins 5'  5 min L1  L1 x6min L1 x 8 min   HR/TR 2x10 ea x20 2x10 ea 2x10 ea x10 2x10   SLR x 3 1# 10x ea X 10 ea   x10 ea 1# x 10 ea   LAQ 1# 2x10 5" x 10 bilat 2x10 bilat 2x10 x10 B/L 1# 2x10    Marches 10x Stand    x10 x10   Bridges 3x10 3" x 10 :03x10 3x10 3x10 3x10   S/L Hip Abd         Clamshells SL 2x10    SL x10 ea 2x10 ea   SBB         For Flexion stretch w/PBall  5" x 10 DC      UBE Retro                  Standing lumbar extension 2x10 t/o session    10x 10x3 throughout session 3x10 thoughout 2x10   Prone lay 3 min with pillow under hips  3 min with pillow under hips 3 min with pillow under hips 3 min with pillow under hips 3 min with pillow under hips   Ther Activity         Stepups F/L         Stepdowns         STS                  Gait Training                           Modalities         HP/CP prn

## 2023-09-05 ENCOUNTER — OFFICE VISIT (OUTPATIENT)
Dept: PHYSICAL THERAPY | Facility: CLINIC | Age: 83
End: 2023-09-05
Payer: MEDICARE

## 2023-09-05 DIAGNOSIS — R20.2 PARESTHESIA OF BOTH LOWER EXTREMITIES: ICD-10-CM

## 2023-09-05 DIAGNOSIS — M54.42 CHRONIC MIDLINE LOW BACK PAIN WITH BILATERAL SCIATICA: ICD-10-CM

## 2023-09-05 DIAGNOSIS — R26.9 GAIT ABNORMALITY: Primary | ICD-10-CM

## 2023-09-05 DIAGNOSIS — G89.29 CHRONIC MIDLINE LOW BACK PAIN WITH BILATERAL SCIATICA: ICD-10-CM

## 2023-09-05 DIAGNOSIS — M79.10 MYALGIA: ICD-10-CM

## 2023-09-05 DIAGNOSIS — M54.41 CHRONIC MIDLINE LOW BACK PAIN WITH BILATERAL SCIATICA: ICD-10-CM

## 2023-09-05 PROCEDURE — 97112 NEUROMUSCULAR REEDUCATION: CPT | Performed by: PHYSICAL THERAPIST

## 2023-09-05 PROCEDURE — 97110 THERAPEUTIC EXERCISES: CPT | Performed by: PHYSICAL THERAPIST

## 2023-09-07 ENCOUNTER — OFFICE VISIT (OUTPATIENT)
Dept: PHYSICAL THERAPY | Facility: CLINIC | Age: 83
End: 2023-09-07
Payer: MEDICARE

## 2023-09-07 DIAGNOSIS — M79.10 MYALGIA: ICD-10-CM

## 2023-09-07 DIAGNOSIS — M54.42 CHRONIC MIDLINE LOW BACK PAIN WITH BILATERAL SCIATICA: ICD-10-CM

## 2023-09-07 DIAGNOSIS — M54.41 CHRONIC MIDLINE LOW BACK PAIN WITH BILATERAL SCIATICA: ICD-10-CM

## 2023-09-07 DIAGNOSIS — G89.29 CHRONIC MIDLINE LOW BACK PAIN WITH BILATERAL SCIATICA: ICD-10-CM

## 2023-09-07 DIAGNOSIS — R20.2 PARESTHESIA OF BOTH LOWER EXTREMITIES: ICD-10-CM

## 2023-09-07 DIAGNOSIS — R26.9 GAIT ABNORMALITY: Primary | ICD-10-CM

## 2023-09-07 PROCEDURE — 97110 THERAPEUTIC EXERCISES: CPT

## 2023-09-07 PROCEDURE — 97112 NEUROMUSCULAR REEDUCATION: CPT

## 2023-09-07 NOTE — PROGRESS NOTES
Daily Note     Today's date: 2023  Patient name: Cristina Rivero  : 1940  MRN: [id number]  Referring provider: Joseluis Oconnor DO  Dx:   Encounter Diagnosis     ICD-10-CM    1. Gait abnormality  R26.9       2. Myalgia  M79.10       3. Paresthesia of both lower extremities  R20.2       4. Chronic midline low back pain with bilateral sciatica  M54.41     M54.42     G89.29           Start Time: 1114  Stop Time: 1200  Total time in clinic (min): 46 minutes    Subjective: Patient reports being very pleased with improvements made in therapy each week. She notes that numbness tingling in LEs has reduced since performing lumbar extension stretches. Objective: See treatment diary below    Issued patient updated HEP and RTB, exercises listed below. Assessment: Compa Zelaya responded well to treatment session and exhibited good technique with Chepe presented today. Patient appears to be progressing well each week and demonstrates improvements in strength, endurance, and mobility. Progressed to increased reps and added RTB to make certain exercises more challenging. Issued patient an updated HEP and red theraband to use and she demonstrated understanding of exercises given. She continues to be responding well to lumbar extension exercises as it helps reduce numbness/tingling in LEs. Patient would benefit form continued therapy to improve LE strength, pain and overall function. Plan: Continue progressing POC as tolerated.       Precautions: Fall Risk, HTN, Arthritis     [id number]       Manuals    BLE   MS                                     Neuro Re-Ed           JONATHAN Charles          SLS          Tandem Stance EO on foam no HH, 15"x4 EO on foam no HH, 30"x2   1x:30 bilat EO, EC 1x:1 min bilat EO, EC EO/ 1H EC :30x1 ea EO on foam no HH, 15" x 4   Sidestepping // bars   10 ft x 4 RTB 10 ft x 4    // bar 5x // bar 10 ft x4 // bar 10 ft x4   Tandem Amb // bars  10 ft x 4 // bars  10 ft x 4  // bar 4x // bar 5x // bars 10ft x4 // bar 10 ft x4   Backwards walking    // bar 4x // bar 5x // bars 10 ft x4 NT   Walking HT & HN // bars   10 ft x 4 // bars   10 ft x 4  // bars 4x ea  // bars 10 ft x4 // bar 10 ft x4     FA w/HT & HN          PPT   5" x 10       PPT w/SLR   5" x 10       PPT w/march 15x w/core brace 15x w/core brace X 10 ea   x10 w/ core  brace x15 w/ core brace   Core brace  5"x10 5" x 10  :05x10 :05x30 :05x10 :05x10   hooklying knee fall out 15x Gifty 15x gifty   10x bilat 20x gifty x10 B/L x15 B/L   Towel roll education    10 min      Ther Ex          NuStep s=6 L1 10 mins L2 8 mins 5'  5 min L1  L1 x6min L1 x 8 min   HR/TR 2x10 ea 20x  x20 2x10 ea 2x10 ea x10 2x10   SLR x 3 1# 10x ea 1# x 15 ea X 10 ea   x10 ea 1# x 10 ea   LAQ 1# 2x10 1# 2x10 5" x 10 bilat 2x10 bilat 2x10 x10 B/L 1# 2x10    Marches 10x Stand 15x     x10 x10   Bridges 3x10 3x10 3" x 10 :03x10 3x10 3x10 3x10   S/L Hip Abd          Clamshells SL 2x10 RTB x 10    SL x10 ea 2x10 ea   SBB          For Flexion stretch w/PBall   5" x 10 DC      UBE Retro                    Standing lumbar extension 2x10 t/o session   2x10 t/o session    10x 10x3 throughout session 3x10 thoughout 2x10   Prone lay 3 min with pillow under hips 3 min with pillow under hips  3 min with pillow under hips 3 min with pillow under hips 3 min with pillow under hips 3 min with pillow under hips   Ther Activity          Stepups F/L          Stepdowns          STS                    Gait Training                              Modalities          HP/CP prn                        Access Code: [email or web address]  URL: [email or web address]  Date: 09/07/2023  Prepared by: 201 Stephanie Ricky with Resistance  - 1 x daily - 7 x weekly - 2 sets - 10 reps  - Supine Bridge  - 1 x daily - 7 x weekly - 2 sets - 10 reps  - Supine Active Straight Leg Raise  - 1 x daily - 7 x weekly - 2 sets - 10 reps  - Seated Long Arc Quad  - 1 x daily - 7 x weekly - 2 sets - 10 reps  - Supine March  - 1 x daily - 7 x weekly - 2 sets - 10 reps

## 2023-09-12 ENCOUNTER — OFFICE VISIT (OUTPATIENT)
Dept: PHYSICAL THERAPY | Facility: CLINIC | Age: 83
End: 2023-09-12
Payer: MEDICARE

## 2023-09-12 DIAGNOSIS — M79.10 MYALGIA: ICD-10-CM

## 2023-09-12 DIAGNOSIS — R20.2 PARESTHESIA OF BOTH LOWER EXTREMITIES: ICD-10-CM

## 2023-09-12 DIAGNOSIS — M54.41 CHRONIC MIDLINE LOW BACK PAIN WITH BILATERAL SCIATICA: ICD-10-CM

## 2023-09-12 DIAGNOSIS — R26.9 GAIT ABNORMALITY: Primary | ICD-10-CM

## 2023-09-12 DIAGNOSIS — G89.29 CHRONIC MIDLINE LOW BACK PAIN WITH BILATERAL SCIATICA: ICD-10-CM

## 2023-09-12 DIAGNOSIS — M54.42 CHRONIC MIDLINE LOW BACK PAIN WITH BILATERAL SCIATICA: ICD-10-CM

## 2023-09-12 PROCEDURE — 97112 NEUROMUSCULAR REEDUCATION: CPT | Performed by: PHYSICAL THERAPIST

## 2023-09-12 PROCEDURE — 97110 THERAPEUTIC EXERCISES: CPT | Performed by: PHYSICAL THERAPIST

## 2023-09-12 NOTE — PROGRESS NOTES
Daily Note     Today's date: 2023  Patient name: Baudilio Williamson  : 1940  MRN: [id number]  Referring provider: Linda Cuba DO  Dx: No diagnosis found. Subjective: States knees are bothering her more than back at this point. Has had improvement in low back symptoms since the start of PT. Objective: See treatment diary below      Assessment: Tolerated treatment well. Able to progress prone lay to QUINTON without complaint of low back pain. Discussed attempting laying on stomach at night if knees are bothering her to see if that position helps. Noted left hip adduction during standing march activity indicating lateral/posterior hip weakness, included standing hip ext diagonals to help improve. Patient would benefit from continued PT      Plan: Continue per plan of care. Progress treatment as tolerated.        Precautions: Fall Risk, HTN, Arthritis     [id number]       Manuals    BLE                                    Neuro Re-Ed          JONATHAN Charles         SLS         Tandem Stance EO on foam no HH, 15"x4 EO on foam no HH, 30"x2  EO on foam no HH, 30"x2  1x:1 min bilat EO, EC EO/ 1H EC :30x1 ea EO on foam no HH, 15" x 4   Sidestepping // bars   10 ft x 4 RTB 10 ft x 4  RTB 10 ft x 4  // bar 5x // bar 10 ft x4 // bar 10 ft x4   Tandem Amb // bars  10 ft x 4 // bars  10 ft x 4 // bars  10 ft x 4 // bar 5x // bars 10ft x4 // bar 10 ft x4   Backwards walking    // bar 5x // bars 10 ft x4 NT   Walking HT & HN // bars   10 ft x 4 // bars   10 ft x 4 25ftx2 ea  // bars 10 ft x4 // bar 10 ft x4     FA w/HT & HN         PPT         PPT w/SLR         PPT w/march 15x w/core brace 15x w/core brace 15x w/core brace  x10 w/ core  brace x15 w/ core brace   Core brace  5"x10 5" x 10 5" x 10 :05x30 :05x10 :05x10   hooklying knee fall out 15x Gifty 15x gifty  15x gifty  20x gifty x10 B/L x15 B/L   Towel roll education         Ther Ex         NuStep s=6 L1 10 mins L2 8 mins L2 8 mins L1 x6min L1 x 8 min   HR/TR 2x10 ea 20x  30X EA 2x10 ea x10 2x10   SLR x 3 1# 10x ea 1# x 15 ea 1# 15x flex bilat  x10 ea 1# x 10 ea   LAQ 1# 2x10 1# 2x10 1# 2x10 bilat bilat 2x10 x10 B/L 1# 2x10    Marches 10x Stand 15x  15x with mirror  x10 x10   Bridges 3x10 3x10 3x10 3x10 3x10 3x10   S/L Hip Abd   Standing hip abd/ext diagonal 10x bilat      Clamshells SL 2x10 RTB x 10 10x red  SL x10 ea 2x10 ea   SBB         For Flexion stretch w/PBall         UBE Retro                  Standing lumbar extension 2x10 t/o session   2x10 t/o session   10x // bars 10x3 throughout session 3x10 thoughout 2x10   Prone lay 3 min with pillow under hips 3 min with pillow under hips 3 min QUINTON, no pillow 3 min with pillow under hips 3 min with pillow under hips 3 min with pillow under hips   Ther Activity         Stepups F/L         Stepdowns         STS                  Gait Training                           Modalities         HP/CP prn                      Access Code: [email or web address]  URL: [email or web address]  Date: 09/07/2023  Prepared by: Lm García with Resistance  - 1 x daily - 7 x weekly - 2 sets - 10 reps  - Supine Bridge  - 1 x daily - 7 x weekly - 2 sets - 10 reps  - Supine Active Straight Leg Raise  - 1 x daily - 7 x weekly - 2 sets - 10 reps  - Seated Long Arc Quad  - 1 x daily - 7 x weekly - 2 sets - 10 reps  - Supine March  - 1 x daily - 7 x weekly - 2 sets - 10 reps

## 2023-09-13 ENCOUNTER — OFFICE VISIT (OUTPATIENT)
Dept: INTERNAL MEDICINE CLINIC | Facility: CLINIC | Age: 83
End: 2023-09-13
Payer: MEDICARE

## 2023-09-13 VITALS
BODY MASS INDEX: 22.13 KG/M2 | OXYGEN SATURATION: 98 % | HEART RATE: 60 BPM | DIASTOLIC BLOOD PRESSURE: 72 MMHG | HEIGHT: 62 IN | TEMPERATURE: 99 F | SYSTOLIC BLOOD PRESSURE: 136 MMHG | WEIGHT: 120.25 LBS

## 2023-09-13 DIAGNOSIS — M54.41 CHRONIC MIDLINE LOW BACK PAIN WITH BILATERAL SCIATICA: Primary | ICD-10-CM

## 2023-09-13 DIAGNOSIS — G89.29 CHRONIC MIDLINE LOW BACK PAIN WITH BILATERAL SCIATICA: Primary | ICD-10-CM

## 2023-09-13 DIAGNOSIS — M51.37 DDD (DEGENERATIVE DISC DISEASE), LUMBOSACRAL: ICD-10-CM

## 2023-09-13 DIAGNOSIS — R26.9 GAIT ABNORMALITY: ICD-10-CM

## 2023-09-13 DIAGNOSIS — R73.03 PREDIABETES: ICD-10-CM

## 2023-09-13 DIAGNOSIS — M79.10 MYALGIA: ICD-10-CM

## 2023-09-13 DIAGNOSIS — Z23 ENCOUNTER FOR VACCINATION: ICD-10-CM

## 2023-09-13 DIAGNOSIS — M54.42 CHRONIC MIDLINE LOW BACK PAIN WITH BILATERAL SCIATICA: Primary | ICD-10-CM

## 2023-09-13 DIAGNOSIS — R20.2 PARESTHESIA OF BOTH LOWER EXTREMITIES: ICD-10-CM

## 2023-09-13 PROCEDURE — 99213 OFFICE O/P EST LOW 20 MIN: CPT | Performed by: INTERNAL MEDICINE

## 2023-09-13 NOTE — PATIENT INSTRUCTIONS
Prediabetes   AMBULATORY CARE:   Prediabetes  is a blood glucose (sugar) level that is higher than normal. It is not high enough to be considered diabetes. Prediabetes increases your risk for type 2 diabetes and heart disease. The risk is highest if you have high blood pressure or high cholesterol. The following increase your risk for prediabetes:   Being overweight or obese, with a body mass index (BMI) of 25 or higher (23 or higher if you are  American)    Lack of physical activity    Older age    Family history of diabetes (parent or sibling)    A history of heart disease, gestational diabetes, or polycystic ovary syndrome (PCOS)    High blood pressure or cholesterol levels    Being [place] American, , , [place] American, or     In children, having a mother with diabetes or gestational diabetes mellitus (GDM) during the pregnancy    Signs and symptoms:  Prediabetes may not cause any symptoms. Call your doctor if:   You have more hunger or thirst than usual.    You are urinating more often than usual.    You are always exhausted. You have blurred vision. You have questions or concerns about your condition or care. Prevent or delay type 2 diabetes:  Healthy choices work best to delay or prevent type 2 diabetes. You may be given the following guidelines from your healthcare provider:  Get regular physical activity. Adults should get at least 150 minutes (2.5 hours) of moderate physical activity every week. Spread the amount of activity over at least 3 days a week. Do not skip more than 2 days in a row. Children should get at least 60 minutes of moderate physical activity on most days of the week. Examples of moderate physical activity include brisk walking, running, and swimming. Do not sit for longer than 30 minutes at a time. Work with your healthcare provider to create a plan for physical activity. Lose weight if you are overweight.   A weight loss of 7% of your body weight can help. Your healthcare provider can tell you what weight is healthy for you. He or she can help you create a weight loss plan. Eat healthy foods. Eat a variety of fruits and vegetables. Eat whole-grain foods more often. Choose dairy foods, meat, and other protein foods that are low in fat. Eat fewer sweets, such as candy, cookies, regular soda, and sweetened drinks. You can also decrease calories by eating smaller portion sizes. Work with your healthcare provider or dietitian to develop a meal plan that is right for you. Take medicine as directed. Your healthcare provider may give diabetes medicine if you are at high risk for diabetes. You may also need medicines for high blood pressure and high cholesterol. Follow up with your healthcare provider as directed. You will need to return every year to get tested for diabetes. Do not smoke. Do not use e-cigarettes or smokeless tobacco in place of cigarettes or to help you quit. They still contain nicotine. Ask your healthcare provider for information if you currently smoke and need help quitting. Start with small goals and work your way up. You may need to start with 3 days of physical activity. You can add a day after 3 weeks or so of activity. Make a goal of losing 5 pounds at first. Talk with healthcare providers about making a plan that is right for you. Follow up with your doctor as directed: If you do have prediabetes, you will need to return every year to get tested for diabetes. Write down your questions so you remember to ask them during your visits. © Copyright [facility] 2022 Information is for End User's use only and may not be sold, redistributed or otherwise used for commercial purposes. The above information is an  only. It is not intended as medical advice for individual conditions or treatments.  Talk to your doctor, nurse or pharmacist before following any medical regimen to see if it is safe and effective for you.

## 2023-09-13 NOTE — PROGRESS NOTES
Assessment/Plan:  Problem List Items Addressed This Visit        Other    Prediabetes   Other Visit Diagnoses     Chronic midline low back pain with bilateral sciatica    -  Primary    Paresthesia of both lower extremities        Myalgia        Gait abnormality        DDD (degenerative disc disease), lumbosacral        Encounter for vaccination               Diagnoses and all orders for this visit:    Chronic midline low back pain with bilateral sciatica    Paresthesia of both lower extremities    Myalgia    Gait abnormality    Prediabetes    DDD (degenerative disc disease), lumbosacral    Encounter for vaccination        No problem-specific Assessment & Plan notes found for this encounter. A/P: Improving with paresthesia/sciatica not completely gone. Travis Ramirez Appreciate PT input. Discussed labs and imaging. Appears her s/s are all related to MS/back. Will finish PT and continue with PRN tylenol for now. Discussed pain management, EMG, cymbalta or elyse and defers. Continue current treatment and RTC three months. Defers flu vaccines. Subjective:      Patient ID: Asya Andrea is a 82 y.o. female. WF RTC for f/u worsening LBP with sciatic and difficulty walking. ?paresthesia due to back or to neuropathy/CTD. Labs were good except for prediabetic range HgA1c and xray showed moderate DDD. Sent to PT and daily tylenol. Attending PT and reports slow, but steady overall improvement. No new issues. The following portions of the patient's history were reviewed and updated as appropriate:   She has a past medical history of Arthritis, Generalized anxiety disorder, Hypertension, and Vaginal prolapse.,  does not have any pertinent problems on file. ,   has a past surgical history that includes Colonoscopy; Hysterectomy; Bilateral oophorectomy; Bony pelvis surgery; Incontinence surgery; Bladder exstrophy closure; Eye surgery; and Cataract extraction. ,  family history includes Arthritis in her mother; Depression in her mother; Diabetes in her family and father; Heart disease in her brother and mother; No Known Problems in her daughter, daughter, maternal aunt, maternal grandmother, paternal aunt, paternal aunt, and paternal grandmother; Prostate cancer in her father; Stroke in her mother; Substance Abuse in her mother. ,   reports that she has never smoked. She has never been exposed to tobacco smoke. She has never used smokeless tobacco. She reports that she does not drink alcohol and does not use drugs. ,  is allergic to aspirin and procaine. .  Current Outpatient Medications   Medication Sig Dispense Refill   • Artificial Tear Ointment (DRY EYES OP) Administer 1 application to both eyes 2 (two) times a day     • Bioflavonoid Products (C COMPLEX PO) Take 1 tablet by mouth daily     • Calcium Carbonate-Vitamin D 600-125 MG-UNIT TABS Take 1 tablet by mouth 2 (two) times a day     • Coenzyme Q10 (CO Q-10) 200 MG CAPS Take by mouth     • Cranberry 425 MG CAPS Take by mouth     • docusate sodium (COLACE) 100 mg capsule Take 1 capsule by mouth     • Evening Primrose Oil 1000 MG CAPS Take by mouth     • Garlic 2278 MG CAPS Take by mouth     • Lutein 40 MG CAPS Take by mouth     • metoprolol succinate (TOPROL-XL) 50 mg 24 hr tablet TAKE 1 TABLET DAILY 90 tablet 3   • Multiple Vitamin (MULTI-VITAMIN DAILY PO) Take by mouth daily     • Omega-3 1000 MG CAPS Take 2 capsules by mouth 2 (two) times a day     • Turmeric 500 MG CAPS Take 1 capsule by mouth daily       No current facility-administered medications for this visit. Review of Systems   Constitutional: Positive for activity change. Negative for chills, diaphoresis, fatigue and fever. Respiratory: Negative for cough, chest tightness, shortness of breath and wheezing. Cardiovascular: Negative for chest pain, palpitations and leg swelling. Gastrointestinal: Negative for abdominal pain, constipation, diarrhea, nausea and vomiting.    Genitourinary: Negative for difficulty urinating, dysuria and frequency. Musculoskeletal: Positive for back pain and gait problem. Negative for arthralgias and myalgias. Neurological: Negative for weakness, light-headedness, numbness and headaches. Psychiatric/Behavioral: Negative for confusion. The patient is not nervous/anxious. PHQ-2/9 Depression Screening          Objective:  Vitals:    09/13/23 0913   BP: 136/72   Pulse: 60   Temp: 99 °F (37.2 °C)   SpO2: 98%   Weight: 54.5 kg (120 lb 4 oz)   Height: 5' 2" (1.575 m)     Body mass index is 21.99 kg/m². Physical Exam  Vitals and nursing note reviewed. Constitutional:       General: She is not in acute distress. Appearance: Normal appearance. She is not ill-appearing. HENT:      Head: Normocephalic and atraumatic. Mouth/Throat:      Mouth: Mucous membranes are moist.   Eyes:      Extraocular Movements: Extraocular movements intact. Conjunctiva/sclera: Conjunctivae normal.      Pupils: Pupils are equal, round, and reactive to light. Musculoskeletal:         General: Tenderness present. Right lower leg: No edema. Left lower leg: No edema. Comments: LS Spine w/o gross deformities, increase temp, erythema, swelling, or lesions. Tenderness none. ROM wnl. Spasms none. LE strength 5/5 with tone/ROM WNL. DTR 2/4. No gait abnormalities(toe/heel walking). Neurological:      General: No focal deficit present. Mental Status: She is alert and oriented to person, place, and time. Mental status is at baseline. Psychiatric:         Mood and Affect: Mood normal.         Behavior: Behavior normal.         Thought Content:  Thought content normal.         Judgment: Judgment normal.

## 2023-09-14 ENCOUNTER — EVALUATION (OUTPATIENT)
Dept: PHYSICAL THERAPY | Facility: CLINIC | Age: 83
End: 2023-09-14
Payer: MEDICARE

## 2023-09-14 DIAGNOSIS — G89.29 CHRONIC MIDLINE LOW BACK PAIN WITH BILATERAL SCIATICA: ICD-10-CM

## 2023-09-14 DIAGNOSIS — R20.2 PARESTHESIA OF BOTH LOWER EXTREMITIES: ICD-10-CM

## 2023-09-14 DIAGNOSIS — M79.10 MYALGIA: ICD-10-CM

## 2023-09-14 DIAGNOSIS — M54.42 CHRONIC MIDLINE LOW BACK PAIN WITH BILATERAL SCIATICA: ICD-10-CM

## 2023-09-14 DIAGNOSIS — M54.41 CHRONIC MIDLINE LOW BACK PAIN WITH BILATERAL SCIATICA: ICD-10-CM

## 2023-09-14 DIAGNOSIS — R26.9 GAIT ABNORMALITY: Primary | ICD-10-CM

## 2023-09-14 PROCEDURE — 97110 THERAPEUTIC EXERCISES: CPT | Performed by: PHYSICAL THERAPIST

## 2023-09-14 PROCEDURE — 97112 NEUROMUSCULAR REEDUCATION: CPT | Performed by: PHYSICAL THERAPIST

## 2023-09-14 NOTE — PROGRESS NOTES
PT Re-Evaluation  and PT Discharge    Today's date: 2023  Patient name: Luis Manuel Burton  : 1940  MRN: [id number]  Referring provider: Yoselin Sauceda DO  Dx:   Encounter Diagnosis     ICD-10-CM    1. Gait abnormality  R26.9       2. Myalgia  M79.10       3. Paresthesia of both lower extremities  R20.2       4. Chronic midline low back pain with bilateral sciatica  M54.41     M54.42     G89.29           Start Time: 1115  Stop Time: 1200  Total time in clinic (min): 45 minutes    Assessment  Assessment details: The patient is an 83 y/o female who presents to PT with diagnosis of gait abnormality, myalgia, paresthesia of BLE and chronic midline LBP with bilateral sciatica. She has been seen in outpatient PT for 10 sessions with initial evaluation performed 8/15/23. Re-evaluation today shows significant improvement towards goals with decreased pain intensity and occurrence as well as increased strength, ROM, and functional mobility. Malorie Lo is independent with HEP at this time and has good understanding of deficits and what to focus on for continued progression and maintenance of symptoms. Patient no longer requires skilled PT services and is to be discharged from care to home exercise program, which was reviewed today. Understanding of Dx/Px/POC: good   Prognosis: good    Goals  STGs:  1. Initiate and complete HEP with verbal cues. - MET   2. Improve BLE strength by 1/2 grade in 4 weeks. MET   3. Decrease LBP to < 6/10 at worst in 4 weeks. - MET   LTGs:  1. Patient to be I with HEP in 12 weeks. - MET   2. Improve BLE strength to > 4 to 4+/5 t/o in 12 weeks to improve function. - MET   3. Improve 5x STS time to < 15" in 12 weeks. - MET   4. Improve DGI score to > 22 in 12 weeks to improve function and decrease fall risk. 5.  Ambulation is improved to maximal level of function in 12 weeks. - PROGRESSING  6.   Stair negotiation is improved to maximal level of function in 12 weeks. - MET 9/14  7. Recreational performance in related activities is improved to maximal level of function in 12 weeks. - MET 9/14  8. ADL performance is improved to maximal level of function in 12 weeks. - MET 9/14  9. Patient to report tingling in B legs decreased by > 75% in 12 weeks. - PROGRESSING  10. Decrease LBP to < 3-4/10 in 12 weeks to help improve function.    - PROGRESSING        Plan  Plan details: DC to [facility]  Plan of Care beginning date: 8/15/2023  Treatment plan discussed with: patient        Subjective Evaluation    History of Present Illness  Subjective 9/14: States overall she has been feeling better. Still has occasional bilateral LE numbness into feet, overall decreased intensity and occurrence. Back pain will come and go, but is able to improve with home exercise program. N+T increased with prolonged sitting, will improve with walking. Mechanism of injury: The patient states that she sees her PCP every six months for a check up. She was to see the doctor last week. She notes that her back pain comes and goes. She will get intermittent pain into her B legs along with the back pain. She does report that she gets tingling in her B legs with sitting (R leg > L leg). Tingling will start a few minutes after sitting. She notes that she does also get the tingling with standing after a few minutes. She notes that she does get restless legs overnight and she will get up and walk around, at times with tingling when laying down. The patient states that at time she will get pain in her thighs with walking, needs to take shorter steps to help decrease the pain. She had one episode a few weeks ago when she stood up and couldn't walk without holding on for about 15 minutes. She reports that at times she feels like she "veers to the side" when walking.          She notes that the doctor did blood work (she reports this was normal) and she also had an x-ray of her back (she did not get the results yet). She will be going back to see her PCP next month for her follow up appointment. Patient Goals  Patient goals for therapy: increased motion, improved balance, decreased pain and increased strength  Patient goal: "To be able to walk better without pain, to help decrease the tingling in my leg."    Pain    9/14  At best pain ratin  0  At worst pain ratin  Back: 4 LE:5  Location: LBP, into B legs   Quality: radiating and dull ache  Relieving factors: medications (OTC medicine as needed )  Aggravating factors: standing, walking and sitting    Social Support  Stairs in house: yes   Lives in: multiple-level home  Lives with: spouse    Employment status: not working        Objective     Concurrent Complaints  Positive for disturbed sleep. Negative for bladder dysfunction, bowel dysfunction and saddle (S4) numbness  : (-) disturbed sleep    Static Posture     Head  Forward. Shoulders  Rounded. Lumbar Spine   Decreased lordosis. Postural Observations  Seated posture: fair  Correction of posture: has no consistent effect        Palpation   Left   No palpable tenderness to the erector spinae, lumbar interspinals, lumbar paraspinals and quadratus lumborum. Right   No palpable tenderness to the erector spinae, lumbar interspinals, lumbar paraspinals and quadratus lumborum. Tenderness     Left Hip   No tenderness in the PSIS. Right Hip   No tenderness in the PSIS. Active Range of Motion     Lumbar         Flexion: Active lumbar flexion: 2" from floor.    WNL  Extension:  Restriction level: moderate  moderate  Left lateral flexion:  Restriction level: minimal  Minimal   Right lateral flexion:  Restriction level: minimal minimal  Left rotation:  Restriction level: minimal  minimal  Right rotation:  Restriction level: minimal  minimal  Left Hip   Flexion: 90 degrees   Abduction: 25 degrees     Right Hip   Flexion: 90 degrees   Abduction: 25 degrees   Left Knee Flexion: WFL  Extension: WFL    Right Knee   Flexion: WFL  Extension: [facility]    Additional Active Range of Motion Details  L SLR: 50  R SLR: 50  9/14: 80 deg bilateral    Strength/Myotome Testing  9/14    Left Hip   Planes of Motion   Flexion: 4    4+  Abduction: 4-    4  Adduction: 4+    5    Right Hip   Planes of Motion   Flexion: 4    4  Abduction: 4-    4  Adduction: 4+    5    Left Knee   Flexion: 4+    5  Extension: 4+    5    Right Knee   Flexion: 4+    5  Extension: 4+    5    Ambulation   Weight-Bearing Status   Assistive device used: none    Ambulation: Level Surfaces   Ambulation without assistive device: independent    Ambulation: Stairs   Ascend stairs: independent  Pattern: reciprocal  Descend stairs: independent  Pattern: reciprocal  Neuro Exam:     Functional outcomes   5x sit to stand: 22.51" (seconds)  9/14: 14 sec no UE assist               Precautions: Fall Risk, HTN, Arthritis     [id number]       Manuals 9/5 9/7 9/12 9/14 8/29 8/31   BLE                                    Neuro Re-Ed     HEP review     JONATHAN Charles         SLS         Tandem Stance EO on foam no HH, 15"x4 EO on foam no HH, 30"x2  EO on foam no HH, 30"x2   EO/ 1H EC :30x1 ea EO on foam no HH, 15" x 4   Sidestepping // bars   10 ft x 4 RTB 10 ft x 4  RTB 10 ft x 4   // bar 10 ft x4 // bar 10 ft x4   Tandem Amb // bars  10 ft x 4 // bars  10 ft x 4 // bars  10 ft x 4  // bars 10ft x4 // bar 10 ft x4   Backwards walking     // bars 10 ft x4 NT   Walking HT & HN // bars   10 ft x 4 // bars   10 ft x 4 25ftx2 ea  // bars 10 ft x4 // bar 10 ft x4     FA w/HT & HN         PPT         PPT w/SLR         PPT w/march 15x w/core brace 15x w/core brace 15x w/core brace  x10 w/ core  brace x15 w/ core brace   Core brace  5"x10 5" x 10 5" x 10  :05x10 :05x10   hooklying knee fall out 15x Jase 15x jase  15x jase   x10 B/L x15 B/L   Towel roll education         Ther Ex    HEP review     NuStep s=6 L1 10 mins L2 8 mins L2 8 mins L2 8 mins L1 x6min L1 x 8 min   HR/TR 2x10 ea 20x  30X EA  x10 2x10   SLR x 3 1# 10x ea 1# x 15 ea 1# 15x flex bilat  x10 ea 1# x 10 ea   LAQ 1# 2x10 1# 2x10 1# 2x10 bilat  x10 B/L 1# 2x10    Marches 10x Stand 15x  15x with mirror  x10 x10   Bridges 3x10 3x10 3x10  3x10 3x10   S/L Hip Abd   Standing hip abd/ext diagonal 10x bilat      Clamshells SL 2x10 RTB x 10 10x red  SL x10 ea 2x10 ea   SBB         For Flexion stretch w/PBall         UBE Retro         Hip flexor stretch    At stairs :15x4     Prone knee flexion    10x     Standing lumbar extension 2x10 t/o session   2x10 t/o session   10x // bars 10x 3x10 thoughout 2x10   Prone lay 3 min with pillow under hips 3 min with pillow under hips 3 min QUINTON, no pillow QUINTON 3 min 3 min with pillow under hips 3 min with pillow under hips   Ther Activity         Stepups F/L         Stepdowns         STS                  Gait Training                           Modalities         HP/CP prn                      Access Code: [email or web address]  URL: [email or web address]  Date: 09/07/2023  Prepared by: Lm García with Resistance  - 1 x daily - 7 x weekly - 2 sets - 10 reps  - Supine Bridge  - 1 x daily - 7 x weekly - 2 sets - 10 reps  - Supine Active Straight Leg Raise  - 1 x daily - 7 x weekly - 2 sets - 10 reps  - Seated Long Arc Quad  - 1 x daily - 7 x weekly - 2 sets - 10 reps  - Supine March  - 1 x daily - 7 x weekly - 2 sets - 10 reps

## 2023-11-21 DIAGNOSIS — I10 BENIGN ESSENTIAL HYPERTENSION: ICD-10-CM

## 2023-11-21 DIAGNOSIS — R00.2 PALPITATIONS: ICD-10-CM

## 2023-11-21 DIAGNOSIS — F41.1 GENERALIZED ANXIETY DISORDER: ICD-10-CM

## 2023-11-21 RX ORDER — METOPROLOL SUCCINATE 50 MG/1
TABLET, EXTENDED RELEASE ORAL
Qty: 90 TABLET | Refills: 3 | Status: SHIPPED | OUTPATIENT
Start: 2023-11-21

## 2023-12-13 ENCOUNTER — OFFICE VISIT (OUTPATIENT)
Dept: INTERNAL MEDICINE CLINIC | Facility: CLINIC | Age: 83
End: 2023-12-13
Payer: MEDICARE

## 2023-12-13 ENCOUNTER — APPOINTMENT (OUTPATIENT)
Dept: LAB | Facility: CLINIC | Age: 83
End: 2023-12-13
Payer: MEDICARE

## 2023-12-13 VITALS
SYSTOLIC BLOOD PRESSURE: 138 MMHG | BODY MASS INDEX: 22.34 KG/M2 | OXYGEN SATURATION: 98 % | WEIGHT: 121.38 LBS | TEMPERATURE: 96.5 F | DIASTOLIC BLOOD PRESSURE: 82 MMHG | HEIGHT: 62 IN | HEART RATE: 72 BPM

## 2023-12-13 DIAGNOSIS — Z23 ENCOUNTER FOR IMMUNIZATION: Primary | ICD-10-CM

## 2023-12-13 DIAGNOSIS — M85.80 OSTEOPENIA, UNSPECIFIED LOCATION: ICD-10-CM

## 2023-12-13 DIAGNOSIS — M15.9 GENERALIZED OSTEOARTHRITIS: ICD-10-CM

## 2023-12-13 DIAGNOSIS — L30.9 DERMATITIS: ICD-10-CM
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
## 2023-12-13 DIAGNOSIS — R73.03 PREDIABETES: ICD-10-CM

## 2023-12-13 DIAGNOSIS — S30.860A INSECT BITE OF LOWER BACK, INITIAL ENCOUNTER: ICD-10-CM

## 2023-12-13 DIAGNOSIS — F32.0 MILD MAJOR DEPRESSION, SINGLE EPISODE (HCC): ICD-10-CM

## 2023-12-13 DIAGNOSIS — E78.2 MIXED HYPERLIPIDEMIA: ICD-10-CM

## 2023-12-13 DIAGNOSIS — F41.1 GENERALIZED ANXIETY DISORDER: ICD-10-CM

## 2023-12-13 DIAGNOSIS — I10 BENIGN ESSENTIAL HYPERTENSION: ICD-10-CM

## 2023-12-13 DIAGNOSIS — Z23 ENCOUNTER FOR VACCINATION: ICD-10-CM

## 2023-12-13 LAB
B BURGDOR IGG+IGM SER QL IA: NEGATIVE
CREAT UR-MCNC: 60.8 MG/DL
MICROALBUMIN UR-MCNC: <7 MG/L
MICROALBUMIN/CREAT 24H UR: <12 MG/G CREATININE (ref 0–30)

## 2023-12-13 PROCEDURE — 99214 OFFICE O/P EST MOD 30 MIN: CPT | Performed by: INTERNAL MEDICINE

## 2023-12-13 PROCEDURE — 82043 UR ALBUMIN QUANTITATIVE: CPT | Performed by: INTERNAL MEDICINE

## 2023-12-13 PROCEDURE — 82570 ASSAY OF URINE CREATININE: CPT | Performed by: INTERNAL MEDICINE

## 2023-12-13 PROCEDURE — G0008 ADMIN INFLUENZA VIRUS VAC: HCPCS | Performed by: INTERNAL MEDICINE

## 2023-12-13 PROCEDURE — 90662 IIV NO PRSV INCREASED AG IM: CPT | Performed by: INTERNAL MEDICINE

## 2023-12-13 PROCEDURE — 36415 COLL VENOUS BLD VENIPUNCTURE: CPT

## 2023-12-13 PROCEDURE — 86618 LYME DISEASE ANTIBODY: CPT

## 2023-12-13 RX ORDER — CLOTRIMAZOLE AND BETAMETHASONE DIPROPIONATE 10; .64 MG/G; MG/G
CREAM TOPICAL 2 TIMES DAILY
Qty: 45 G | Refills: 0 | Status: SHIPPED | OUTPATIENT
Start: 2023-12-13

## 2023-12-13 NOTE — PATIENT INSTRUCTIONS
Chronic Hypertension   AMBULATORY CARE:   Hypertension is considered chronic  when it continues for 3 months or longer. Hypertension that continues causes your heart to work much harder than normal, which may lead to heart damage. Even if you have hypertension for years, lifestyle changes, medicines, or both may help lower your blood pressure. Call your local emergency number [phone] in the [address]) or have someone call if:   You have chest pain. You have any of the following signs of a heart attack:      Squeezing, pressure, or pain in your chest    You may  also have any of the following:     Discomfort or pain in your back, neck, jaw, stomach, or arm    Shortness of breath    Nausea or vomiting    Lightheadedness or a sudden cold sweat    You become confused or have difficulty speaking. You suddenly feel lightheaded or have trouble breathing. Seek care immediately if:   You have a severe headache or vision loss. You have weakness in an arm or leg. Call your doctor or cardiologist if:   You feel faint, dizzy, confused, or drowsy. You have been taking your blood pressure medicine but your pressure is higher than your provider says it should be. You have questions or concerns about your condition or care. Treatment for chronic hypertension  may include medicine to lower your blood pressure and cholesterol levels. A low cholesterol level helps prevent heart disease and makes it easier to control your blood pressure. Heart disease can make your blood pressure harder to control. You may also need to make lifestyle changes. What you need to know about the stages of hypertension:  Your healthcare provider will give you a blood pressure goal based on your age, health, and risk for cardiovascular disease. The following are general guidelines on the stages of hypertension:  Normal blood pressure is 119/79 or lower .  Your provider may only check your blood pressure each year if it stays at a normal level.    Elevated blood pressure is 120/79 to 129/79 . This is sometimes called prehypertension. Your provider may suggest lifestyle changes to help lower your blood pressure to a normal level. He or she may then check it again in 3 to 6 months. Stage 1 hypertension is 130/80  to 139/89 . Your provider may recommend lifestyle changes, medication, and checks every 3 to 6 months until your blood pressure is controlled. Stage 2 hypertension is 140/90 or higher . Your provider will recommend lifestyle changes and have you take 2 kinds of hypertension medicines. You will also need to have your blood pressure checked monthly until it is controlled. Manage chronic hypertension:   Check your blood pressure at home. Do not smoke, have caffeine, or exercise for at least 30 minutes before you check your blood pressure. Sit and rest for 5 minutes before you check your blood pressure. Extend your arm and support it on a flat surface. Your arm should be at the same level as your heart. Follow the directions that came with your blood pressure monitor. Check your blood pressure 2 times, 1 minute apart, before you take your medicine in the morning. Also check your blood pressure before your evening meal. Keep a record of your readings and bring it to your follow-up visits. Your healthcare provider may use the readings to make changes to your treatment plan. Manage any other health conditions you have. Health conditions such as diabetes can increase your risk for hypertension. Follow your provider's instructions and take all your medicines as directed. Talk to your provider about any new health conditions you have recently developed. Ask about all medicines. Certain medicines can increase your blood pressure. Examples include oral birth control pills, decongestants, herbal supplements, and NSAIDs, such as ibuprofen. Your provider can tell you which medicines are safe for you to take.  This includes prescription and over-the-counter medicines. Lifestyle changes you can make to lower your blood pressure: Your provider may want you to make more lifestyle changes if you are having trouble controlling your blood pressure. This may feel difficult over time, especially if you think you are making good changes but your pressure is still high. It might help to focus on one new change at a time. For example, try to add 1 more day of exercise, or exercise for an extra 10 minutes on 2 days. Small changes can make a big difference. Your healthcare provider can also refer you to specialists such as a dietitian who can help you make small changes. Your family members may be included in helping you learn to create lifestyle changes, such as the following:     Limit sodium (salt) as directed. Too much sodium can affect your fluid balance. Check labels to find low-sodium or no-salt-added foods. Some low-sodium foods use potassium salts for flavor. Too much potassium can also cause health problems. Your provider will tell you how much sodium and potassium are safe for you to have in a day. He or she may recommend that you limit sodium to 2,300 mg a day. Follow the meal plan recommended by your provider. A dietitian or your provider can give you more information on low-sodium plans or the DASH (Dietary Approaches to Stop Hypertension) eating plan. The DASH plan is low in sodium, processed sugar, unhealthy fats, and total fat. It is high in potassium, calcium, and fiber. These can be found in vegetables, fruit, and whole-grain foods. Be physically active throughout the day. Physical activity, such as exercise, can help control your blood pressure and your weight. Be physically active for at least 30 minutes per day, on most days of the week. Include aerobic activity, such as walking or riding a bicycle. Also include strength training at least 2 times each week.  Your provider can help you create a physical activity plan. Decrease stress. This may help lower your blood pressure. Learn ways to relax, such as deep breathing or listening to music. Limit alcohol as directed. Alcohol can increase your blood pressure. A drink of alcohol is 12 ounces of beer, 5 ounces of wine, or 1½ ounces of liquor. Your provider can help you set daily and weekly drink limits. He or she may recommend no alcohol if your blood pressure stays higher than goal even with medicine or other measures. Ask your provider for information if you need help to quit. Do not smoke. Nicotine and other chemicals in cigarettes and cigars can increase your blood pressure and also cause lung damage. Ask your provider for information if you currently smoke and need help to quit. E-cigarettes or smokeless tobacco still contain nicotine. Talk to your provider before you use these products. Follow up with your doctor or cardiologist as directed: You will need to return to have your blood pressure checked and to have other lab tests done. Write down your questions so you remember to ask them during your visits. © Copyright [facility] 2023 Information is for End User's use only and may not be sold, redistributed or otherwise used for commercial purposes. The above information is an  only. It is not intended as medical advice for individual conditions or treatments. Talk to your doctor, nurse or pharmacist before following any medical regimen to see if it is safe and effective for you.

## 2023-12-13 NOTE — PROGRESS NOTES
Assessment/Plan:  Problem List Items Addressed This Visit        Cardiovascular and Mediastinum    Benign essential hypertension    Relevant Orders    Albumin / creatinine urine ratio       Musculoskeletal and Integument    Osteopenia    Generalized osteoarthritis       Other    Prediabetes    Mild major depression, single episode (HCC)    Hyperlipidemia    Generalized anxiety disorder   Other Visit Diagnoses     Encounter for immunization    -  Primary    Relevant Orders    influenza vaccine, high-dose, PF 0.7 mL (FLUZONE HIGH-DOSE)    Encounter for vaccination        Insect bite of lower back, initial encounter        Relevant Medications    clotrimazole-betamethasone (LOTRISONE) 1-0.05 % cream    Other Relevant Orders    Lyme Total AB W Reflex to IGM/IGG    Dermatitis        Relevant Medications    clotrimazole-betamethasone (LOTRISONE) 1-0.05 % cream           Diagnoses and all orders for this visit:    Encounter for immunization  -     influenza vaccine, high-dose, PF 0.7 mL (FLUZONE HIGH-DOSE)    Benign essential hypertension  -     Albumin / creatinine urine ratio    Generalized osteoarthritis    Osteopenia, unspecified location    Generalized anxiety disorder    Mixed hyperlipidemia    Mild major depression, single episode ([address])    Prediabetes    Encounter for vaccination    Insect bite of lower back, initial encounter  -     Lyme Total AB W Reflex to IGM/IGG; Future    Dermatitis  -     clotrimazole-betamethasone (LOTRISONE) 1-0.05 % cream; Apply topically 2 (two) times a day        No problem-specific Assessment & Plan notes found for this encounter. A/P: Doing ok and will check labs. BMI is normal and will continue current program. Discussed vaccines will up date her flu vaccine. ?dermatitis vs tick bite. Will check labs and trial of 50/50 cream. If lab positive start treatment. . Continue current treatment and RTC four months for routine. Subjective:      Patient ID: Ludy Chen is a 82 y.o. female.  RTC for f/u HTN, HLD, etc. Doing ok and no new issues, except for a right lower back rash. Itchy. There for two months. ?insect bite prior to occurrence. Remains active w/o difficulty and no falls. Chronic pain is manageable. MDD/FARZAD are controlled. Due labs and vaccines. The following portions of the patient's history were reviewed and updated as appropriate:   She has a past medical history of Arthritis, Generalized anxiety disorder, Hypertension, and Vaginal prolapse.,  does not have any pertinent problems on file. ,   has a past surgical history that includes Colonoscopy; Hysterectomy; Bilateral oophorectomy; Bony pelvis surgery; Incontinence surgery; Bladder exstrophy closure; Eye surgery; and Cataract extraction. ,  family history includes Arthritis in her mother; Depression in her mother; Diabetes in her family and father; Heart disease in her brother and mother; No Known Problems in her daughter, daughter, maternal aunt, maternal grandmother, paternal aunt, paternal aunt, and paternal grandmother; Prostate cancer in her father; Stroke in her mother; Substance Abuse in her mother. ,   reports that she has never smoked. She has never been exposed to tobacco smoke. She has never used smokeless tobacco. She reports that she does not drink alcohol and does not use drugs. ,  is allergic to aspirin and procaine. .  Current Outpatient Medications   Medication Sig Dispense Refill   • Artificial Tear Ointment (DRY EYES OP) Administer 1 application to both eyes 2 (two) times a day     • Bioflavonoid Products (C COMPLEX PO) Take 1 tablet by mouth daily     • Calcium Carbonate-Vitamin D 600-125 MG-UNIT TABS Take 1 tablet by mouth 2 (two) times a day     • clotrimazole-betamethasone (LOTRISONE) 1-0.05 % cream Apply topically 2 (two) times a day 45 g 0   • Coenzyme Q10 (CO Q-10) 200 MG CAPS Take by mouth     • Cranberry 425 MG CAPS Take by mouth     • docusate sodium (COLACE) 100 mg capsule Take 1 capsule by mouth     • Evening Primrose Oil 1000 MG CAPS Take by mouth     • Garlic 3163 MG CAPS Take by mouth     • Lutein 40 MG CAPS Take by mouth     • metoprolol succinate (TOPROL-XL) 50 mg 24 hr tablet TAKE 1 TABLET DAILY 90 tablet 3   • Multiple Vitamin (MULTI-VITAMIN DAILY PO) Take by mouth daily     • Omega-3 1000 MG CAPS Take 2 capsules by mouth 2 (two) times a day     • Turmeric 500 MG CAPS Take 1 capsule by mouth daily       No current facility-administered medications for this visit. Review of Systems   Constitutional:  Negative for activity change, chills, diaphoresis, fatigue and fever. HENT: Negative. Eyes:  Negative for visual disturbance. Respiratory:  Negative for cough, chest tightness, shortness of breath and wheezing. Cardiovascular:  Negative for chest pain, palpitations and leg swelling. Gastrointestinal:  Negative for abdominal pain, constipation, diarrhea, nausea and vomiting. Endocrine: Negative for cold intolerance and heat intolerance. Genitourinary:  Negative for difficulty urinating, dysuria and frequency. Musculoskeletal:  Negative for arthralgias, gait problem and myalgias. Skin:  Positive for rash. Neurological:  Negative for dizziness, seizures, syncope, weakness, light-headedness and headaches. Psychiatric/Behavioral:  Negative for confusion, dysphoric mood and sleep disturbance. The patient is not nervous/anxious. PHQ-2/9 Depression Screening          Objective:  Vitals:    12/13/23 0919   BP: 138/82   Pulse: 72   Temp: (!) 96.5 °F (35.8 °C)   SpO2: 98%   Weight: 55.1 kg (121 lb 6 oz)   Height: 5' 2" (1.575 m)     Body mass index is 22.2 kg/m². Physical Exam  Vitals and nursing note reviewed. Constitutional:       General: She is not in acute distress. Appearance: Normal appearance. She is not ill-appearing. HENT:      Head: Normocephalic and atraumatic.       Mouth/Throat:      Mouth: Mucous membranes are moist.   Eyes:      Extraocular Movements: Extraocular movements intact. Conjunctiva/sclera: Conjunctivae normal.      Pupils: Pupils are equal, round, and reactive to light. Neck:      Vascular: No carotid bruit. Cardiovascular:      Rate and Rhythm: Normal rate and regular rhythm. Heart sounds: Normal heart sounds. No murmur heard. Pulmonary:      Effort: Pulmonary effort is normal. No respiratory distress. Breath sounds: Normal breath sounds. No wheezing, rhonchi or rales. Abdominal:      General: Bowel sounds are normal. There is no distension. Palpations: Abdomen is soft. Tenderness: There is no abdominal tenderness. Musculoskeletal:      Cervical back: Neck supple. Right lower leg: No edema. Left lower leg: No edema. Skin:     Findings: Lesion (large erthymatous area right lower back with central clearing.) present. Neurological:      General: No focal deficit present. Mental Status: She is alert and oriented to person, place, and time. Mental status is at baseline. Psychiatric:         Mood and Affect: Mood normal.         Behavior: Behavior normal.         Thought Content:  Thought content normal.         Judgment: Judgment normal.

## 2024-02-05 ENCOUNTER — OFFICE VISIT (OUTPATIENT)
Dept: URGENT CARE | Facility: CLINIC | Age: 84
End: 2024-02-05
Payer: MEDICARE

## 2024-02-05 VITALS
HEIGHT: 62 IN | HEART RATE: 71 BPM | WEIGHT: 121 LBS | TEMPERATURE: 99.9 F | BODY MASS INDEX: 22.26 KG/M2 | RESPIRATION RATE: 18 BRPM | OXYGEN SATURATION: 96 % | DIASTOLIC BLOOD PRESSURE: 84 MMHG | SYSTOLIC BLOOD PRESSURE: 140 MMHG

## 2024-02-05 DIAGNOSIS — U07.1 COVID-19: Primary | ICD-10-CM

## 2024-02-05 PROCEDURE — 99213 OFFICE O/P EST LOW 20 MIN: CPT | Performed by: PHYSICIAN ASSISTANT

## 2024-02-05 PROCEDURE — G0463 HOSPITAL OUTPT CLINIC VISIT: HCPCS | Performed by: PHYSICIAN ASSISTANT

## 2024-02-05 NOTE — PROGRESS NOTES
[facility]      NAME: Blanca Shane is a 83 y.o. female  : 1940    MRN: [id number]  DATE: 2024  TIME: 3:13 PM    Assessment and Plan   COVID-19 [U07.1]  1. COVID-19            Patient Instructions    Discussed risk vs benefit of paxlovid, patient will decline. To call PCP within 5 days from symptom onset if interested. Infection appears viral.  Recommend symptomatic treatment.  Can take ibuprofen or tylenol as needed for pain or fever.  Over the counter cough and cold medications to help with symptoms.  Use salt water gargles for sore throat and throat lozenges.  Cough drops as needed.  Wash hands frequently to prevent the spread of infection.  If not improving over the next 7-10 days, follow up with PCP.  Symptoms may persist for 10-14 days.  To present to the ER if symptoms worsen.  Chief Complaint     Chief Complaint   Patient presents with    COVID     Patient tested positive for covid yesterday. Symptoms started on Friday.  Patient c/o congestion, sore throat, fever and body aches.         History of Present Illness   Blanca Shane presents to the clinic with  c/o    URI   This is a new problem. Episode onset: . The problem has been gradually improving. Associated symptoms include congestion, rhinorrhea (2 weeks) and a sore throat. Pertinent negatives include no abdominal pain, chest pain, coughing, ear pain, headaches, rash or wheezing. She has tried decongestant for the symptoms. The treatment provided mild relief.       Review of Systems   Review of Systems   Constitutional:  Positive for chills, fatigue and fever. Negative for diaphoresis.   HENT:  Positive for congestion, rhinorrhea (2 weeks) and sore throat. Negative for ear discharge, ear pain and facial swelling.    Eyes:  Negative for photophobia, pain, discharge, redness, itching and visual disturbance.   Respiratory:  Negative for apnea, cough, chest tightness, shortness of breath and wheezing.     Cardiovascular:  Negative for chest pain and palpitations.   Gastrointestinal:  Negative for abdominal pain.   Musculoskeletal:  Positive for myalgias.   Skin:  Negative for color change, rash and wound.   Neurological:  Negative for dizziness and headaches.   Hematological:  Negative for adenopathy.         Current Medications     Long-Term Medications   Medication Sig Dispense Refill    Calcium Carbonate-Vitamin D 600-125 MG-UNIT TABS Take 1 tablet by mouth 2 (two) times a day      clotrimazole-betamethasone (LOTRISONE) 1-0.05 % cream Apply topically 2 (two) times a day 45 g 0    docusate sodium (COLACE) 100 mg capsule Take 1 capsule by mouth      Evening Primrose Oil 1000 MG CAPS Take by mouth      metoprolol succinate (TOPROL-XL) 50 mg 24 hr tablet TAKE 1 TABLET DAILY 90 tablet 3    Omega-3 1000 MG CAPS Take 2 capsules by mouth 2 (two) times a day         Current Allergies     Allergies as of 02/05/2024 - Reviewed 02/05/2024   Allergen Reaction Noted    Aspirin  11/13/2017    Procaine  11/13/2017            The following portions of the patient's history were reviewed and updated as appropriate: allergies, current medications, past family history, past medical history, past social history, past surgical history and problem list.  Past Medical History:   Diagnosis Date    Arthritis     Generalized anxiety disorder     Hypertension     Vaginal prolapse     last assessed: 11/13/2017     Past Surgical History:   Procedure Laterality Date    BILATERAL OOPHORECTOMY      last assessed: 11/13/2017    BLADDER EXSTROPHY CLOSURE      Repair of.     BONY PELVIS SURGERY      Posterior Colporrhaphy (for pelvic Relaxation) last assessed: 11/13/2017    CATARACT EXTRACTION      COLONOSCOPY      last assessed: 11/13/2017    EYE SURGERY      HYSTERECTOMY      INCONTINENCE SURGERY      Removal/Revision of sling for stress incontinence.      Social History     Socioeconomic History    Marital status: /Civil Union     Spouse  "name: Not on file    Number of children: 3    Years of education: Not on file    Highest education level: Not on file   Occupational History    Occupation: retired     Comment: retired   Tobacco Use    Smoking status: Never     Passive exposure: Never    Smokeless tobacco: Never   Vaping Use    Vaping status: Never Used   Substance and Sexual Activity    Alcohol use: No    Drug use: No    Sexual activity: Not Currently     Birth control/protection: Post-menopausal   Other Topics Concern    Not on file   Social History Narrative    Lives with      Social Determinants of Health     Financial Resource Strain: Low Risk  (2/6/2023)    Overall Financial Resource Strain (CARDIA)     Difficulty of Paying Living Expenses: Not hard at all   Food Insecurity: Not on file   Transportation Needs: No Transportation Needs (2/6/2023)    PRAPARE - Transportation     Lack of Transportation (Medical): No     Lack of Transportation (Non-Medical): No   Physical Activity: Not on file   Stress: Not on file   Social Connections: Not on file   Intimate Partner Violence: Not on file   Housing Stability: Not on file       Objective   /84   Pulse 71   Temp 99.9 °F (37.7 °C) (Temporal)   Resp 18   Ht 5' 2\" (1.575 m)   Wt 54.9 kg (121 lb)   SpO2 96%   BMI 22.13 kg/m²      Physical Exam     Physical Exam  Vitals and nursing note reviewed.   Constitutional:       General: She is not in acute distress.     Appearance: She is well-developed. She is not diaphoretic.   HENT:      Head: Normocephalic and atraumatic.      Right Ear: Tympanic membrane and external ear normal.      Left Ear: Tympanic membrane and external ear normal.      Nose: Nose normal.      Mouth/Throat:      Mouth: Mucous membranes are moist.      Pharynx: No oropharyngeal exudate or posterior oropharyngeal erythema.   Eyes:      General: No scleral icterus.        Right eye: No discharge.         Left eye: No discharge.      Conjunctiva/sclera: Conjunctivae " normal.   Cardiovascular:      Rate and Rhythm: Normal rate and regular rhythm.      Heart sounds: Normal heart sounds. No murmur heard.     No friction rub. No gallop.   Pulmonary:      Effort: Pulmonary effort is normal. No respiratory distress.      Breath sounds: Normal breath sounds. No decreased breath sounds, wheezing, rhonchi or rales.   Skin:     General: Skin is warm and dry.      Coloration: Skin is not pale.      Findings: No erythema or rash.   Neurological:      Mental Status: She is alert and oriented to person, place, and time.   Psychiatric:         Behavior: Behavior normal.         Thought Content: Thought content normal.         Judgment: Judgment normal.         Earlene Chavez PA-C

## 2024-02-07 ENCOUNTER — TELEPHONE (OUTPATIENT)
Dept: INTERNAL MEDICINE CLINIC | Facility: CLINIC | Age: 84
End: 2024-02-07

## 2024-02-07 NOTE — TELEPHONE ENCOUNTER
Was seen at urgent care 2/5/24 didn't test,  at home Positive Covid on 2/4/24 and 2/7/24.   Chills, fever,cough, runny nose, sore throat,body aches. Would like to know if she needs to be prescribed something.    positive 2/7/24 same symptoms

## 2024-02-08 ENCOUNTER — OFFICE VISIT (OUTPATIENT)
Dept: INTERNAL MEDICINE CLINIC | Facility: CLINIC | Age: 84
End: 2024-02-08
Payer: MEDICARE

## 2024-02-08 VITALS
WEIGHT: 121.6 LBS | SYSTOLIC BLOOD PRESSURE: 172 MMHG | DIASTOLIC BLOOD PRESSURE: 78 MMHG | OXYGEN SATURATION: 98 % | TEMPERATURE: 96.1 F | HEIGHT: 62 IN | HEART RATE: 72 BPM | BODY MASS INDEX: 22.38 KG/M2

## 2024-02-08 DIAGNOSIS — U07.1 LAB TEST POSITIVE FOR DETECTION OF COVID-19 VIRUS: Primary | ICD-10-CM

## 2024-02-08 PROCEDURE — 99213 OFFICE O/P EST LOW 20 MIN: CPT | Performed by: INTERNAL MEDICINE

## 2024-02-08 NOTE — PROGRESS NOTES
COVID-19 Outpatient Progress Note    Assessment/Plan:    Problem List Items Addressed This Visit    None  Visit Diagnoses     Lab test positive for detection of COVID-19 virus    -  Primary         Disposition:     Patient with asymptomatic/mild COVID-19: They were recommended to isolate for at least 5 days (day 0 is the day symptoms appeared or the date the specimen was collected for the positive test for people who are asymptomatic). If they are asymptomatic or symptoms are improving with no fevers in the past 24 hours, isolation may be ended followed by 5 days of wearing a high quality mask when around others to minimize risk of infecting others. They should wear a mask through day 10 and a test-based strategy may be used to remove a mask sooner.      Discussed symptom directed medication options with patient. Discussed vitamin D, vitamin C, and/or zinc supplementation with patient.     A/P: Day # 6 since onset of COVID s/s. Covid test positive at home yesterday. Doing ok and getting better. Some fatigue and chills. Some nasal congestion and PND with cough, but no SOB. Continue isolation/quarantine, increase fluids, PRN motrin/tylenol, and breathing exercises. RTC as scheduled for f/u. Can lift isolation after  is better.        I have spent a total time of 25 minutes on the day of the encounter for this patient including discussing diagnostic results, discussing prognosis, risks and benefits of treatment options, instructions for management, patient and family education, importance of treatment compliance, risk factor reductions, impressions, counseling/coordination of care, documenting in the medical record, reviewing/ordering tests, medicine, procedures and obtaining or reviewing history.       Encounter provider: Mau Mcneil DO     Provider located at: [address] [address]     Recent Visits  Date Type Provider Dept   02/07/24  "Telephone Ann Meredithalex  [facility]   Showing recent visits within past 7 days and meeting all other requirements  Today's Visits  Date Type Provider Dept   02/08/24 Office Visit Mau Mcneil DO  [facility]   Showing today's visits and meeting all other requirements  Future Appointments  No visits were found meeting these conditions.  Showing future appointments within next 150 days and meeting all other requirements     Subjective:   Blanca Shane is a 83 y.o. female who has been screened for COVID-19. Symptom change since last report: improving. Patient's symptoms include chills, fatigue, nasal congestion, rhinorrhea, sore throat, cough and headache. Patient denies fever, anosmia, loss of taste, shortness of breath, chest tightness, abdominal pain, nausea, vomiting, diarrhea and myalgias.     - Date of symptom onset: 2/2/2024  - Date of positive COVID-19 test: 2/7/2024. Type of test: Home antigen.     COVID-19 vaccination status: Fully vaccinated with booster    Blanca has been staying home and has isolated themselves in her home. She is taking care to not share personal items and is cleaning all surfaces that are touched often, like counters, tabletops, and doorknobs using household cleaning sprays or wipes. She is wearing a mask when she leaves her room.     No results found for: \"SARSCOV2\", \"[id number]\", \"SARSCORONAVI\", \"CORONAVIRUSR\", \"SARSCOVAG\", \"SARSCOVAGH\"    Review of Systems   Constitutional:  Positive for activity change, chills and fatigue. Negative for diaphoresis and fever.   HENT:  Positive for congestion, postnasal drip, rhinorrhea, sinus pressure and sore throat. Negative for ear discharge, ear pain, facial swelling and sinus pain.         No loss of taste/smell.    Respiratory:  Positive for cough. Negative for chest tightness, shortness of breath and wheezing.    Cardiovascular:  Negative for chest pain, palpitations and leg swelling.   Gastrointestinal:  Negative for " "abdominal pain, constipation, diarrhea, nausea and vomiting.   Genitourinary:  Negative for difficulty urinating, dysuria and frequency.   Musculoskeletal:  Negative for arthralgias, gait problem and myalgias.   Neurological:  Positive for headaches. Negative for light-headedness.   Psychiatric/Behavioral:  Negative for confusion. The patient is not nervous/anxious.      Current Outpatient Medications on File Prior to Visit   Medication Sig   • Artificial Tear Ointment (DRY EYES OP) Administer 1 application to both eyes 2 (two) times a day   • Bioflavonoid Products (C COMPLEX PO) Take 1 tablet by mouth daily   • Calcium Carbonate-Vitamin D 600-125 MG-UNIT TABS Take 1 tablet by mouth 2 (two) times a day   • clotrimazole-betamethasone (LOTRISONE) 1-0.05 % cream Apply topically 2 (two) times a day   • Coenzyme Q10 (CO Q-10) 200 MG CAPS Take by mouth   • Cranberry 425 MG CAPS Take by mouth   • docusate sodium (COLACE) 100 mg capsule Take 1 capsule by mouth   • Evening Primrose Oil 1000 MG CAPS Take by mouth   • Garlic 1000 MG CAPS Take by mouth   • Lutein 40 MG CAPS Take by mouth   • metoprolol succinate (TOPROL-XL) 50 mg 24 hr tablet TAKE 1 TABLET DAILY   • Multiple Vitamin (MULTI-VITAMIN DAILY PO) Take by mouth daily   • Omega-3 1000 MG CAPS Take 2 capsules by mouth 2 (two) times a day   • Turmeric 500 MG CAPS Take 1 capsule by mouth daily       Objective:    BP (!) 172/78   Pulse 72   Temp (!) 96.1 °F (35.6 °C)   Ht 5' 2\" (1.575 m)   Wt 55.2 kg (121 lb 9.6 oz)   SpO2 98%   BMI 22.24 kg/m²        Physical Exam  Vitals and nursing note reviewed.   Constitutional:       General: She is not in acute distress.     Appearance: Normal appearance. She is not ill-appearing.   HENT:      Head: Normocephalic and atraumatic.      Right Ear: Tympanic membrane, ear canal and external ear normal. There is no impacted cerumen.      Left Ear: Tympanic membrane, ear canal and external ear normal. There is no impacted cerumen.    "   Nose: No congestion or rhinorrhea.      Mouth/Throat:      Mouth: Mucous membranes are moist.      Pharynx: Posterior oropharyngeal erythema present. No oropharyngeal exudate.   Eyes:      Conjunctiva/sclera: Conjunctivae normal.      Pupils: Pupils are equal, round, and reactive to light.   Cardiovascular:      Rate and Rhythm: Normal rate and regular rhythm.      Heart sounds: Normal heart sounds. No murmur heard.  Pulmonary:      Effort: Pulmonary effort is normal. No respiratory distress.      Breath sounds: Normal breath sounds. No wheezing, rhonchi or rales.   Musculoskeletal:      Cervical back: Neck supple. No tenderness.   Lymphadenopathy:      Cervical: No cervical adenopathy.   Neurological:      General: No focal deficit present.      Mental Status: She is alert and oriented to person, place, and time. Mental status is at baseline.   Psychiatric:         Mood and Affect: Mood normal.         Behavior: Behavior normal.         Thought Content: Thought content normal.         Judgment: Judgment normal.       Mau Mcneil DO

## 2024-02-08 NOTE — PATIENT INSTRUCTIONS
COVID-19 (Coronavirus Disease 2019)   WHAT YOU NEED TO KNOW:   COVID-19 is the disease caused by a coronavirus first discovered in December 2019. The virus can be spread starting 2 to 3 days before symptoms begin. The virus has changed into several new forms (called variants) since it was discovered. A variant may be more easily spread or cause more severe illness than the original form.  DISCHARGE INSTRUCTIONS:   Call your local emergency number (911 in the [place]) if:   You have trouble breathing or shortness of breath at rest.    You have chest pain or pressure that lasts longer than 5 minutes.    You become confused or hard to wake.    Return to the emergency department if:   The skin on your face, fingers, or toes look blue or darker than usual.      Call your doctor if:   You have a fever.    You have questions or concerns about your condition or care.    Medicines:  You may need any of the following:  Decongestants  help reduce nasal congestion and help you breathe more easily. If you take decongestant pills, they may make you feel restless or cause problems with your sleep. Do not use decongestant sprays for more than a few days.    Cough suppressants  help reduce coughing. Ask your healthcare provider which type of cough medicine is best for you.    Acetaminophen  decreases pain and fever. It is available without a doctor's order. Ask how much to take and how often to take it. Follow directions. Read the labels of all other medicines you are using to see if they also contain acetaminophen, or ask your doctor or pharmacist. Acetaminophen can cause liver damage if not taken correctly.    NSAIDs , such as ibuprofen, help decrease swelling, pain, and fever. This medicine is available with or without a doctor's order. NSAIDs can cause stomach bleeding or kidney problems in certain people. If you take blood thinner medicine, always ask your healthcare provider if NSAIDs are safe for you. Always read the medicine  label and follow directions.    Blood thinners  help prevent blood clots. Clots can cause strokes, heart attacks, and death. Many types of blood thinners are available. Your healthcare provider will give you specific instructions for the type you are given. The following are general safety guidelines to follow while you are taking a blood thinner:    Watch for bleeding and bruising. Watch for bleeding from your gums or nose. Watch for blood in your urine and bowel movements. Use a soft washcloth on your skin, and a soft toothbrush to brush your teeth. This can keep your skin and gums from bleeding. If you shave, use an electric shaver. Do not play contact sports.    Tell your dentist and other healthcare providers that you take a blood thinner. Wear a bracelet or necklace that says you take this medicine.    Do not start or stop any other medicines or supplements unless your healthcare provider tells you to. Many medicines and supplements cannot be used with blood thinners.    Take your blood thinner exactly as prescribed by your healthcare provider. Do not skip does or take less than prescribed. Tell your provider right away if you forget to take your blood thinner, or if you take too much.    Take your medicine as directed.  Contact your healthcare provider if you think your medicine is not helping or if you have side effects. Tell your provider if you are allergic to any medicine. Keep a list of the medicines, vitamins, and herbs you take. Include the amounts, and when and why you take them. Bring the list or the pill bottles to follow-up visits. Carry your medicine list with you in case of an emergency.    What you need to know about COVID-19 vaccines:  Healthcare providers recommend vaccination, even if you already had COVID-19.  Get a COVID-19 vaccine as directed.  Vaccination is recommended for everyone 6 months or older. COVID-19 vaccines are given as a shot in 1 to 3 doses as a primary series. This depends  on the vaccine brand and the age of the person who receives it. A booster dose is recommended for everyone 5 years or older after the primary series is complete. A second booster  is recommended for all adults 50 or older and for immunocompromised adolescents. The second booster is also recommended for anyone who got the 1-dose brand of vaccine for the first dose and a booster. Your provider can give you more information on boosters and help you schedule all needed doses.         Continue to protect yourself and others.  You can become infected even after you get the vaccine. You may also be able to pass the virus to others without knowing you are infected.    After you get the vaccine, check local, national, and international travel rules.  You may need to be tested before you travel. Some countries require proof of a negative test before you travel. You may also need to quarantine after you return.    Medicine may be given to prevent infection.  The medicine can be given if you are at high risk for infection and cannot get the vaccine. It can also be given if your immune system does not respond well to the vaccine.    How the 2019 coronavirus spreads:  Close personal contact with an infected person increases your risk for infection. This means being within 6 feet (2 meters) of the person for at least 15 minutes over 24 hours.  The virus travels in droplets that form when a person talks, sings, coughs, or sneezes.  The droplets can also float in the air for minutes or hours. Infection happens when you breathe in the droplets or get them in your eyes or nose.    Person-to-person contact can spread the virus.  For example, a person with the virus on his or her hands can spread it by shaking hands with someone.    The virus can stay on objects and surfaces for hours to days.  You may become infected by touching the object or surface and then touching your eyes or mouth.    Help lower your risk for COVID-19 during an  active outbreak:   Stay home if you are sick or think you may have COVID-19.  It is important to stay home if you are waiting for a testing appointment or for test results.    Wash your hands often throughout the day.  Use soap and water. Rub your soapy hands together, lacing your fingers, for at least 20 seconds. Rinse with warm, running water. Dry your hands with a clean towel or paper towel. Use hand  that contains alcohol if soap and water are not available. Teach children how to wash their hands and use hand .         Cover sneezes and coughs.  Turn your face away and cover your mouth and nose with a tissue. Throw the tissue away. Use the bend of your arm if a tissue is not available. Then wash your hands well with soap and water or use hand . Teach children how to cover a cough or sneeze.    Wear a face covering (mask) when needed.  Use a cloth covering with at least 2 layers. You can also create layers by putting a cloth covering over a disposable non-medical mask. Cover your mouth and your nose.         Try to keep space between you and others when you are out of the house.  Avoid crowds as much as possible. Wear a face covering when you must be around a large group and cannot keep space between you and others.    Clean and disinfect high-touch surfaces and objects often.  Use disinfecting wipes, or make a solution of 4 teaspoons of bleach in 1 quart (4 cups) of water.    Ask about other vaccines you may need.  Get the influenza (flu) vaccine as soon as recommended each year, usually starting in September or October. Get the pneumonia vaccine if recommended. Your healthcare provider can tell you if you should also get other vaccines, and when to get them.       Follow up with your doctor as directed:  Write down your questions so you remember to ask them during your visits.  For more information:   [facility] for Disease Control and Prevention  [address] [place]  [address]  Phone: [phone] [phone]  Web Address: http://www.[facility].gov    © Copyright Merative 2023 Information is for End User's use only and may not be sold, redistributed or otherwise used for commercial purposes.  The above information is an  only. It is not intended as medical advice for individual conditions or treatments. Talk to your doctor, nurse or pharmacist before following any medical regimen to see if it is safe and effective for you.

## 2024-05-10 ENCOUNTER — RA CDI HCC (OUTPATIENT)
Dept: OTHER | Facility: HOSPITAL | Age: 84
End: 2024-05-10

## 2024-05-17 ENCOUNTER — APPOINTMENT (OUTPATIENT)
Dept: LAB | Facility: CLINIC | Age: 84
End: 2024-05-17
Payer: MEDICARE

## 2024-05-17 ENCOUNTER — OFFICE VISIT (OUTPATIENT)
Dept: INTERNAL MEDICINE CLINIC | Facility: CLINIC | Age: 84
End: 2024-05-17
Payer: MEDICARE

## 2024-05-17 VITALS
DIASTOLIC BLOOD PRESSURE: 68 MMHG | TEMPERATURE: 97.5 F | WEIGHT: 119 LBS | SYSTOLIC BLOOD PRESSURE: 118 MMHG | OXYGEN SATURATION: 99 % | BODY MASS INDEX: 21.9 KG/M2 | HEIGHT: 62 IN | HEART RATE: 78 BPM

## 2024-05-17 DIAGNOSIS — M85.80 OSTEOPENIA, UNSPECIFIED LOCATION: ICD-10-CM

## 2024-05-17 DIAGNOSIS — M15.9 GENERALIZED OSTEOARTHRITIS: ICD-10-CM

## 2024-05-17 DIAGNOSIS — E78.2 MIXED HYPERLIPIDEMIA: ICD-10-CM

## 2024-05-17 DIAGNOSIS — I10 BENIGN ESSENTIAL HYPERTENSION: ICD-10-CM

## 2024-05-17 DIAGNOSIS — R00.2 PALPITATIONS: ICD-10-CM

## 2024-05-17 DIAGNOSIS — I10 BENIGN ESSENTIAL HYPERTENSION: Primary | ICD-10-CM

## 2024-05-17 DIAGNOSIS — R73.03 PREDIABETES: ICD-10-CM

## 2024-05-17 DIAGNOSIS — F41.1 GENERALIZED ANXIETY DISORDER: ICD-10-CM

## 2024-05-17 DIAGNOSIS — F32.0 MILD MAJOR DEPRESSION, SINGLE EPISODE (HCC): ICD-10-CM

## 2024-05-17 DIAGNOSIS — E28.39 MENOPAUSE OVARIAN FAILURE: ICD-10-CM

## 2024-05-17 DIAGNOSIS — Z00.00 MEDICARE ANNUAL WELLNESS VISIT, SUBSEQUENT: ICD-10-CM

## 2024-05-17 LAB
ALBUMIN SERPL BCP-MCNC: 4.4 G/DL (ref 3.5–5)
ALP SERPL-CCNC: 61 U/L (ref 34–104)
ALT SERPL W P-5'-P-CCNC: 19 U/L (ref 7–52)
ANION GAP SERPL CALCULATED.3IONS-SCNC: 10 MMOL/L (ref 4–13)
AST SERPL W P-5'-P-CCNC: 23 U/L (ref 13–39)
BASOPHILS # BLD AUTO: 0.07 THOUSANDS/ÂΜL (ref 0–0.1)
BASOPHILS NFR BLD AUTO: 1 % (ref 0–1)
BILIRUB SERPL-MCNC: 0.47 MG/DL (ref 0.2–1)
BUN SERPL-MCNC: 13 MG/DL (ref 5–25)
CALCIUM SERPL-MCNC: 9.5 MG/DL (ref 8.4–10.2)
CHLORIDE SERPL-SCNC: 95 MMOL/L (ref 96–108)
CHOLEST SERPL-MCNC: 182 MG/DL
CO2 SERPL-SCNC: 30 MMOL/L (ref 21–32)
CREAT SERPL-MCNC: 0.68 MG/DL (ref 0.6–1.3)
EOSINOPHIL # BLD AUTO: 0.12 THOUSAND/ÂΜL (ref 0–0.61)
EOSINOPHIL NFR BLD AUTO: 2 % (ref 0–6)
ERYTHROCYTE [DISTWIDTH] IN BLOOD BY AUTOMATED COUNT: 12.5 % (ref 11.6–15.1)
GFR SERPL CREATININE-BSD FRML MDRD: 82 ML/MIN/1.73SQ M
GLUCOSE P FAST SERPL-MCNC: 97 MG/DL (ref 65–99)
HCT VFR BLD AUTO: 41.3 % (ref 34.8–46.1)
HDLC SERPL-MCNC: 61 MG/DL
HGB BLD-MCNC: 14 G/DL (ref 11.5–15.4)
IMM GRANULOCYTES # BLD AUTO: 0.02 THOUSAND/UL (ref 0–0.2)
IMM GRANULOCYTES NFR BLD AUTO: 0 % (ref 0–2)
LDLC SERPL CALC-MCNC: 103 MG/DL (ref 0–100)
LYMPHOCYTES # BLD AUTO: 1.53 THOUSANDS/ÂΜL (ref 0.6–4.47)
LYMPHOCYTES NFR BLD AUTO: 22 % (ref 14–44)
MCH RBC QN AUTO: 30.1 PG (ref 26.8–34.3)
MCHC RBC AUTO-ENTMCNC: 33.9 G/DL (ref 31.4–37.4)
MCV RBC AUTO: 89 FL (ref 82–98)
MONOCYTES # BLD AUTO: 0.54 THOUSAND/ÂΜL (ref 0.17–1.22)
MONOCYTES NFR BLD AUTO: 8 % (ref 4–12)
NEUTROPHILS # BLD AUTO: 4.84 THOUSANDS/ÂΜL (ref 1.85–7.62)
NEUTS SEG NFR BLD AUTO: 67 % (ref 43–75)
NRBC BLD AUTO-RTO: 0 /100 WBCS
PLATELET # BLD AUTO: 294 THOUSANDS/UL (ref 149–390)
PMV BLD AUTO: 9.3 FL (ref 8.9–12.7)
POTASSIUM SERPL-SCNC: 4.5 MMOL/L (ref 3.5–5.3)
PROT SERPL-MCNC: 7.1 G/DL (ref 6.4–8.4)
RBC # BLD AUTO: 4.65 MILLION/UL (ref 3.81–5.12)
SODIUM SERPL-SCNC: 135 MMOL/L (ref 135–147)
TRIGL SERPL-MCNC: 88 MG/DL
TSH SERPL DL<=0.05 MIU/L-ACNC: 0.85 UIU/ML (ref 0.45–4.5)
WBC # BLD AUTO: 7.12 THOUSAND/UL (ref 4.31–10.16)

## 2024-05-17 PROCEDURE — G0439 PPPS, SUBSEQ VISIT: HCPCS | Performed by: INTERNAL MEDICINE

## 2024-05-17 PROCEDURE — 36415 COLL VENOUS BLD VENIPUNCTURE: CPT

## 2024-05-17 PROCEDURE — 99214 OFFICE O/P EST MOD 30 MIN: CPT | Performed by: INTERNAL MEDICINE

## 2024-05-17 PROCEDURE — 85025 COMPLETE CBC W/AUTO DIFF WBC: CPT

## 2024-05-17 PROCEDURE — 84443 ASSAY THYROID STIM HORMONE: CPT

## 2024-05-17 PROCEDURE — 80053 COMPREHEN METABOLIC PANEL: CPT

## 2024-05-17 PROCEDURE — 80061 LIPID PANEL: CPT

## 2024-05-17 NOTE — PROGRESS NOTES
Depression Screening and Follow-up Plan: Patient was screened for depression during today's encounter. They screened negative with a PHQ-9 score of 0.  Assessment/Plan:  Problem List Items Addressed This Visit          Cardiovascular and Mediastinum    Benign essential hypertension - Primary    Relevant Orders    CBC and differential    Comprehensive metabolic panel    Lipid Panel with Direct LDL reflex    TSH, 3rd generation       Musculoskeletal and Integument    Osteopenia    Relevant Orders    DXA bone density spine hip and pelvis    Generalized osteoarthritis       Behavioral Health    Mild major depression, single episode (HCC)    Generalized anxiety disorder       Other    Prediabetes    Palpitations    Relevant Orders    TSH, 3rd generation    Hyperlipidemia    Relevant Orders    Comprehensive metabolic panel    Lipid Panel with Direct LDL reflex    TSH, 3rd generation     Other Visit Diagnoses       Medicare annual wellness visit, subsequent        Menopause ovarian failure        Relevant Orders    DXA bone density spine hip and pelvis             Diagnoses and all orders for this visit:    Benign essential hypertension  -     CBC and differential; Future  -     Comprehensive metabolic panel; Future  -     Lipid Panel with Direct LDL reflex; Future  -     TSH, 3rd generation; Future    Generalized osteoarthritis    Osteopenia, unspecified location  -     DXA bone density spine hip and pelvis; Future    Generalized anxiety disorder    Medicare annual wellness visit, subsequent    Mild major depression, single episode (HCC)    Mixed hyperlipidemia  -     Comprehensive metabolic panel; Future  -     Lipid Panel with Direct LDL reflex; Future  -     TSH, 3rd generation; Future    Prediabetes    Palpitations  -     TSH, 3rd generation; Future    Menopause ovarian failure  -     DXA bone density spine hip and pelvis; Future        No problem-specific Assessment & Plan notes found for this encounter.    A/P:  Doing ok and will check labs. Order dexa. Continue current treatment and RTC four months for routine.     Subjective:      Patient ID: Blanca Shane is a 83 y.o. female.    WF RTC for f/u HTN, DJD, etc. Doing ok and no new issues. Remains active w/o difficulty and no falls. MDD/FARZAD is controlled. Due for labs and dexa.       The following portions of the patient's history were reviewed and updated as appropriate:   She has a past medical history of Arthritis, Generalized anxiety disorder, Hypertension, and Vaginal prolapse.,  does not have any pertinent problems on file.,   has a past surgical history that includes Colonoscopy; Hysterectomy; Bilateral oophorectomy; Bony pelvis surgery; Incontinence surgery; Bladder exstrophy closure; Eye surgery; and Cataract extraction.,  family history includes Arthritis in her mother; Depression in her mother; Diabetes in her family and father; Heart disease in her brother and mother; No Known Problems in her daughter, daughter, maternal aunt, maternal grandmother, paternal aunt, paternal aunt, and paternal grandmother; Prostate cancer in her father; Stroke in her mother; Substance Abuse in her mother.,   reports that she has never smoked. She has never been exposed to tobacco smoke. She has never used smokeless tobacco. She reports that she does not drink alcohol and does not use drugs.,  is allergic to aspirin and procaine..  Current Outpatient Medications   Medication Sig Dispense Refill   • Artificial Tear Ointment (DRY EYES OP) Administer 1 application to both eyes 2 (two) times a day     • Bioflavonoid Products (C COMPLEX PO) Take 1 tablet by mouth daily     • Calcium Carbonate-Vitamin D 600-125 MG-UNIT TABS Take 1 tablet by mouth 2 (two) times a day     • Coenzyme Q10 (CO Q-10) 200 MG CAPS Take by mouth     • Cranberry 425 MG CAPS Take by mouth     • docusate sodium (COLACE) 100 mg capsule Take 1 capsule by mouth     • Evening Primrose Oil 1000 MG CAPS Take by mouth      • Garlic 1000 MG CAPS Take by mouth     • Lutein 40 MG CAPS Take by mouth     • metoprolol succinate (TOPROL-XL) 50 mg 24 hr tablet TAKE 1 TABLET DAILY 90 tablet 3   • Multiple Vitamin (MULTI-VITAMIN DAILY PO) Take by mouth daily     • Omega-3 1000 MG CAPS Take 2 capsules by mouth 2 (two) times a day     • Turmeric 500 MG CAPS Take 1 capsule by mouth daily     • clotrimazole-betamethasone (LOTRISONE) 1-0.05 % cream Apply topically 2 (two) times a day 45 g 0     No current facility-administered medications for this visit.       Review of Systems   Constitutional:  Negative for activity change, chills, diaphoresis, fatigue and fever.   HENT: Negative.     Eyes:  Negative for visual disturbance.   Respiratory:  Negative for cough, chest tightness, shortness of breath and wheezing.    Cardiovascular:  Negative for chest pain, palpitations and leg swelling.   Gastrointestinal:  Negative for abdominal pain, constipation, diarrhea, nausea and vomiting.   Endocrine: Negative for cold intolerance and heat intolerance.   Genitourinary:  Negative for difficulty urinating, dysuria and frequency.   Musculoskeletal:  Negative for arthralgias, gait problem and myalgias.   Neurological:  Negative for dizziness, seizures, syncope, weakness, light-headedness and headaches.   Psychiatric/Behavioral:  Negative for confusion, dysphoric mood and sleep disturbance. The patient is not nervous/anxious.        PHQ-2/9 Depression Screening    Little interest or pleasure in doing things: 0 - not at all  Feeling down, depressed, or hopeless: 0 - not at all  Trouble falling or staying asleep, or sleeping too much: 0 - not at all  Feeling tired or having little energy: 0 - not at all  Poor appetite or overeatin - not at all  Feeling bad about yourself - or that you are a failure or have let yourself or your family down: 0 - not at all  Trouble concentrating on things, such as reading the newspaper or watching television: 0 - not at  "all  Moving or speaking so slowly that other people could have noticed. Or the opposite - being so fidgety or restless that you have been moving around a lot more than usual: 0 - not at all  Thoughts that you would be better off dead, or of hurting yourself in some way: 0 - not at all  PHQ-9 Score: 0  PHQ-9 Interpretation: No or Minimal depression        Objective:  Vitals:    05/17/24 0916   BP: 118/68   Pulse: 78   Temp: 97.5 °F (36.4 °C)   SpO2: 99%   Weight: 54 kg (119 lb)   Height: 5' 2\" (1.575 m)     Body mass index is 21.77 kg/m².     Physical Exam  Vitals and nursing note reviewed.   Constitutional:       General: She is not in acute distress.     Appearance: Normal appearance. She is not ill-appearing.   HENT:      Head: Normocephalic and atraumatic.      Mouth/Throat:      Mouth: Mucous membranes are moist.   Eyes:      Extraocular Movements: Extraocular movements intact.      Conjunctiva/sclera: Conjunctivae normal.      Pupils: Pupils are equal, round, and reactive to light.   Neck:      Vascular: No carotid bruit.   Cardiovascular:      Rate and Rhythm: Normal rate and regular rhythm.      Heart sounds: Normal heart sounds. No murmur heard.  Pulmonary:      Effort: Pulmonary effort is normal. No respiratory distress.      Breath sounds: Normal breath sounds. No wheezing, rhonchi or rales.   Abdominal:      General: Bowel sounds are normal. There is no distension.      Palpations: Abdomen is soft.      Tenderness: There is no abdominal tenderness.   Musculoskeletal:      Cervical back: Neck supple.      Right lower leg: No edema.      Left lower leg: No edema.   Neurological:      General: No focal deficit present.      Mental Status: She is alert and oriented to person, place, and time. Mental status is at baseline.   Psychiatric:         Mood and Affect: Mood normal.         Behavior: Behavior normal.         Thought Content: Thought content normal.         Judgment: Judgment normal.       "

## 2024-05-17 NOTE — PROGRESS NOTES
Ambulatory Visit  Name: Blanca Shane      : 1940      MRN: [id number]  Encounter Provider: Mau Mcneil DO  Encounter Date: 2024   Encounter department: [facility]    Assessment & Plan   1. Benign essential hypertension  -     CBC and differential; Future  -     Comprehensive metabolic panel; Future  -     Lipid Panel with Direct LDL reflex; Future  -     TSH, 3rd generation; Future  2. Generalized osteoarthritis  3. Osteopenia, unspecified location  -     DXA bone density spine hip and pelvis; Future  4. Generalized anxiety disorder  5. Medicare annual wellness visit, subsequent  6. Mild major depression, single episode (HCC)  7. Mixed hyperlipidemia  -     Comprehensive metabolic panel; Future  -     Lipid Panel with Direct LDL reflex; Future  -     TSH, 3rd generation; Future  8. Prediabetes  9. Palpitations  -     TSH, 3rd generation; Future  10. Menopause ovarian failure  -     DXA bone density spine hip and pelvis; Future       Preventive health issues were discussed with patient, and age appropriate screening tests were ordered as noted in patient's After Visit Summary. Personalized health advice and appropriate referrals for health education or preventive services given if needed, as noted in patient's After Visit Summary.    History of Present Illness     HPI   Patient Care Team:  Mau Mcneil DO as PCP - General (Internal Medicine)    Review of Systems  Medical History Reviewed by provider this encounter:       Annual Wellness Visit Questionnaire   Blanca is here for her Subsequent Wellness visit. Last Medicare Wellness visit information reviewed, patient interviewed and updates made to the record today.      Health Risk Assessment:   Patient rates overall health as good. Patient feels that their physical health rating is same. Patient is satisfied with their life. Eyesight was rated as same. Hearing was rated as same. Patient feels that their emotional and mental  health rating is same. Patients states they are never, rarely angry. Patient states they are never, rarely unusually tired/fatigued. Pain experienced in the last 7 days has been none. Patient states that she has experienced no weight loss or gain in last 6 months.     Depression Screening:   PHQ-9 Score: 0      Fall Risk Screening:   In the past year, patient has experienced: no history of falling in past year      Urinary Incontinence Screening:   Patient has not leaked urine accidently in the last six months.     Home Safety:  Patient does not have trouble with stairs inside or outside of their home. Patient has working smoke alarms and has working carbon monoxide detector. Home safety hazards include: none.     Nutrition:   Current diet is Regular.     Medications:   Patient is currently taking over-the-counter supplements. OTC medications include: see medication list. Patient is able to manage medications.     Activities of Daily Living (ADLs)/Instrumental Activities of Daily Living (IADLs):   Walk and transfer into and out of bed and chair?: Yes  Dress and groom yourself?: Yes    Bathe or shower yourself?: Yes    Feed yourself? Yes  Do your laundry/housekeeping?: Yes  Manage your money, pay your bills and track your expenses?: Yes  Make your own meals?: Yes    Do your own shopping?: Yes    Previous Hospitalizations:   Any hospitalizations or ED visits within the last 12 months?: No      Advance Care Planning:   Living will: Yes    Durable POA for healthcare: Yes    Advanced directive: Yes    Advanced directive counseling given: Yes    Five wishes given: No    Patient declined ACP directive: No    End of Life Decisions reviewed with patient: Yes    Provider agrees with end of life decisions: Yes      Cognitive Screening:   Provider or family/friend/caregiver concerned regarding cognition?: No    PREVENTIVE SCREENINGS      Cardiovascular Screening:    General: History Lipid Disorder and Screening Current    Due  for: Lipid Panel      Diabetes Screening:     General: Screening Current    Due for: Blood Glucose      Colorectal Cancer Screening:     General: Screening Not Indicated      Breast Cancer Screening:     General: Screening Current and Screening Not Indicated      Cervical Cancer Screening:    General: Screening Not Indicated      Osteoporosis Screening:    General: Screening Current      Abdominal Aortic Aneurysm (AAA) Screening:        General: Screening Not Indicated      Lung Cancer Screening:     General: Screening Not Indicated      Hepatitis C Screening:    General: Screening Current    Screening, Brief Intervention, and Referral to Treatment (SBIRT)    Screening  Typical number of drinks in a day: 0  Typical number of drinks in a week: 0  Interpretation: Low risk drinking behavior.    Single Item Drug Screening:  How often have you used an illegal drug (including marijuana) or a prescription medication for non-medical reasons in the past year? never    Single Item Drug Screen Score: 0  Interpretation: Negative screen for possible drug use disorder    Other Counseling Topics:   Car/seat belt/driving safety, sunscreen and calcium and vitamin D intake and regular weightbearing exercise.     Social Determinants of Health     Financial Resource Strain: Low Risk  (2/6/2023)    Overall Financial Resource Strain (CARDIA)     Difficulty of Paying Living Expenses: Not hard at all   Food Insecurity: No Food Insecurity (5/17/2024)    Hunger Vital Sign     Worried About Running Out of Food in the Last Year: Never true     Ran Out of Food in the Last Year: Never true   Transportation Needs: No Transportation Needs (5/17/2024)    PRAPARE - Transportation     Lack of Transportation (Medical): No     Lack of Transportation (Non-Medical): No   Housing Stability: Low Risk  (5/17/2024)    Housing Stability Vital Sign     Unable to Pay for Housing in the Last Year: No     Number of Times Moved in the Last Year: 0     Homeless in  "the Last Year: No   Utilities: Not At Risk (5/17/2024)    [facility] Utilities     Threatened with loss of utilities: No     No results found.    Objective     /68   Pulse 78   Temp 97.5 °F (36.4 °C)   Ht 5' 2\" (1.575 m)   Wt 54 kg (119 lb)   SpO2 99%   BMI 21.77 kg/m²     Physical Exam  Administrative Statements         A/P: Doing well and no falls reported. Denies depression and feels safe at home. Diverse diet. No problems operating a MV and uses seat belts. Has a living will and POA. No DME or referrals needed today. RTC one year for medicare wellness.    "

## 2024-05-17 NOTE — PATIENT INSTRUCTIONS
Medicare Preventive Visit Patient Instructions  Thank you for completing your Welcome to Medicare Visit or Medicare Annual Wellness Visit today. Your next wellness visit will be due in one year (5/18/2025).  The screening/preventive services that you may require over the next 5-10 years are detailed below. Some tests may not apply to you based off risk factors and/or age. Screening tests ordered at today's visit but not completed yet may show as past due. Also, please note that scanned in results may not display below.  Preventive Screenings:  Service Recommendations Previous Testing/Comments   Colorectal Cancer Screening  * Colonoscopy    * Fecal Occult Blood Test (FOBT)/Fecal Immunochemical Test (FIT)  * Fecal DNA/Cologuard Test  * Flexible Sigmoidoscopy Age: 45-75 years old   Colonoscopy: every 10 years (may be performed more frequently if at higher risk)  OR  FOBT/FIT: every 1 year  OR  Cologuard: every 3 years  OR  Sigmoidoscopy: every 5 years  Screening may be recommended earlier than age 45 if at higher risk for colorectal cancer. Also, an individualized decision between you and your healthcare provider will decide whether screening between the ages of 76-85 would be appropriate. Colonoscopy: Not on file  FOBT/FIT: Not on file  Cologuard: Not on file  Sigmoidoscopy: Not on file    Screening Not Indicated     Breast Cancer Screening Age: 40+ years old  Frequency: every 1-2 years  Not required if history of left and right mastectomy Mammogram: 05/03/2021    Screening Current  Screening Not Indicated   Cervical Cancer Screening Between the ages of 21-29, pap smear recommended once every 3 years.   Between the ages of 30-65, can perform pap smear with HPV co-testing every 5 years.   Recommendations may differ for women with a history of total hysterectomy, cervical cancer, or abnormal pap smears in past. Pap Smear: Not on file    Screening Not Indicated   Hepatitis C Screening Once for adults born between 1945  and 1965  More frequently in patients at high risk for Hepatitis C Hep C Antibody: 09/28/2021    Screening Current   Diabetes Screening 1-2 times per year if you're at risk for diabetes or have pre-diabetes Fasting glucose: 100 mg/dL (8/9/2023)  A1C: 5.8 % (8/9/2023)  Screening Current  Due for Blood Glucose   Cholesterol Screening Once every 5 years if you don't have a lipid disorder. May order more often based on risk factors. Lipid panel: 10/06/2022    History Lipid Disorder  Screening Current  Due for Lipid Panel     Other Preventive Screenings Covered by Medicare:  Abdominal Aortic Aneurysm (AAA) Screening: covered once if your at risk. You're considered to be at risk if you have a family history of AAA.  Lung Cancer Screening: covers low dose CT scan once per year if you meet all of the following conditions: (1) Age 55-77; (2) No signs or symptoms of lung cancer; (3) Current smoker or have quit smoking within the last 15 years; (4) You have a tobacco smoking history of at least 20 pack years (packs per day multiplied by number of years you smoked); (5) You get a written order from a healthcare provider.  Glaucoma Screening: covered annually if you're considered high risk: (1) You have diabetes OR (2) Family history of glaucoma OR (3)  aged 50 and older OR (4)  American aged 65 and older  Osteoporosis Screening: covered every 2 years if you meet one of the following conditions: (1) You're estrogen deficient and at risk for osteoporosis based off medical history and other findings; (2) Have a vertebral abnormality; (3) On glucocorticoid therapy for more than 3 months; (4) Have primary hyperparathyroidism; (5) On osteoporosis medications and need to assess response to drug therapy.   Last bone density test (DXA Scan): 05/03/2021.  HIV Screening: covered annually if you're between the age of 15-65. Also covered annually if you are younger than 15 and older than 65 with risk factors for HIV  infection. For pregnant patients, it is covered up to 3 times per pregnancy.    Immunizations:  Immunization Recommendations   Influenza Vaccine Annual influenza vaccination during flu season is recommended for all persons aged >= 6 months who do not have contraindications   Pneumococcal Vaccine   * Pneumococcal conjugate vaccine = PCV13 (Prevnar 13), PCV15 (Vaxneuvance), PCV20 (Prevnar 20)  * Pneumococcal polysaccharide vaccine = PPSV23 (Pneumovax) Adults 19-65 yo with certain risk factors or if 65+ yo  If never received any pneumonia vaccine: recommend Prevnar 20 (PCV20)  Give PCV20 if previously received 1 dose of PCV13 or PPSV23   Hepatitis B Vaccine 3 dose series if at intermediate or high risk (ex: diabetes, end stage renal disease, liver disease)   Respiratory syncytial virus (RSV) Vaccine - COVERED BY MEDICARE PART D  * RSVPreF3 (Arexvy) CDC recommends that adults 60 years of age and older may receive a single dose of RSV vaccine using shared clinical decision-making (SCDM)   Tetanus (Td) Vaccine - COST NOT COVERED BY MEDICARE PART B Following completion of primary series, a booster dose should be given every 10 years to maintain immunity against tetanus. Td may also be given as tetanus wound prophylaxis.   Tdap Vaccine - COST NOT COVERED BY MEDICARE PART B Recommended at least once for all adults. For pregnant patients, recommended with each pregnancy.   Shingles Vaccine (Shingrix) - COST NOT COVERED BY MEDICARE PART B  2 shot series recommended in those 19 years and older who have or will have weakened immune systems or those 50 years and older     Health Maintenance Due:      Topic Date Due   • Hepatitis C Screening  Completed     Immunizations Due:      Topic Date Due   • COVID-19 Vaccine (5 - 2023-24 season) 09/01/2023     Advance Directives   What are advance directives?  Advance directives are legal documents that state your wishes and plans for medical care. These plans are made ahead of time in case  you lose your ability to make decisions for yourself. Advance directives can apply to any medical decision, such as the treatments you want, and if you want to donate organs.   What are the types of advance directives?  There are many types of advance directives, and each state has rules about how to use them. You may choose a combination of any of the following:  Living will:  This is a written record of the treatment you want. You can also choose which treatments you do not want, which to limit, and which to stop at a certain time. This includes surgery, medicine, IV fluid, and tube feedings.   Durable power of  for healthcare (DPAHC):  This is a written record that states who you want to make healthcare choices for you when you are unable to make them for yourself. This person, called a proxy, is usually a family member or a friend. You may choose more than 1 proxy.  Do not resuscitate (DNR) order:  A DNR order is used in case your heart stops beating or you stop breathing. It is a request not to have certain forms of treatment, such as CPR. A DNR order may be included in other types of advance directives.  Medical directive:  This covers the care that you want if you are in a coma, near death, or unable to make decisions for yourself. You can list the treatments you want for each condition. Treatment may include pain medicine, surgery, blood transfusions, dialysis, IV or tube feedings, and a ventilator (breathing machine).  Values history:  This document has questions about your views, beliefs, and how you feel and think about life. This information can help others choose the care that you would choose.  Why are advance directives important?  An advance directive helps you control your care. Although spoken wishes may be used, it is better to have your wishes written down. Spoken wishes can be misunderstood, or not followed. Treatments may be given even if you do not want them. An advance directive may  make it easier for your family to make difficult choices about your care.       © Copyright [employer] 2018 Information is for End User's use only and may not be sold, redistributed or otherwise used for commercial purposes. All illustrations and images included in CareNotes® are the copyrighted property of [employer] or [employer]      Medicare Preventive Visit Patient Instructions  Thank you for completing your Welcome to Medicare Visit or Medicare Annual Wellness Visit today. Your next wellness visit will be due in one year (5/18/2025).  The screening/preventive services that you may require over the next 5-10 years are detailed below. Some tests may not apply to you based off risk factors and/or age. Screening tests ordered at today's visit but not completed yet may show as past due. Also, please note that scanned in results may not display below.  Preventive Screenings:  Service Recommendations Previous Testing/Comments   Colorectal Cancer Screening  * Colonoscopy    * Fecal Occult Blood Test (FOBT)/Fecal Immunochemical Test (FIT)  * Fecal DNA/Cologuard Test  * Flexible Sigmoidoscopy Age: 45-75 years old   Colonoscopy: every 10 years (may be performed more frequently if at higher risk)  OR  FOBT/FIT: every 1 year  OR  Cologuard: every 3 years  OR  Sigmoidoscopy: every 5 years  Screening may be recommended earlier than age 45 if at higher risk for colorectal cancer. Also, an individualized decision between you and your healthcare provider will decide whether screening between the ages of 76-85 would be appropriate. Colonoscopy: Not on file  FOBT/FIT: Not on file  Cologuard: Not on file  Sigmoidoscopy: Not on file    Screening Not Indicated     Breast Cancer Screening Age: 40+ years old  Frequency: every 1-2 years  Not required if history of left and right mastectomy Mammogram: 05/03/2021    Screening Current  Screening Not Indicated   Cervical Cancer Screening Between the ages of 21-29, pap smear  recommended once every 3 years.   Between the ages of 30-65, can perform pap smear with HPV co-testing every 5 years.   Recommendations may differ for women with a history of total hysterectomy, cervical cancer, or abnormal pap smears in past. Pap Smear: Not on file    Screening Not Indicated   Hepatitis C Screening Once for adults born between 1945 and 1965  More frequently in patients at high risk for Hepatitis C Hep C Antibody: 09/28/2021    Screening Current   Diabetes Screening 1-2 times per year if you're at risk for diabetes or have pre-diabetes Fasting glucose: 100 mg/dL (8/9/2023)  A1C: 5.8 % (8/9/2023)  Screening Current  Due for Blood Glucose   Cholesterol Screening Once every 5 years if you don't have a lipid disorder. May order more often based on risk factors. Lipid panel: 10/06/2022    History Lipid Disorder  Screening Current  Due for Lipid Panel     Other Preventive Screenings Covered by Medicare:  Abdominal Aortic Aneurysm (AAA) Screening: covered once if your at risk. You're considered to be at risk if you have a family history of AAA.  Lung Cancer Screening: covers low dose CT scan once per year if you meet all of the following conditions: (1) Age 55-77; (2) No signs or symptoms of lung cancer; (3) Current smoker or have quit smoking within the last 15 years; (4) You have a tobacco smoking history of at least 20 pack years (packs per day multiplied by number of years you smoked); (5) You get a written order from a healthcare provider.  Glaucoma Screening: covered annually if you're considered high risk: (1) You have diabetes OR (2) Family history of glaucoma OR (3)  aged 50 and older OR (4)  American aged 65 and older  Osteoporosis Screening: covered every 2 years if you meet one of the following conditions: (1) You're estrogen deficient and at risk for osteoporosis based off medical history and other findings; (2) Have a vertebral abnormality; (3) On glucocorticoid  therapy for more than 3 months; (4) Have primary hyperparathyroidism; (5) On osteoporosis medications and need to assess response to drug therapy.   Last bone density test (DXA Scan): 05/03/2021.  HIV Screening: covered annually if you're between the age of 15-65. Also covered annually if you are younger than 15 and older than 65 with risk factors for HIV infection. For pregnant patients, it is covered up to 3 times per pregnancy.    Immunizations:  Immunization Recommendations   Influenza Vaccine Annual influenza vaccination during flu season is recommended for all persons aged >= 6 months who do not have contraindications   Pneumococcal Vaccine   * Pneumococcal conjugate vaccine = PCV13 (Prevnar 13), PCV15 (Vaxneuvance), PCV20 (Prevnar 20)  * Pneumococcal polysaccharide vaccine = PPSV23 (Pneumovax) Adults 19-63 yo with certain risk factors or if 65+ yo  If never received any pneumonia vaccine: recommend Prevnar 20 (PCV20)  Give PCV20 if previously received 1 dose of PCV13 or PPSV23   Hepatitis B Vaccine 3 dose series if at intermediate or high risk (ex: diabetes, end stage renal disease, liver disease)   Respiratory syncytial virus (RSV) Vaccine - COVERED BY MEDICARE PART D  * RSVPreF3 (Arexvy) CDC recommends that adults 60 years of age and older may receive a single dose of RSV vaccine using shared clinical decision-making (SCDM)   Tetanus (Td) Vaccine - COST NOT COVERED BY MEDICARE PART B Following completion of primary series, a booster dose should be given every 10 years to maintain immunity against tetanus. Td may also be given as tetanus wound prophylaxis.   Tdap Vaccine - COST NOT COVERED BY MEDICARE PART B Recommended at least once for all adults. For pregnant patients, recommended with each pregnancy.   Shingles Vaccine (Shingrix) - COST NOT COVERED BY MEDICARE PART B  2 shot series recommended in those 19 years and older who have or will have weakened immune systems or those 50 years and older     Health  Maintenance Due:      Topic Date Due   • Hepatitis C Screening  Completed     Immunizations Due:      Topic Date Due   • COVID-19 Vaccine (5 - 2023-24 season) 09/01/2023     Advance Directives   What are advance directives?  Advance directives are legal documents that state your wishes and plans for medical care. These plans are made ahead of time in case you lose your ability to make decisions for yourself. Advance directives can apply to any medical decision, such as the treatments you want, and if you want to donate organs.   What are the types of advance directives?  There are many types of advance directives, and each state has rules about how to use them. You may choose a combination of any of the following:  Living will:  This is a written record of the treatment you want. You can also choose which treatments you do not want, which to limit, and which to stop at a certain time. This includes surgery, medicine, IV fluid, and tube feedings.   Durable power of  for healthcare (DPAHC):  This is a written record that states who you want to make healthcare choices for you when you are unable to make them for yourself. This person, called a proxy, is usually a family member or a friend. You may choose more than 1 proxy.  Do not resuscitate (DNR) order:  A DNR order is used in case your heart stops beating or you stop breathing. It is a request not to have certain forms of treatment, such as CPR. A DNR order may be included in other types of advance directives.  Medical directive:  This covers the care that you want if you are in a coma, near death, or unable to make decisions for yourself. You can list the treatments you want for each condition. Treatment may include pain medicine, surgery, blood transfusions, dialysis, IV or tube feedings, and a ventilator (breathing machine).  Values history:  This document has questions about your views, beliefs, and how you feel and think about life. This information can  help others choose the care that you would choose.  Why are advance directives important?  An advance directive helps you control your care. Although spoken wishes may be used, it is better to have your wishes written down. Spoken wishes can be misunderstood, or not followed. Treatments may be given even if you do not want them. An advance directive may make it easier for your family to make difficult choices about your care.       © Copyright [employer] 2018 Information is for End User's use only and may not be sold, redistributed or otherwise used for commercial purposes. All illustrations and images included in CareNotes® are the copyrighted property of [employer] or [employer]

## 2024-05-17 NOTE — PROGRESS NOTES
Ambulatory Visit  Name: Blanca Shane      : 1940      MRN: [id number]  Encounter Provider: Mau Mcneil DO  Encounter Date: 2024   Encounter department: [facility] [place]    Assessment & Plan   1. Benign essential hypertension  -     CBC and differential; Future  -     Comprehensive metabolic panel; Future  -     Lipid Panel with Direct LDL reflex; Future  -     TSH, 3rd generation; Future  2. Generalized osteoarthritis  3. Osteopenia, unspecified location  -     DXA bone density spine hip and pelvis; Future  4. Generalized anxiety disorder  5. Medicare annual wellness visit, subsequent  6. Mild major depression, single episode (HCC)  7. Mixed hyperlipidemia  -     Comprehensive metabolic panel; Future  -     Lipid Panel with Direct LDL reflex; Future  -     TSH, 3rd generation; Future  8. Prediabetes  9. Palpitations  -     TSH, 3rd generation; Future  10. Menopause ovarian failure  -     DXA bone density spine hip and pelvis; Future       Preventive health issues were discussed with patient, and age appropriate screening tests were ordered as noted in patient's After Visit Summary. Personalized health advice and appropriate referrals for health education or preventive services given if needed, as noted in patient's After Visit Summary.    History of Present Illness     HPI   Patient Care Team:  Mau Mcneil DO as PCP - General (Internal Medicine)    Review of Systems  Medical History Reviewed by provider this encounter:       Annual Wellness Visit Questionnaire   Annual Wellness Visit  Social Determinants of Health     Financial Resource Strain: Low Risk  (2023)    Overall Financial Resource Strain (CARDIA)    • Difficulty of Paying Living Expenses: Not hard at all   Food Insecurity: No Food Insecurity (2024)    Hunger Vital Sign    • Worried About Running Out of Food in the Last Year: Never true    • Ran Out of Food in the Last Year: Never true   Transportation Needs:  "No Transportation Needs (5/17/2024)    PRAPARE - Transportation    • Lack of Transportation (Medical): No    • Lack of Transportation (Non-Medical): No   Housing Stability: Low Risk  (5/17/2024)    Housing Stability Vital Sign    • Unable to Pay for Housing in the Last Year: No    • Number of Times Moved in the Last Year: 0    • Homeless in the Last Year: No   Utilities: Not At Risk (5/17/2024)    [facility] Utilities    • Threatened with loss of utilities: No     No results found.    Objective     /68   Pulse 78   Temp 97.5 °F (36.4 °C)   Ht 5' 2\" (1.575 m)   Wt 54 kg (119 lb)   SpO2 99%   BMI 21.77 kg/m²     Physical Exam  Administrative Statements           "

## 2024-05-20 ENCOUNTER — TELEPHONE (OUTPATIENT)
Dept: INTERNAL MEDICINE CLINIC | Facility: CLINIC | Age: 84
End: 2024-05-20

## 2024-11-15 DIAGNOSIS — R00.2 PALPITATIONS: ICD-10-CM

## 2024-11-15 DIAGNOSIS — F41.1 GENERALIZED ANXIETY DISORDER: ICD-10-CM

## 2024-11-15 DIAGNOSIS — I10 BENIGN ESSENTIAL HYPERTENSION: ICD-10-CM

## 2024-11-15 RX ORDER — METOPROLOL SUCCINATE 50 MG/1
50 TABLET, EXTENDED RELEASE ORAL DAILY
Qty: 90 TABLET | Refills: 0 | Status: SHIPPED | OUTPATIENT
Start: 2024-11-15

## 2024-11-18 ENCOUNTER — OFFICE VISIT (OUTPATIENT)
Dept: INTERNAL MEDICINE CLINIC | Facility: CLINIC | Age: 84
End: 2024-11-18
Payer: MEDICARE

## 2024-11-18 ENCOUNTER — APPOINTMENT (OUTPATIENT)
Dept: LAB | Facility: CLINIC | Age: 84
End: 2024-11-18
Payer: MEDICARE

## 2024-11-18 ENCOUNTER — RESULTS FOLLOW-UP (OUTPATIENT)
Dept: INTERNAL MEDICINE CLINIC | Facility: CLINIC | Age: 84
End: 2024-11-18

## 2024-11-18 VITALS
HEART RATE: 60 BPM | TEMPERATURE: 98 F | SYSTOLIC BLOOD PRESSURE: 124 MMHG | HEIGHT: 62 IN | OXYGEN SATURATION: 99 % | DIASTOLIC BLOOD PRESSURE: 60 MMHG | BODY MASS INDEX: 21.97 KG/M2 | WEIGHT: 119.4 LBS

## 2024-11-18 DIAGNOSIS — E78.2 MIXED HYPERLIPIDEMIA: ICD-10-CM

## 2024-11-18 DIAGNOSIS — I10 BENIGN ESSENTIAL HYPERTENSION: Primary | ICD-10-CM

## 2024-11-18 DIAGNOSIS — Z13.29 SCREENING FOR THYROID DISORDER: ICD-10-CM

## 2024-11-18 DIAGNOSIS — Z23 ENCOUNTER FOR IMMUNIZATION: ICD-10-CM

## 2024-11-18 DIAGNOSIS — I10 BENIGN ESSENTIAL HYPERTENSION: ICD-10-CM

## 2024-11-18 DIAGNOSIS — M85.80 OSTEOPENIA, UNSPECIFIED LOCATION: ICD-10-CM

## 2024-11-18 DIAGNOSIS — F32.0 MILD MAJOR DEPRESSION, SINGLE EPISODE (HCC): ICD-10-CM

## 2024-11-18 DIAGNOSIS — Z13.0 SCREENING FOR DEFICIENCY ANEMIA: ICD-10-CM

## 2024-11-18 DIAGNOSIS — R73.03 PREDIABETES: ICD-10-CM

## 2024-11-18 DIAGNOSIS — M15.9 GENERALIZED OSTEOARTHRITIS: ICD-10-CM

## 2024-11-18 DIAGNOSIS — Z13.220 SCREENING FOR LIPID DISORDERS: ICD-10-CM

## 2024-11-18 DIAGNOSIS — Z13.1 SCREENING FOR DIABETES MELLITUS (DM): ICD-10-CM

## 2024-11-18 DIAGNOSIS — F41.1 GENERALIZED ANXIETY DISORDER: ICD-10-CM

## 2024-11-18 LAB
ALBUMIN SERPL BCG-MCNC: 4.3 G/DL (ref 3.5–5)
ALP SERPL-CCNC: 63 U/L (ref 34–104)
ALT SERPL W P-5'-P-CCNC: 25 U/L (ref 7–52)
ANION GAP SERPL CALCULATED.3IONS-SCNC: 8 MMOL/L (ref 4–13)
AST SERPL W P-5'-P-CCNC: 28 U/L (ref 13–39)
BILIRUB SERPL-MCNC: 0.42 MG/DL (ref 0.2–1)
BUN SERPL-MCNC: 12 MG/DL (ref 5–25)
CALCIUM SERPL-MCNC: 9.3 MG/DL (ref 8.4–10.2)
CHLORIDE SERPL-SCNC: 98 MMOL/L (ref 96–108)
CO2 SERPL-SCNC: 32 MMOL/L (ref 21–32)
CREAT SERPL-MCNC: 0.7 MG/DL (ref 0.6–1.3)
CREAT UR-MCNC: 31.4 MG/DL
EST. AVERAGE GLUCOSE BLD GHB EST-MCNC: 120 MG/DL
GFR SERPL CREATININE-BSD FRML MDRD: 80 ML/MIN/1.73SQ M
GLUCOSE P FAST SERPL-MCNC: 99 MG/DL (ref 65–99)
HBA1C MFR BLD: 5.8 %
LDLC SERPL DIRECT ASSAY-MCNC: 103 MG/DL (ref 0–100)
MICROALBUMIN UR-MCNC: <7 MG/L
POTASSIUM SERPL-SCNC: 4.4 MMOL/L (ref 3.5–5.3)
PROT SERPL-MCNC: 6.8 G/DL (ref 6.4–8.4)
SODIUM SERPL-SCNC: 138 MMOL/L (ref 135–147)
TRIGL SERPL-MCNC: 115 MG/DL (ref ?–150)

## 2024-11-18 PROCEDURE — 99214 OFFICE O/P EST MOD 30 MIN: CPT | Performed by: INTERNAL MEDICINE

## 2024-11-18 PROCEDURE — 82043 UR ALBUMIN QUANTITATIVE: CPT

## 2024-11-18 PROCEDURE — 36415 COLL VENOUS BLD VENIPUNCTURE: CPT

## 2024-11-18 PROCEDURE — 90662 IIV NO PRSV INCREASED AG IM: CPT

## 2024-11-18 PROCEDURE — 82570 ASSAY OF URINE CREATININE: CPT

## 2024-11-18 PROCEDURE — 83036 HEMOGLOBIN GLYCOSYLATED A1C: CPT

## 2024-11-18 PROCEDURE — 80053 COMPREHEN METABOLIC PANEL: CPT

## 2024-11-18 PROCEDURE — 83721 ASSAY OF BLOOD LIPOPROTEIN: CPT

## 2024-11-18 PROCEDURE — G0008 ADMIN INFLUENZA VIRUS VAC: HCPCS

## 2024-11-18 PROCEDURE — 84478 ASSAY OF TRIGLYCERIDES: CPT

## 2024-11-18 NOTE — PATIENT INSTRUCTIONS
"Patient Education     High blood pressure in adults   The Basics   Written by the doctors and editors at [facility]   What is high blood pressure? -- High blood pressure is a condition that puts you at risk for heart attack, stroke, and kidney disease. It does not usually cause symptoms. But it can be serious.  When your doctor or nurse tells you your blood pressure, they say 2 numbers. For instance, your doctor or nurse might say that your blood pressure is \"130 over 80.\" The top number is the pressure inside your arteries when your heart is tanja. The bottom number is the pressure inside your arteries when your heart is relaxed.  \"Elevated blood pressure\" is a term doctors or nurses use as a warning. People with elevated blood pressure do not yet have high blood pressure. But their blood pressure is not as low as it should be for good health.  Many experts define high, elevated, and normal blood pressure as follows:   High - Top number of 130 or above and/or bottom number of 80 or above.   Elevated - Top number between 120 and 129 and bottom number of 79 or below.   Normal - Top number of 119 or below and bottom number of 79 or below.  This information is also in the table (table 1).  How can I lower my blood pressure? -- If your doctor or nurse prescribed blood pressure medicine, the most important thing you can do is to take it. If it causes side effects, do not just stop taking it. Instead, talk to your doctor or nurse about the problems it causes. They might be able to lower your dose or switch you to another medicine. If cost is a problem, mention that, too. They might be able to put you on a less expensive medicine. Taking your blood pressure medicine can keep you from having a heart attack or stroke, and it can save your life!  Can I do anything on my own? -- You have a lot of control over your blood pressure. To lower it:   Lose weight (if you are overweight).   Choose a diet low in fat and rich in " "fruits, vegetables, and low-fat dairy products.   Eat less salt.   Do something active for at least 30 minutes a day on most days of the week.   Drink less alcohol (if you drink more than 2 alcoholic drinks per day).  It's also a good idea to get a home blood pressure meter. People who check their own blood pressure at home do better at keeping it low and can sometimes even reduce the amount of medicine they take.  All topics are updated as new evidence becomes available and our peer review process is complete.  This topic retrieved from [employer] on: Feb 26, 2024.  Topic 62851 Version 23.0  Release: 32.2.4 - C32.56  © 2024 UpToDate, Inc. and/or its affiliates. All rights reserved.  table 1: Definition of normal and high blood pressure  Level  Top number  Bottom number    High 130 or above 80 or above   Elevated 120 to 129 79 or below   Normal 119 or below 79 or below   These definitions are from the American College of Cardiology/American Heart Association. Other expert groups might use slightly different definitions.  \"Elevated blood pressure\" is a term doctor or nurses use as a warning. It means you do not yet have high blood pressure, but your blood pressure is not as low as it should be for good health.  Graphic 97456 Version 6.0  Consumer Information Use and Disclaimer   Disclaimer: This generalized information is a limited summary of diagnosis, treatment, and/or medication information. It is not meant to be comprehensive and should be used as a tool to help the user understand and/or assess potential diagnostic and treatment options. It does NOT include all information about conditions, treatments, medications, side effects, or risks that may apply to a specific patient. It is not intended to be medical advice or a substitute for the medical advice, diagnosis, or treatment of a health care provider based on the health care provider's examination and assessment of a patient's specific and unique circumstances. " Patients must speak with a health care provider for complete information about their health, medical questions, and treatment options, including any risks or benefits regarding use of medications. This information does not endorse any treatments or medications as safe, effective, or approved for treating a specific patient. UpToDate, Inc. and its affiliates disclaim any warranty or liability relating to this information or the use thereof.The use of this information is governed by the Terms of Use, available at https://www.woltersDokDokuwer.com/en/know/clinical-effectiveness-terms. 2024© UpToDate, Inc. and its affiliates and/or licensors. All rights reserved.  Copyright   © 2024 UpToDate, Inc. and/or its affiliates. All rights reserved.

## 2024-11-18 NOTE — PROGRESS NOTES
Name: Blanca Shane      : 1940      MRN: [id number]  Encounter Provider: Mau Mcneil DO  Encounter Date: 2024   Encounter department: [facility]  :  Assessment & Plan  Encounter for immunization    Orders:    influenza vaccine, high-dose, PF 0.5 mL (Fluzone High Dose)    Benign essential hypertension    Orders:    Albumin / creatinine urine ratio; Future    Comprehensive metabolic panel; Future    Osteopenia, unspecified location         Generalized osteoarthritis    Orders:    Comprehensive metabolic panel; Future    Mild major depression, single episode (HCC)           Generalized anxiety disorder         Prediabetes    Orders:    Hemoglobin A1C; Future    Mixed hyperlipidemia    Orders:    Comprehensive metabolic panel; Future    LDL cholesterol, direct; Future    Triglycerides; Future    Screening for lipid disorders         Screening for diabetes mellitus (DM)         Screening for deficiency anemia         Screening for thyroid disorder           A/P: Doing ok and will check labs. Discussed vaccines will up date her flu.. Continue current treatment and RTC six months for routine.     Depression Screening and Follow-up Plan: Patient was screened for depression during today's encounter. They screened negative with a PHQ-9 score of 0.      History of Present Illness     WF RTC for f/u HTN, DJD, etc. Doing ok and no new issues. Remains active w/o difficulty and no falls. Chronic pain is manageable. MDD/FARZAD is controlled. Due for labs and vaccines.       Review of Systems   Constitutional:  Negative for activity change, chills, diaphoresis, fatigue and fever.   HENT: Negative.     Eyes:  Negative for visual disturbance.   Respiratory:  Negative for cough, chest tightness, shortness of breath and wheezing.    Cardiovascular:  Negative for chest pain, palpitations and leg swelling.   Gastrointestinal:  Negative for abdominal pain, constipation, diarrhea, nausea and vomiting.  "  Endocrine: Negative for cold intolerance and heat intolerance.   Genitourinary:  Negative for difficulty urinating, dysuria and frequency.   Musculoskeletal:  Negative for arthralgias, gait problem and myalgias.   Neurological:  Negative for dizziness, seizures, syncope, weakness, light-headedness and headaches.   Psychiatric/Behavioral:  Negative for confusion, dysphoric mood and sleep disturbance. The patient is not nervous/anxious.           Objective   Ht 5' 2\" (1.575 m)   Wt 54.2 kg (119 lb 6.4 oz)   BMI 21.84 kg/m²      Physical Exam  Vitals and nursing note reviewed.   Constitutional:       General: She is not in acute distress.     Appearance: Normal appearance. She is not ill-appearing.   HENT:      Head: Normocephalic and atraumatic.      Mouth/Throat:      Mouth: Mucous membranes are moist.   Eyes:      Extraocular Movements: Extraocular movements intact.      Conjunctiva/sclera: Conjunctivae normal.      Pupils: Pupils are equal, round, and reactive to light.   Neck:      Vascular: No carotid bruit.   Cardiovascular:      Rate and Rhythm: Normal rate and regular rhythm.      Heart sounds: Normal heart sounds. No murmur heard.  Pulmonary:      Effort: Pulmonary effort is normal. No respiratory distress.      Breath sounds: Normal breath sounds. No wheezing, rhonchi or rales.   Abdominal:      General: Bowel sounds are normal. There is no distension.      Palpations: Abdomen is soft.      Tenderness: There is no abdominal tenderness.   Musculoskeletal:      Cervical back: Neck supple.      Right lower leg: No edema.      Left lower leg: No edema.   Neurological:      General: No focal deficit present.      Mental Status: She is alert and oriented to person, place, and time. Mental status is at baseline.   Psychiatric:         Mood and Affect: Mood normal.         Behavior: Behavior normal.         Thought Content: Thought content normal.         Judgment: Judgment normal.         "

## 2025-02-13 DIAGNOSIS — R00.2 PALPITATIONS: ICD-10-CM

## 2025-02-13 DIAGNOSIS — F41.1 GENERALIZED ANXIETY DISORDER: ICD-10-CM

## 2025-02-13 DIAGNOSIS — I10 BENIGN ESSENTIAL HYPERTENSION: ICD-10-CM

## 2025-02-13 RX ORDER — METOPROLOL SUCCINATE 50 MG/1
50 TABLET, EXTENDED RELEASE ORAL DAILY
Qty: 90 TABLET | Refills: 1 | Status: SHIPPED | OUTPATIENT
Start: 2025-02-13

## 2025-05-19 ENCOUNTER — OFFICE VISIT (OUTPATIENT)
Dept: INTERNAL MEDICINE CLINIC | Facility: CLINIC | Age: 85
End: 2025-05-19
Payer: MEDICARE

## 2025-05-19 ENCOUNTER — APPOINTMENT (OUTPATIENT)
Dept: LAB | Facility: CLINIC | Age: 85
End: 2025-05-19
Attending: INTERNAL MEDICINE
Payer: MEDICARE

## 2025-05-19 VITALS
TEMPERATURE: 97.9 F | WEIGHT: 117.8 LBS | HEIGHT: 62 IN | OXYGEN SATURATION: 99 % | HEART RATE: 62 BPM | BODY MASS INDEX: 21.68 KG/M2 | DIASTOLIC BLOOD PRESSURE: 84 MMHG | SYSTOLIC BLOOD PRESSURE: 136 MMHG

## 2025-05-19 DIAGNOSIS — R73.03 PREDIABETES: ICD-10-CM

## 2025-05-19 DIAGNOSIS — Z00.00 MEDICARE ANNUAL WELLNESS VISIT, SUBSEQUENT: ICD-10-CM

## 2025-05-19 DIAGNOSIS — I10 BENIGN ESSENTIAL HYPERTENSION: Primary | ICD-10-CM

## 2025-05-19 DIAGNOSIS — F41.1 GENERALIZED ANXIETY DISORDER: ICD-10-CM

## 2025-05-19 DIAGNOSIS — E28.39 MENOPAUSE OVARIAN FAILURE: ICD-10-CM

## 2025-05-19 DIAGNOSIS — F32.0 MILD MAJOR DEPRESSION, SINGLE EPISODE (HCC): ICD-10-CM

## 2025-05-19 DIAGNOSIS — E78.2 MIXED HYPERLIPIDEMIA: ICD-10-CM

## 2025-05-19 DIAGNOSIS — M85.80 OSTEOPENIA, UNSPECIFIED LOCATION: ICD-10-CM

## 2025-05-19 DIAGNOSIS — M15.9 GENERALIZED OSTEOARTHRITIS: ICD-10-CM

## 2025-05-19 DIAGNOSIS — I10 BENIGN ESSENTIAL HYPERTENSION: ICD-10-CM

## 2025-05-19 LAB
ALBUMIN SERPL BCG-MCNC: 4.4 G/DL (ref 3.5–5)
ALP SERPL-CCNC: 64 U/L (ref 34–104)
ALT SERPL W P-5'-P-CCNC: 17 U/L (ref 7–52)
ANION GAP SERPL CALCULATED.3IONS-SCNC: 6 MMOL/L (ref 4–13)
AST SERPL W P-5'-P-CCNC: 23 U/L (ref 13–39)
BASOPHILS # BLD AUTO: 0.07 THOUSANDS/ÂΜL (ref 0–0.1)
BASOPHILS NFR BLD AUTO: 1 % (ref 0–1)
BILIRUB SERPL-MCNC: 0.53 MG/DL (ref 0.2–1)
BUN SERPL-MCNC: 12 MG/DL (ref 5–25)
CALCIUM SERPL-MCNC: 9.3 MG/DL (ref 8.4–10.2)
CHLORIDE SERPL-SCNC: 96 MMOL/L (ref 96–108)
CHOLEST SERPL-MCNC: 186 MG/DL (ref ?–200)
CO2 SERPL-SCNC: 32 MMOL/L (ref 21–32)
CREAT SERPL-MCNC: 0.75 MG/DL (ref 0.6–1.3)
CREAT UR-MCNC: 44.5 MG/DL
EOSINOPHIL # BLD AUTO: 0.09 THOUSAND/ÂΜL (ref 0–0.61)
EOSINOPHIL NFR BLD AUTO: 2 % (ref 0–6)
ERYTHROCYTE [DISTWIDTH] IN BLOOD BY AUTOMATED COUNT: 12 % (ref 11.6–15.1)
EST. AVERAGE GLUCOSE BLD GHB EST-MCNC: 111 MG/DL
GFR SERPL CREATININE-BSD FRML MDRD: 74 ML/MIN/1.73SQ M
GLUCOSE P FAST SERPL-MCNC: 99 MG/DL (ref 65–99)
HBA1C MFR BLD: 5.5 %
HCT VFR BLD AUTO: 40 % (ref 34.8–46.1)
HDLC SERPL-MCNC: 62 MG/DL
HGB BLD-MCNC: 13.4 G/DL (ref 11.5–15.4)
IMM GRANULOCYTES # BLD AUTO: 0.02 THOUSAND/UL (ref 0–0.2)
IMM GRANULOCYTES NFR BLD AUTO: 0 % (ref 0–2)
LDLC SERPL CALC-MCNC: 105 MG/DL (ref 0–100)
LYMPHOCYTES # BLD AUTO: 1.67 THOUSANDS/ÂΜL (ref 0.6–4.47)
LYMPHOCYTES NFR BLD AUTO: 27 % (ref 14–44)
MCH RBC QN AUTO: 29.8 PG (ref 26.8–34.3)
MCHC RBC AUTO-ENTMCNC: 33.5 G/DL (ref 31.4–37.4)
MCV RBC AUTO: 89 FL (ref 82–98)
MICROALBUMIN UR-MCNC: 7.7 MG/L
MICROALBUMIN/CREAT 24H UR: 17 MG/G CREATININE (ref 0–30)
MONOCYTES # BLD AUTO: 0.47 THOUSAND/ÂΜL (ref 0.17–1.22)
MONOCYTES NFR BLD AUTO: 8 % (ref 4–12)
NEUTROPHILS # BLD AUTO: 3.81 THOUSANDS/ÂΜL (ref 1.85–7.62)
NEUTS SEG NFR BLD AUTO: 62 % (ref 43–75)
NRBC BLD AUTO-RTO: 0 /100 WBCS
PLATELET # BLD AUTO: 276 THOUSANDS/UL (ref 149–390)
PMV BLD AUTO: 9.2 FL (ref 8.9–12.7)
POTASSIUM SERPL-SCNC: 4 MMOL/L (ref 3.5–5.3)
PROT SERPL-MCNC: 7.2 G/DL (ref 6.4–8.4)
RBC # BLD AUTO: 4.49 MILLION/UL (ref 3.81–5.12)
SODIUM SERPL-SCNC: 134 MMOL/L (ref 135–147)
TRIGL SERPL-MCNC: 95 MG/DL (ref ?–150)
TSH SERPL DL<=0.05 MIU/L-ACNC: 0.9 UIU/ML (ref 0.45–4.5)
WBC # BLD AUTO: 6.13 THOUSAND/UL (ref 4.31–10.16)

## 2025-05-19 PROCEDURE — 85025 COMPLETE CBC W/AUTO DIFF WBC: CPT

## 2025-05-19 PROCEDURE — 80061 LIPID PANEL: CPT

## 2025-05-19 PROCEDURE — 82043 UR ALBUMIN QUANTITATIVE: CPT

## 2025-05-19 PROCEDURE — 36415 COLL VENOUS BLD VENIPUNCTURE: CPT

## 2025-05-19 PROCEDURE — 82570 ASSAY OF URINE CREATININE: CPT

## 2025-05-19 PROCEDURE — G0439 PPPS, SUBSEQ VISIT: HCPCS | Performed by: INTERNAL MEDICINE

## 2025-05-19 PROCEDURE — 83036 HEMOGLOBIN GLYCOSYLATED A1C: CPT

## 2025-05-19 PROCEDURE — 99214 OFFICE O/P EST MOD 30 MIN: CPT | Performed by: INTERNAL MEDICINE

## 2025-05-19 PROCEDURE — G2211 COMPLEX E/M VISIT ADD ON: HCPCS | Performed by: INTERNAL MEDICINE

## 2025-05-19 PROCEDURE — 84443 ASSAY THYROID STIM HORMONE: CPT

## 2025-05-19 PROCEDURE — 80053 COMPREHEN METABOLIC PANEL: CPT

## 2025-05-19 NOTE — PROGRESS NOTES
Name: Blanca Shane      : 1940      MRN: [id number]  Encounter Provider: Mau Mcneil DO  Encounter Date: 2025   Encounter department: [facility]  :  Assessment & Plan  Benign essential hypertension    Orders:    Albumin / creatinine urine ratio; Future    CBC and differential; Future    Comprehensive metabolic panel; Future    Generalized osteoarthritis    Orders:    CBC and differential; Future    Comprehensive metabolic panel; Future    Osteopenia, unspecified location         Mild major depression, single episode (HCC)      Orders:    TSH, 3rd generation; Future    Generalized anxiety disorder    Orders:    TSH, 3rd generation; Future    Mixed hyperlipidemia    Orders:    Comprehensive metabolic panel; Future    Lipid Panel with Direct LDL reflex; Future    Prediabetes    Orders:    Hemoglobin A1C; Future    Medicare annual wellness visit, subsequent         Menopause ovarian failure            Preventive health issues were discussed with patient, and age appropriate screening tests were ordered as noted in patient's After Visit Summary. Personalized health advice and appropriate referrals for health education or preventive services given if needed, as noted in patient's After Visit Summary.    History of Present Illness     HPI   Patient Care Team:  Mau Mcneil DO as PCP - General (Internal Medicine)    Review of Systems  Medical History Reviewed by provider this encounter:  Tobacco  Allergies  Meds  Problems  Med Hx  Surg Hx  Fam Hx       Annual Wellness Visit Questionnaire   Blanca is here for her Subsequent Wellness visit. Last Medicare Wellness visit information reviewed, patient interviewed and updates made to the record today.      Health Risk Assessment:   Patient rates overall health as good. Patient feels that their physical health rating is slightly worse. Patient is satisfied with their life. Eyesight was rated as slightly worse. Hearing was rated as  same. Patient feels that their emotional and mental health rating is same. Patients states they are often angry. Patient states they are often unusually tired/fatigued. Pain experienced in the last 7 days has been some. Patient's pain rating has been 5/10. Patient states that she has experienced no weight loss or gain in last 6 months.     Depression Screening:   PHQ-9 Score: 0      Fall Risk Screening:   In the past year, patient has experienced: no history of falling in past year      Urinary Incontinence Screening:   Patient has leaked urine accidently in the last six months.     Home Safety:  Patient does not have trouble with stairs inside or outside of their home. Patient has working smoke alarms and has working carbon monoxide detector. Home safety hazards include: none.     Nutrition:   Current diet is Regular.     Medications:   Patient is currently taking over-the-counter supplements. OTC medications include: see medication list. Patient is able to manage medications.     Activities of Daily Living (ADLs)/Instrumental Activities of Daily Living (IADLs):   Walk and transfer into and out of bed and chair?: Yes  Dress and groom yourself?: Yes    Bathe or shower yourself?: Yes    Feed yourself? Yes  Do your laundry/housekeeping?: Yes  Manage your money, pay your bills and track your expenses?: Yes  Make your own meals?: Yes    Do your own shopping?: Yes    Previous Hospitalizations:   Any hospitalizations or ED visits within the last 12 months?: No      Advance Care Planning:   Living will: Yes    Durable POA for healthcare: Yes    Advanced directive: Yes    Advanced directive counseling given: Yes    Five wishes given: No    Patient declined ACP directive: Yes    End of Life Decisions reviewed with patient: Yes    Provider agrees with end of life decisions: Yes      Cognitive Screening:   Provider or family/friend/caregiver concerned regarding cognition?: No    Preventive Screenings      Cardiovascular  Screening:    General: History Lipid Disorder    Due for: Lipid Panel      Diabetes Screening:     General: Screening Current    Due for: Blood Glucose      Colorectal Cancer Screening:     General: Screening Not Indicated      Breast Cancer Screening:     General: Screening Not Indicated      Cervical Cancer Screening:    General: Screening Not Indicated      Osteoporosis Screening:      Due for: DXA Appendicular      Abdominal Aortic Aneurysm (AAA) Screening:        General: Screening Not Indicated      Lung Cancer Screening:     General: Screening Not Indicated      Hepatitis C Screening:    General: Screening Current    Immunizations:  - Immunizations due: Zoster (Shingrix)    Screening, Brief Intervention, and Referral to Treatment (SBIRT)     Screening  Typical number of drinks in a day: 0  Typical number of drinks in a week: 0  Interpretation: Low risk drinking behavior.    Single Item Drug Screening:  How often have you used an illegal drug (including marijuana) or a prescription medication for non-medical reasons in the past year? never    Single Item Drug Screen Score: 0  Interpretation: Negative screen for possible drug use disorder    Other Counseling Topics:   Car/seat belt/driving safety, sunscreen and calcium and vitamin D intake and regular weightbearing exercise.     Social Drivers of Health     Financial Resource Strain: Low Risk  (2/6/2023)    Overall Financial Resource Strain (CARDIA)     Difficulty of Paying Living Expenses: Not hard at all   Food Insecurity: No Food Insecurity (5/19/2025)    Nursing - Inadequate Food Risk Classification     Worried About Running Out of Food in the Last Year: Never true     Ran Out of Food in the Last Year: Never true   Transportation Needs: No Transportation Needs (5/19/2025)    PRAPARE - Transportation     Lack of Transportation (Medical): No     Lack of Transportation (Non-Medical): No   Housing Stability: Low Risk  (5/19/2025)    Housing Stability Vital Sign  "    Unable to Pay for Housing in the Last Year: No     Number of Times Moved in the Last Year: 0     Homeless in the Last Year: No   Utilities: Not At Risk (5/19/2025)    [facility] Utilities     Threatened with loss of utilities: No     No results found.    Objective   /84   Pulse 62   Temp 97.9 °F (36.6 °C)   Ht 5' 2\" (1.575 m)   Wt 53.4 kg (117 lb 12.8 oz)   SpO2 99%   BMI 21.55 kg/m²     Physical Exam      .[email or web address]    "

## 2025-05-19 NOTE — PROGRESS NOTES
Name: Blanca Shane      : 1940      MRN: [id number]  Encounter Provider: Mau Mcneil DO  Encounter Date: 2025   Encounter department: [facility]  :  Assessment & Plan  Benign essential hypertension    Orders:    Albumin / creatinine urine ratio; Future    CBC and differential; Future    Comprehensive metabolic panel; Future    Generalized osteoarthritis    Orders:    CBC and differential; Future    Comprehensive metabolic panel; Future    Osteopenia, unspecified location    Orders:    DXA bone density spine hip and pelvis; Future    Mild major depression, single episode (HCC)      Orders:    TSH, 3rd generation; Future    Generalized anxiety disorder    Orders:    TSH, 3rd generation; Future    Mixed hyperlipidemia    Orders:    Comprehensive metabolic panel; Future    Lipid Panel with Direct LDL reflex; Future    Prediabetes    Orders:    Hemoglobin A1C; Future    Medicare annual wellness visit, subsequent         Menopause ovarian failure    Orders:    DXA bone density spine hip and pelvis; Future    A/P: Doing ok and will check labs. ?fatigue. Await labs. No s/s of MALIHA and MDD controlled. Order dexa. Continue current treatment and RTC six months for routine.        History of Present Illness   WF RTC for f/u HTN, DJD, etc.  Doing ok and no new issues,but still with fatigue. . Remains active w/o difficulty and no falls. Chronic pain is manageable. MDD/FARZAD are controlled. Due for labs.       Review of Systems   Constitutional:  Positive for fatigue. Negative for activity change, chills, diaphoresis and fever.   HENT: Negative.     Eyes:  Negative for visual disturbance.   Respiratory:  Negative for cough, chest tightness, shortness of breath and wheezing.    Cardiovascular:  Negative for chest pain, palpitations and leg swelling.   Gastrointestinal:  Negative for abdominal pain, constipation, diarrhea, nausea and vomiting.   Endocrine: Negative for cold intolerance and heat  "intolerance.   Genitourinary:  Negative for difficulty urinating, dysuria and frequency.   Musculoskeletal:  Negative for arthralgias, gait problem and myalgias.   Neurological:  Negative for dizziness, seizures, syncope, weakness, light-headedness and headaches.   Psychiatric/Behavioral:  Negative for confusion, dysphoric mood and sleep disturbance. The patient is not nervous/anxious.        Objective   /84   Pulse 62   Temp 97.9 °F (36.6 °C)   Ht 5' 2\" (1.575 m)   Wt 53.4 kg (117 lb 12.8 oz)   SpO2 99%   BMI 21.55 kg/m²      Physical Exam  Vitals and nursing note reviewed.   Constitutional:       General: She is not in acute distress.     Appearance: Normal appearance. She is not ill-appearing.   HENT:      Head: Normocephalic and atraumatic.      Mouth/Throat:      Mouth: Mucous membranes are moist.     Eyes:      Extraocular Movements: Extraocular movements intact.      Conjunctiva/sclera: Conjunctivae normal.      Pupils: Pupils are equal, round, and reactive to light.     Neck:      Vascular: No carotid bruit.     Cardiovascular:      Rate and Rhythm: Normal rate and regular rhythm.      Heart sounds: Normal heart sounds. No murmur heard.  Pulmonary:      Effort: Pulmonary effort is normal. No respiratory distress.      Breath sounds: Normal breath sounds. No wheezing, rhonchi or rales.   Abdominal:      General: There is no distension.      Palpations: Abdomen is soft.      Tenderness: There is no abdominal tenderness.     Musculoskeletal:      Cervical back: Neck supple.      Right lower leg: No edema.      Left lower leg: No edema.     Neurological:      General: No focal deficit present.      Mental Status: She is alert and oriented to person, place, and time. Mental status is at baseline.     Psychiatric:         Mood and Affect: Mood normal.         Behavior: Behavior normal.         Thought Content: Thought content normal.         Judgment: Judgment normal.         "

## 2025-05-19 NOTE — ASSESSMENT & PLAN NOTE
Orders:    Albumin / creatinine urine ratio; Future    CBC and differential; Future    Comprehensive metabolic panel; Future

## 2025-05-19 NOTE — PATIENT INSTRUCTIONS
"Patient Education     High blood pressure in adults   The Basics   Written by the doctors and editors at [facility]   What is high blood pressure? -- High blood pressure is a condition that puts you at risk for heart attack, stroke, and kidney disease. It does not usually cause symptoms. But it can be serious.  When your doctor or nurse tells you your blood pressure, they say 2 numbers. For instance, your doctor or nurse might say that your blood pressure is \"130 over 80.\" The top number is the pressure inside your arteries when your heart is tanja. The bottom number is the pressure inside your arteries when your heart is relaxed.  \"Elevated blood pressure\" is a term doctors or nurses use as a warning. People with elevated blood pressure do not yet have high blood pressure. But their blood pressure is not as low as it should be for good health.  Many experts define high, elevated, and normal blood pressure as follows:   High - Top number of 130 or above and/or bottom number of 80 or above.   Elevated - Top number between 120 and 129 and bottom number of 79 or below.   Normal - Top number of 119 or below and bottom number of 79 or below.  This information is also in the table (table 1).  How can I lower my blood pressure? -- If your doctor or nurse prescribed blood pressure medicine, the most important thing you can do is to take it. If it causes side effects, do not just stop taking it. Instead, talk to your doctor or nurse about the problems it causes. They might be able to lower your dose or switch you to another medicine. If cost is a problem, mention that, too. They might be able to put you on a less expensive medicine. Taking your blood pressure medicine can keep you from having a heart attack or stroke, and it can save your life!  Can I do anything on my own? -- You have a lot of control over your blood pressure. To lower it:   Lose weight (if you are overweight).   Choose a diet low in fat and rich in " "fruits, vegetables, and low-fat dairy products.   Eat less salt.   Do something active for at least 30 minutes a day on most days of the week.   Drink less alcohol (if you drink more than 2 alcoholic drinks per day).  It's also a good idea to get a home blood pressure meter. People who check their own blood pressure at home do better at keeping it low and can sometimes even reduce the amount of medicine they take.  All topics are updated as new evidence becomes available and our peer review process is complete.  This topic retrieved from [employer] on: Feb 26, 2024.  Topic 99866 Version 23.0  Release: 32.2.4 - C32.56  © 2024 UpToDate, Inc. and/or its affiliates. All rights reserved.  table 1: Definition of normal and high blood pressure  Level  Top number  Bottom number    High 130 or above 80 or above   Elevated 120 to 129 79 or below   Normal 119 or below 79 or below   These definitions are from the American College of Cardiology/American Heart Association. Other expert groups might use slightly different definitions.  \"Elevated blood pressure\" is a term doctor or nurses use as a warning. It means you do not yet have high blood pressure, but your blood pressure is not as low as it should be for good health.  Graphic 53683 Version 6.0  Consumer Information Use and Disclaimer   Disclaimer: This generalized information is a limited summary of diagnosis, treatment, and/or medication information. It is not meant to be comprehensive and should be used as a tool to help the user understand and/or assess potential diagnostic and treatment options. It does NOT include all information about conditions, treatments, medications, side effects, or risks that may apply to a specific patient. It is not intended to be medical advice or a substitute for the medical advice, diagnosis, or treatment of a health care provider based on the health care provider's examination and assessment of a patient's specific and unique circumstances. " Patients must speak with a health care provider for complete information about their health, medical questions, and treatment options, including any risks or benefits regarding use of medications. This information does not endorse any treatments or medications as safe, effective, or approved for treating a specific patient. UpToDate, Inc. and its affiliates disclaim any warranty or liability relating to this information or the use thereof.The use of this information is governed by the Terms of Use, available at https://www.[employer].com/en/know/clinical-effectiveness-terms. 2024© UpToDate, Inc. and its affiliates and/or licensors. All rights reserved.  Copyright   © 2024 UpToDate, Inc. and/or its affiliates. All rights reserved.    Medicare Preventive Visit Patient Instructions  Thank you for completing your Welcome to Medicare Visit or Medicare Annual Wellness Visit today. Your next wellness visit will be due in one year (5/20/2026).  The screening/preventive services that you may require over the next 5-10 years are detailed below. Some tests may not apply to you based off risk factors and/or age. Screening tests ordered at today's visit but not completed yet may show as past due. Also, please note that scanned in results may not display below.  Preventive Screenings:  Service Recommendations Previous Testing/Comments   Colorectal Cancer Screening  * Colonoscopy    * Fecal Occult Blood Test (FOBT)/Fecal Immunochemical Test (FIT)  * Fecal DNA/Cologuard Test  * Flexible Sigmoidoscopy Age: 45-75 years old   Colonoscopy: every 10 years (may be performed more frequently if at higher risk)  OR  FOBT/FIT: every 1 year  OR  Cologuard: every 3 years  OR  Sigmoidoscopy: every 5 years  Screening may be recommended earlier than age 45 if at higher risk for colorectal cancer. Also, an individualized decision between you and your healthcare provider will decide whether screening between the ages of 76-85 would be  appropriate. Colonoscopy: Not on file  FOBT/FIT: Not on file  Cologuard: Not on file  Sigmoidoscopy: Not on file    Screening Not Indicated     Breast Cancer Screening Age: 40+ years old  Frequency: every 1-2 years  Not required if history of left and right mastectomy Mammogram: 05/03/2021    Screening Not Indicated   Cervical Cancer Screening Between the ages of 21-29, pap smear recommended once every 3 years.   Between the ages of 30-65, can perform pap smear with HPV co-testing every 5 years.   Recommendations may differ for women with a history of total hysterectomy, cervical cancer, or abnormal pap smears in past. Pap Smear: Not on file    Screening Not Indicated   Hepatitis C Screening Once for adults born between 1945 and 1965  More frequently in patients at high risk for Hepatitis C Hep C Antibody: 09/28/2021    Screening Current   Diabetes Screening 1-2 times per year if you're at risk for diabetes or have pre-diabetes Fasting glucose: 99 mg/dL (11/18/2024)  A1C: 5.8 % (11/18/2024)  Screening Current  Due for Blood Glucose   Cholesterol Screening Once every 5 years if you don't have a lipid disorder. May order more often based on risk factors. Lipid panel: 05/17/2024    History Lipid Disorder  Due for Lipid Panel     Other Preventive Screenings Covered by Medicare:  Abdominal Aortic Aneurysm (AAA) Screening: covered once if your at risk. You're considered to be at risk if you have a family history of AAA.  Lung Cancer Screening: covers low dose CT scan once per year if you meet all of the following conditions: (1) Age 55-77; (2) No signs or symptoms of lung cancer; (3) Current smoker or have quit smoking within the last 15 years; (4) You have a tobacco smoking history of at least 20 pack years (packs per day multiplied by number of years you smoked); (5) You get a written order from a healthcare provider.  Glaucoma Screening: covered annually if you're considered high risk: (1) You have diabetes OR (2)  Family history of glaucoma OR (3)  aged 50 and older OR (4)  American aged 65 and older  Osteoporosis Screening: covered every 2 years if you meet one of the following conditions: (1) You're estrogen deficient and at risk for osteoporosis based off medical history and other findings; (2) Have a vertebral abnormality; (3) On glucocorticoid therapy for more than 3 months; (4) Have primary hyperparathyroidism; (5) On osteoporosis medications and need to assess response to drug therapy.   Last bone density test (DXA Scan): 05/03/2021.  HIV Screening: covered annually if you're between the age of 15-65. Also covered annually if you are younger than 15 and older than 65 with risk factors for HIV infection. For pregnant patients, it is covered up to 3 times per pregnancy.    Immunizations:  Immunization Recommendations   Influenza Vaccine Annual influenza vaccination during flu season is recommended for all persons aged >= 6 months who do not have contraindications   Pneumococcal Vaccine   * Pneumococcal conjugate vaccine = PCV13 (Prevnar 13), PCV15 (Vaxneuvance), PCV20 (Prevnar 20)  * Pneumococcal polysaccharide vaccine = PPSV23 (Pneumovax) Adults 19-65 yo with certain risk factors or if 65+ yo  If never received any pneumonia vaccine: recommend Prevnar 20 (PCV20)  Give PCV20 if previously received 1 dose of PCV13 or PPSV23   Hepatitis B Vaccine 3 dose series if at intermediate or high risk (ex: diabetes, end stage renal disease, liver disease)   Respiratory syncytial virus (RSV) Vaccine - COVERED BY MEDICARE PART D  * RSVPreF3 (Arexvy) CDC recommends that adults 60 years of age and older may receive a single dose of RSV vaccine using shared clinical decision-making (SCDM)   Tetanus (Td) Vaccine - COST NOT COVERED BY MEDICARE PART B Following completion of primary series, a booster dose should be given every 10 years to maintain immunity against tetanus. Td may also be given as tetanus wound  prophylaxis.   Tdap Vaccine - COST NOT COVERED BY MEDICARE PART B Recommended at least once for all adults. For pregnant patients, recommended with each pregnancy.   Shingles Vaccine (Shingrix) - COST NOT COVERED BY MEDICARE PART B  2 shot series recommended in those 19 years and older who have or will have weakened immune systems or those 50 years and older     Health Maintenance Due:      Topic Date Due   • Hepatitis C Screening  Completed     Immunizations Due:      Topic Date Due   • COVID-19 Vaccine (5 - 2024-25 season) 09/01/2024     Advance Directives   What are advance directives?  Advance directives are legal documents that state your wishes and plans for medical care. These plans are made ahead of time in case you lose your ability to make decisions for yourself. Advance directives can apply to any medical decision, such as the treatments you want, and if you want to donate organs.   What are the types of advance directives?  There are many types of advance directives, and each state has rules about how to use them. You may choose a combination of any of the following:  Living will:  This is a written record of the treatment you want. You can also choose which treatments you do not want, which to limit, and which to stop at a certain time. This includes surgery, medicine, IV fluid, and tube feedings.   Durable power of  for healthcare (DPAHC):  This is a written record that states who you want to make healthcare choices for you when you are unable to make them for yourself. This person, called a proxy, is usually a family member or a friend. You may choose more than 1 proxy.  Do not resuscitate (DNR) order:  A DNR order is used in case your heart stops beating or you stop breathing. It is a request not to have certain forms of treatment, such as CPR. A DNR order may be included in other types of advance directives.  Medical directive:  This covers the care that you want if you are in a coma, near  death, or unable to make decisions for yourself. You can list the treatments you want for each condition. Treatment may include pain medicine, surgery, blood transfusions, dialysis, IV or tube feedings, and a ventilator (breathing machine).  Values history:  This document has questions about your views, beliefs, and how you feel and think about life. This information can help others choose the care that you would choose.  Why are advance directives important?  An advance directive helps you control your care. Although spoken wishes may be used, it is better to have your wishes written down. Spoken wishes can be misunderstood, or not followed. Treatments may be given even if you do not want them. An advance directive may make it easier for your family to make difficult choices about your care.   Urinary Incontinence   Urinary incontinence (UI)  is when you lose control of your bladder. UI develops because your bladder cannot store or empty urine properly. The 3 most common types of UI are stress incontinence, urge incontinence, or both.  Medicines:   May be given to help strengthen your bladder control. Report any side effects of medication to your healthcare provider.  Do pelvic muscle exercises often:  Your pelvic muscles help you stop urinating. Squeeze these muscles tight for 5 seconds, then relax for 5 seconds. Gradually work up to squeezing for 10 seconds. Do 3 sets of 15 repetitions a day, or as directed. This will help strengthen your pelvic muscles and improve bladder control.  Train your bladder:  Go to the bathroom at set times, such as every 2 hours, even if you do not feel the urge to go. You can also try to hold your urine when you feel the urge to go. For example, hold your urine for 5 minutes when you feel the urge to go. As that becomes easier, hold your urine for 10 minutes.   Self-care:   Keep a UI record.  Write down how often you leak urine and how much you leak. Make a note of what you were  doing when you leaked urine.  Drink liquids as directed. You may need to limit the amount of liquid you drink to help control your urine leakage. Do not drink any liquid right before you go to bed. Limit or do not have drinks that contain caffeine or alcohol.   Prevent constipation.  Eat a variety of high-fiber foods. Good examples are high-fiber cereals, beans, vegetables, and whole-grain breads. Walking is the best way to trigger your intestines to have a bowel movement.  Exercise regularly and maintain a healthy weight.  Weight loss and exercise will decrease pressure on your bladder and help you control your leakage.   Use a catheter as directed  to help empty your bladder. A catheter is a tiny, plastic tube that is put into your bladder to drain your urine.   Go to behavior therapy as directed.  Behavior therapy may be used to help you learn to control your urge to urinate.     © Copyright [employer] 2018 Information is for End User's use only and may not be sold, redistributed or otherwise used for commercial purposes. All illustrations and images included in CareNotes® are the copyrighted property of [employer].M., Inc. or [employer]

## 2025-05-20 ENCOUNTER — RESULTS FOLLOW-UP (OUTPATIENT)
Dept: INTERNAL MEDICINE CLINIC | Facility: CLINIC | Age: 85
End: 2025-05-20

## 2025-05-29 ENCOUNTER — HOSPITAL ENCOUNTER (OUTPATIENT)
Dept: BONE DENSITY | Facility: HOSPITAL | Age: 85
Discharge: HOME | End: 2025-05-29
Attending: INTERNAL MEDICINE
Payer: MEDICARE

## 2025-05-29 DIAGNOSIS — E28.39 MENOPAUSE OVARIAN FAILURE: ICD-10-CM

## 2025-05-29 DIAGNOSIS — M85.80 OSTEOPENIA, UNSPECIFIED LOCATION: ICD-10-CM

## 2025-05-29 PROCEDURE — 77080 DXA BONE DENSITY AXIAL: CPT

## 2025-06-03 NOTE — TELEPHONE ENCOUNTER
Patient called back with several questions about DXA scan, asking what her numbers were, Offered an appointment with provider and she declined at this time, states she will discuss with provider at next visit.  If able to call and notify patient of her number she would appreciate that.

## 2025-06-04 NOTE — TELEPHONE ENCOUNTER
----- Message from Mau Mcneil DO sent at 6/4/2025  5:56 AM EDT -----  Call pt and give her the dexa results. Numbers are in the osteopenia range and not osteoporosis.   ----- Message -----  From: Gordon Worrell MA  Sent: 6/3/2025   4:28 PM EDT  To: Mau Mcneil, DO    Can you advise regarding the dexa scan number?   ----- Message -----  From: Mariam Sinclair RN  Sent: 6/3/2025   4:25 PM EDT  To: [facility]    ----- Message from Mariam Sinclair RN sent at 6/3/2025  4:25 PM EDT -----

## 2025-06-12 ENCOUNTER — OFFICE VISIT (OUTPATIENT)
Dept: INTERNAL MEDICINE CLINIC | Facility: CLINIC | Age: 85
End: 2025-06-12
Payer: MEDICARE

## 2025-06-12 VITALS
DIASTOLIC BLOOD PRESSURE: 84 MMHG | TEMPERATURE: 98.6 F | WEIGHT: 117.6 LBS | OXYGEN SATURATION: 97 % | HEART RATE: 70 BPM | BODY MASS INDEX: 21.64 KG/M2 | HEIGHT: 62 IN | SYSTOLIC BLOOD PRESSURE: 146 MMHG
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
## 2025-06-12 DIAGNOSIS — S30.810A ABRASION OF BUTTOCK, INITIAL ENCOUNTER: ICD-10-CM

## 2025-06-12 DIAGNOSIS — Z23 ENCOUNTER FOR IMMUNIZATION: ICD-10-CM

## 2025-06-12 PROCEDURE — 90715 TDAP VACCINE 7 YRS/> IM: CPT

## 2025-06-12 PROCEDURE — 99213 OFFICE O/P EST LOW 20 MIN: CPT | Performed by: INTERNAL MEDICINE

## 2025-06-12 PROCEDURE — G2211 COMPLEX E/M VISIT ADD ON: HCPCS | Performed by: INTERNAL MEDICINE

## 2025-06-12 PROCEDURE — 90471 IMMUNIZATION ADMIN: CPT

## 2025-06-12 RX ORDER — DOXYCYCLINE HYCLATE 100 MG
100 TABLET ORAL 2 TIMES DAILY
Qty: 28 TABLET | Refills: 0 | Status: SHIPPED | OUTPATIENT
Start: 2025-06-12 | End: 2025-06-26

## 2025-06-12 NOTE — PROGRESS NOTES
Name: Blanca Shane      : 1940      MRN: [id number]  Encounter Provider: Mau Mcneil DO  Encounter Date: 2025   Encounter department: [facility]  :  Assessment & Plan  Tick bite, unspecified site, initial encounter    Orders:    doxycycline hyclate (VIBRA-TABS) 100 mg tablet; Take 1 tablet (100 mg total) by mouth 2 (two) times a day for 14 days    TDAP VACCINE GREATER THAN OR EQUAL TO 8YO IM    Encounter for immunization    Orders:    doxycycline hyclate (VIBRA-TABS) 100 mg tablet; Take 1 tablet (100 mg total) by mouth 2 (two) times a day for 14 days    TDAP VACCINE GREATER THAN OR EQUAL TO 8YO IM    Abrasion of buttock, initial encounter    Orders:    doxycycline hyclate (VIBRA-TABS) 100 mg tablet; Take 1 tablet (100 mg total) by mouth 2 (two) times a day for 14 days    TDAP VACCINE GREATER THAN OR EQUAL TO 8YO IM    A/P:  Stable and no s/s of local infection. Wound probed and several partial fragments of tick legs/feelers removed. Tolerated well. No bleeding. Wound simply dressed. Discussed local wound care. Since duration is unknown, will treat with doxy 100mg bid x 14. Update her Td. RTC as  scheduled.          History of Present Illness   WF presents c/o one day history of a tick bite on her buttock. Discovered accidentally yesterday. Uncertain of the duration of the bite. No fever or chills. No rashes. No joint pain. No CP, SOB, or palpitations. ?last Td. Partially removed by family.       Review of Systems   Constitutional:  Negative for activity change, chills, diaphoresis, fatigue and fever.   Respiratory:  Negative for cough, chest tightness, shortness of breath and wheezing.    Cardiovascular:  Negative for chest pain, palpitations and leg swelling.   Gastrointestinal:  Negative for abdominal pain, constipation, diarrhea, nausea and vomiting.   Genitourinary:  Negative for difficulty urinating, dysuria and frequency.   Musculoskeletal:  Negative for arthralgias,  "gait problem and myalgias.   Skin:  Positive for wound.   Neurological:  Negative for dizziness, seizures, syncope, weakness, light-headedness and headaches.   Psychiatric/Behavioral:  Negative for confusion, dysphoric mood and sleep disturbance. The patient is not nervous/anxious.        Objective   /84   Pulse 70   Temp 98.6 °F (37 °C)   Ht 5' 2\" (1.575 m)   Wt 53.3 kg (117 lb 9.6 oz)   SpO2 97%   BMI 21.51 kg/m²      Physical Exam  Vitals and nursing note reviewed.   Constitutional:       General: She is not in acute distress.     Appearance: Normal appearance. She is not ill-appearing.   HENT:      Head: Normocephalic and atraumatic.      Mouth/Throat:      Mouth: Mucous membranes are moist.     Eyes:      Extraocular Movements: Extraocular movements intact.      Conjunctiva/sclera: Conjunctivae normal.      Pupils: Pupils are equal, round, and reactive to light.       Cardiovascular:      Rate and Rhythm: Normal rate and regular rhythm.      Heart sounds: Normal heart sounds. No murmur heard.  Pulmonary:      Effort: Pulmonary effort is normal. No respiratory distress.      Breath sounds: Normal breath sounds. No wheezing, rhonchi or rales.     Skin:     Findings: Lesion (RIght lower buttock with a 2mm puncture site with suspected tick fragments. No surrounding erythema, d/c, induration or fluctuaton. Non tenderness.) present.     Neurological:      General: No focal deficit present.      Mental Status: She is alert and oriented to person, place, and time. Mental status is at baseline.     Psychiatric:         Mood and Affect: Mood normal.         Behavior: Behavior normal.         Thought Content: Thought content normal.         Judgment: Judgment normal.         "

## 2025-06-17 ENCOUNTER — TELEPHONE (OUTPATIENT)
Dept: INTERNAL MEDICINE CLINIC | Facility: CLINIC | Age: 85
End: 2025-06-17

## 2025-06-17 NOTE — TELEPHONE ENCOUNTER
Called patient and got an update on tick bite. Pt states that it is about the same but is having some itching now as well.